# Patient Record
Sex: MALE | Race: WHITE | Employment: OTHER | URBAN - METROPOLITAN AREA
[De-identification: names, ages, dates, MRNs, and addresses within clinical notes are randomized per-mention and may not be internally consistent; named-entity substitution may affect disease eponyms.]

---

## 2018-03-29 ENCOUNTER — OFFICE VISIT (OUTPATIENT)
Dept: CARDIOLOGY CLINIC | Age: 63
End: 2018-03-29
Payer: COMMERCIAL

## 2018-03-29 VITALS
DIASTOLIC BLOOD PRESSURE: 62 MMHG | RESPIRATION RATE: 14 BRPM | WEIGHT: 218 LBS | HEART RATE: 52 BPM | BODY MASS INDEX: 28.89 KG/M2 | HEIGHT: 73 IN | SYSTOLIC BLOOD PRESSURE: 132 MMHG

## 2018-03-29 DIAGNOSIS — I25.10 ATHEROSCLEROSIS OF NATIVE CORONARY ARTERY OF NATIVE HEART WITHOUT ANGINA PECTORIS: Primary | ICD-10-CM

## 2018-03-29 PROCEDURE — 99214 OFFICE O/P EST MOD 30 MIN: CPT | Performed by: INTERNAL MEDICINE

## 2018-03-29 PROCEDURE — 93000 ELECTROCARDIOGRAM COMPLETE: CPT | Performed by: INTERNAL MEDICINE

## 2018-03-29 RX ORDER — MESALAMINE 1000 MG/1
SUPPOSITORY RECTAL
Refills: 0 | COMMUNITY
Start: 2018-03-18 | End: 2021-10-05 | Stop reason: CLARIF

## 2018-03-29 NOTE — PROGRESS NOTES
Chief Complaint   Patient presents with    Coronary Artery Disease       Patient Active Problem List    Diagnosis Date Noted    Coronary atherosclerosis of native coronary artery 03/07/2012     Overview Note:       A. Cardiac catheterization, 4/15/05. Moderate coronary atherosclerosis in RCA, circumflex and LAD arteries. Normal LV systolic function. LVEF 65%. B. Exercise Myoview 4/21/08.  13 METS, no chest pain, normal ECG, normal scan.  Hypertension     Hyperlipidemia      Overview Note:       C. Possible LFT abnormality with Lipitor. Myalgias with crestor      Diabetes mellitus ([facility]        Current Outpatient Prescriptions   Medication Sig Dispense Refill    metoprolol succinate (TOPROL XL) 50 MG extended release tablet Take 50 mg by mouth daily       CIALIS 20 MG tablet Take 20 mg by mouth as needed       insulin aspart protamine-insulin aspart (NOVOLOG MIX 70/30 FLEXPEN) (70-30) 100 UNIT/ML injection Inject  into the skin as needed. Sliding scale as needed      potassium chloride (KLOR-CON) 20 MEQ packet Take 20 mEq by mouth 2 times daily. Takes 2 tabs am, 1 tab pm      MERCAPTOPURINE PO Take 125 mg by mouth. 2.5 tabs a day      amlodipine-benazepril (LOTREL) 10-20 MG per capsule Take 1 capsule by mouth daily.  terazosin (HYTRIN) 2 MG capsule Take 3 mg by mouth 2 times daily.  hydrochlorothiazide (HYDRODIURIL) 25 MG tablet Take 25 mg by mouth daily.  pravastatin (PRAVACHOL) 20 MG tablet Take 20 mg by mouth daily.  aspirin 81 MG EC tablet Take 81 mg by mouth daily.  balsalazide (COLAZAL) 750 MG capsule Take 750 mg by mouth 3 times daily.  Testosterone (ANDROGEL TD) Place  onto the skin daily. pump      Cholecalciferol (VITAMIN D PO) Take  by mouth.  insulin glargine (LANTUS) 100 UNIT/ML injection Inject 28 Units into the skin every morning.       CANASA 1000 MG suppository unwrap and insert 1 suppository rectally at bedtime  0     No current

## 2018-03-29 NOTE — PROGRESS NOTES
Subjective:      Patient ID: Nino Blanc is a 57 y.o. male.     HPI    Review of Systems    Objective:   Physical Exam    Assessment:        Plan:

## 2019-04-08 ENCOUNTER — OFFICE VISIT (OUTPATIENT)
Dept: CARDIOLOGY CLINIC | Age: 64
End: 2019-04-08
Payer: COMMERCIAL

## 2019-04-08 VITALS
RESPIRATION RATE: 16 BRPM | HEIGHT: 73 IN | WEIGHT: 211 LBS | HEART RATE: 67 BPM | BODY MASS INDEX: 27.96 KG/M2 | SYSTOLIC BLOOD PRESSURE: 124 MMHG | DIASTOLIC BLOOD PRESSURE: 70 MMHG

## 2019-04-08 DIAGNOSIS — I25.10 ATHEROSCLEROSIS OF NATIVE CORONARY ARTERY WITHOUT ANGINA PECTORIS, UNSPECIFIED WHETHER NATIVE OR TRANSPLANTED HEART: Primary | ICD-10-CM

## 2019-04-08 PROCEDURE — 93000 ELECTROCARDIOGRAM COMPLETE: CPT | Performed by: INTERNAL MEDICINE

## 2019-04-08 PROCEDURE — 99213 OFFICE O/P EST LOW 20 MIN: CPT | Performed by: INTERNAL MEDICINE

## 2019-04-08 NOTE — PROGRESS NOTES
Chief Complaint   Patient presents with    Coronary Artery Disease       Patient Active Problem List    Diagnosis Date Noted    Coronary atherosclerosis of native coronary artery 03/07/2012     Overview Note:       A. Cardiac catheterization, 4/15/05. Moderate coronary atherosclerosis in RCA, circumflex and LAD arteries. Normal LV systolic function. LVEF 65%. B. Exercise Myoview 4/21/08.  13 METS, no chest pain, normal ECG, normal scan.  Hypertension     Hyperlipidemia      Overview Note:       C. Possible LFT abnormality with Lipitor. Myalgias with crestor      Diabetes mellitus ([facility] [address]        Current Outpatient Medications   Medication Sig Dispense Refill    CANASA 1000 MG suppository unwrap and insert 1 suppository rectally at bedtime  0    metoprolol succinate (TOPROL XL) 50 MG extended release tablet Take 50 mg by mouth daily       CIALIS 20 MG tablet Take 20 mg by mouth as needed       insulin aspart protamine-insulin aspart (NOVOLOG MIX 70/30 FLEXPEN) (70-30) 100 UNIT/ML injection Inject  into the skin as needed. Sliding scale as needed      potassium chloride (KLOR-CON) 20 MEQ packet Take 20 mEq by mouth 2 times daily. Takes 2 tabs am, 1 tab pm      amlodipine-benazepril (LOTREL) 10-20 MG per capsule Take 1 capsule by mouth daily.  terazosin (HYTRIN) 2 MG capsule Take 3 mg by mouth 2 times daily Indications: Takes 7 mg in the am 5 mg the pm       hydrochlorothiazide (HYDRODIURIL) 25 MG tablet Take 25 mg by mouth daily.  pravastatin (PRAVACHOL) 20 MG tablet Take 20 mg by mouth daily.  aspirin 81 MG EC tablet Take 81 mg by mouth daily.  balsalazide (COLAZAL) 750 MG capsule Take 750 mg by mouth 3 times daily.  Testosterone (ANDROGEL TD) Place  onto the skin daily. pump      Cholecalciferol (VITAMIN D PO) Take  by mouth.         insulin glargine (LANTUS) 100 UNIT/ML injection Inject 32 Units into the skin every morning       MERCAPTOPURINE PO Take 125 mg

## 2020-07-08 ENCOUNTER — HOSPITAL ENCOUNTER (EMERGENCY)
Age: 65
Discharge: HOME | End: 2020-07-08
Attending: EMERGENCY MEDICINE
Payer: MEDICARE

## 2020-07-08 ENCOUNTER — APPOINTMENT (OUTPATIENT)
Dept: GENERAL RADIOLOGY | Age: 65
End: 2020-07-08
Payer: MEDICARE

## 2020-07-08 VITALS
DIASTOLIC BLOOD PRESSURE: 80 MMHG | RESPIRATION RATE: 16 BRPM | BODY MASS INDEX: 25.18 KG/M2 | WEIGHT: 190 LBS | HEIGHT: 73 IN | TEMPERATURE: 97.7 F | SYSTOLIC BLOOD PRESSURE: 147 MMHG | OXYGEN SATURATION: 99 % | HEART RATE: 82 BPM

## 2020-07-08 LAB
ANION GAP SERPL CALCULATED.3IONS-SCNC: 10 MMOL/L (ref 7–16)
ANISOCYTOSIS: ABNORMAL
APTT: 26.2 SEC (ref 24.5–35.1)
BASOPHILS ABSOLUTE: 0.01 E9/L (ref 0–0.2)
BASOPHILS RELATIVE PERCENT: 0.1 % (ref 0–2)
BUN BLDV-MCNC: 17 MG/DL (ref 8–23)
CALCIUM SERPL-MCNC: 9.3 MG/DL (ref 8.6–10.2)
CHLORIDE BLD-SCNC: 99 MMOL/L (ref 98–107)
CO2: 30 MMOL/L (ref 22–29)
CREAT SERPL-MCNC: 0.9 MG/DL (ref 0.7–1.2)
EKG ATRIAL RATE: 441 BPM
EKG Q-T INTERVAL: 380 MS
EKG QRS DURATION: 104 MS
EKG QTC CALCULATION (BAZETT): 430 MS
EKG R AXIS: 46 DEGREES
EKG T AXIS: 43 DEGREES
EKG VENTRICULAR RATE: 77 BPM
EOSINOPHILS ABSOLUTE: 0.01 E9/L (ref 0.05–0.5)
EOSINOPHILS RELATIVE PERCENT: 0.1 % (ref 0–6)
GFR AFRICAN AMERICAN: >60
GFR NON-AFRICAN AMERICAN: >60 ML/MIN/1.73
GLUCOSE BLD-MCNC: 159 MG/DL (ref 74–99)
HCT VFR BLD CALC: 36.6 % (ref 37–54)
HEMOGLOBIN: 11.9 G/DL (ref 12.5–16.5)
IMMATURE GRANULOCYTES #: 0.06 E9/L
IMMATURE GRANULOCYTES %: 0.9 % (ref 0–5)
INR BLD: 1
LYMPHOCYTES ABSOLUTE: 0.4 E9/L (ref 1.5–4)
LYMPHOCYTES RELATIVE PERCENT: 5.9 % (ref 20–42)
MCH RBC QN AUTO: 29.1 PG (ref 26–35)
MCHC RBC AUTO-ENTMCNC: 32.5 % (ref 32–34.5)
MCV RBC AUTO: 89.5 FL (ref 80–99.9)
MONOCYTES ABSOLUTE: 0.23 E9/L (ref 0.1–0.95)
MONOCYTES RELATIVE PERCENT: 3.4 % (ref 2–12)
NEUTROPHILS ABSOLUTE: 6.08 E9/L (ref 1.8–7.3)
NEUTROPHILS RELATIVE PERCENT: 89.6 % (ref 43–80)
OVALOCYTES: ABNORMAL
PDW BLD-RTO: 13.5 FL (ref 11.5–15)
PLATELET # BLD: 107 E9/L (ref 130–450)
PMV BLD AUTO: 8.9 FL (ref 7–12)
POIKILOCYTES: ABNORMAL
POTASSIUM REFLEX MAGNESIUM: 3.8 MMOL/L (ref 3.5–5)
PRO-BNP: 926 PG/ML (ref 0–125)
PROTHROMBIN TIME: 12 SEC (ref 9.3–12.4)
RBC # BLD: 4.09 E12/L (ref 3.8–5.8)
SCHISTOCYTES: ABNORMAL
SODIUM BLD-SCNC: 139 MMOL/L (ref 132–146)
TROPONIN: <0.01 NG/ML (ref 0–0.03)
WBC # BLD: 6.8 E9/L (ref 4.5–11.5)

## 2020-07-08 PROCEDURE — 84484 ASSAY OF TROPONIN QUANT: CPT

## 2020-07-08 PROCEDURE — 80048 BASIC METABOLIC PNL TOTAL CA: CPT

## 2020-07-08 PROCEDURE — 71046 X-RAY EXAM CHEST 2 VIEWS: CPT

## 2020-07-08 PROCEDURE — 85610 PROTHROMBIN TIME: CPT

## 2020-07-08 PROCEDURE — 85730 THROMBOPLASTIN TIME PARTIAL: CPT

## 2020-07-08 PROCEDURE — 93010 ELECTROCARDIOGRAM REPORT: CPT | Performed by: INTERNAL MEDICINE

## 2020-07-08 PROCEDURE — 99284 EMERGENCY DEPT VISIT MOD MDM: CPT

## 2020-07-08 PROCEDURE — 36415 COLL VENOUS BLD VENIPUNCTURE: CPT

## 2020-07-08 PROCEDURE — 85025 COMPLETE CBC W/AUTO DIFF WBC: CPT

## 2020-07-08 PROCEDURE — 83880 ASSAY OF NATRIURETIC PEPTIDE: CPT

## 2020-07-08 PROCEDURE — 93005 ELECTROCARDIOGRAM TRACING: CPT | Performed by: EMERGENCY MEDICINE

## 2020-07-08 ASSESSMENT — ENCOUNTER SYMPTOMS
ABDOMINAL PAIN: 0
SORE THROAT: 0
RHINORRHEA: 0
SHORTNESS OF BREATH: 0
COUGH: 0
BACK PAIN: 0
VOMITING: 0
DIARRHEA: 1
WHEEZING: 0
EYE DISCHARGE: 0
EYE PAIN: 0
NAUSEA: 0
CHEST TIGHTNESS: 0
EYE REDNESS: 0

## 2020-07-08 NOTE — ED PROVIDER NOTES
Musculoskeletal: Normal range of motion and neck supple. Cardiovascular:      Rate and Rhythm: Normal rate. Rhythm irregular. Heart sounds: Normal heart sounds. No murmur. Pulmonary:      Effort: Pulmonary effort is normal. No respiratory distress. Breath sounds: Normal breath sounds. No wheezing or rales. Abdominal:      General: Bowel sounds are normal.      Palpations: Abdomen is soft. Tenderness: There is no abdominal tenderness. There is no guarding or rebound. Musculoskeletal:         General: No swelling, tenderness or deformity. Skin:     General: Skin is warm and dry. Neurological:      Mental Status: He is alert and oriented to person, place, and time. Cranial Nerves: No cranial nerve deficit. Coordination: Coordination normal.        EKG Interpretation    Interpreted by emergency department physician    Rhythm: atrial fibrillation - controlled  Rate: normal, 77  Axis: normal  Ectopy: none  ST Segments: no acute change  T Waves: no acute change  Q Waves: none    Clinical Impression: atrial fibrillation (new onset)    [place]    Procedures     MDM  Number of Diagnoses or Management Options  Diagnosis management comments: 22-year-old male presents to the ED from his PCP office due to incidental finding of new onset atrial fibrillation, asymptomatic. Patient is currently on metoprolol for his blood pressure, rate controlled. He is not on any OAC's. No significant cardiac history, stress test in 2008 was normal.  Currently being treated for ulcerative colitis, is on prednisone and Humira; reports recent weight loss of 30 pounds secondary to this diagnosis. Initial EKG shows atrial fibrillation with normal rate. Differential diagnoses include but are not limited to new onset arrhythmia 2/2 recent medication additions, blood loss anemia, ACS, CHF, hypertension.   Orders include troponin, BNP, BMP, CBC, CXR.       1627 spoke with Dr. Reema Tipton covering for Dr. Jarrell Mcpherson cardiology group, recommends to avoid OAC's at this time due to patient's recent GI bleed within the last week, acknowledges that patient is currently rate controlled with metoprolol, requests patient follow-up with Dr. Pedro Luis Nieto within 1 week. Okay to discharge home from cardiology standpoint. 1700 spoke with Dr. Saritha Conteh who agreed with cardiology that OACs will be held for now in light of patient's recent UC GI bleed history. Requests that patient call on Friday to make an appointment to see him next Tuesday for follow-up. Okay to discharge home from PCP standpoint. ED Course as of Jul 08 1824 Wed Jul 08, 2020   1721 Pro-BNP(!): 926 [VG]   1722 Pro-BNP(!): 926 [VG]   1723 Troponin: <0.01 [VG]      ED Course User Index  [VG] Bonita Ramos DO        ED Course as of Jul 08 1824 Wed Jul 08, 2020   1721 Pro-BNP(!): 926 [VG]   1722 Pro-BNP(!): 926 [VG]   1723 Troponin: <0.01 [VG]      ED Course User Index  [VG] Bonita Ramos DO       --------------------------------------------- PAST HISTORY ---------------------------------------------  Past Medical History:  has a past medical history of Coronary atherosclerosis of native coronary artery, Diabetes mellitus ([facility] [address] Hyperlipidemia, Hypertension, and [facility] Past Surgical History:  has a past surgical history that includes Diagnostic Cardiac Cath Lab Procedure and sigmoidoscopy. Social History:  reports that he quit smoking about 38 years ago. He has never used smokeless tobacco. He reports that he does not drink alcohol or use drugs. Family History: family history includes Cancer in his mother. The patients home medications have been reviewed.     Allergies: Sulfa antibiotics    -------------------------------------------------- RESULTS -------------------------------------------------  Labs:  Results for orders placed or performed during the hospital encounter of 07/08/20   CBC Auto Differential   Result Value Ref ------------------------- NURSING NOTES AND VITALS REVIEWED ---------------------------  Date / Time Roomed:  7/8/2020  3:24 PM  ED Bed Assignment:  19/19    The nursing notes within the ED encounter and vital signs as below have been reviewed. BP (!) 154/93   Pulse 86   Temp 97.7 °F (36.5 °C) (Temporal)   Resp 16   Ht 6' 1\" (1.854 m)   Wt 190 lb (86.2 kg)   SpO2 99%   BMI 25.07 kg/m²   Oxygen Saturation Interpretation: Normal      ------------------------------------------ PROGRESS NOTES ------------------------------------------  6:01 PM EDT  I have spoken with the patient and discussed todays results, in addition to providing specific details for the plan of care and counseling regarding the diagnosis and prognosis. Their questions are answered at this time and they are agreeable with the plan. I discussed at length with them reasons for immediate return here for re evaluation. They will followup with their cardiologist and primary care physician by calling their offices tomorrow and Friday for follow-up appointments within the next week.      --------------------------------- ADDITIONAL PROVIDER NOTES ---------------------------------  At this time the patient is without objective evidence of an acute process requiring hospitalization or inpatient management. They have remained hemodynamically stable throughout their entire ED visit and are stable for discharge with outpatient follow-up. The plan has been discussed in detail and they are aware of the specific conditions for emergent return, as well as the importance of follow-up. New Prescriptions    No medications on file       Diagnosis:  1. Atrial fibrillation, unspecified type ([facility] New Problem        Disposition:  Patient's disposition: Discharge to home  Patient's condition is stable.          Genoveva Hill DO  Resident  07/08/20 1310 [facility] Herlinda Fisher DO  Resident  07/08/20 8388

## 2020-07-09 ENCOUNTER — TELEPHONE (OUTPATIENT)
Dept: ADMINISTRATIVE | Age: 65
End: 2020-07-09

## 2020-07-09 NOTE — TELEPHONE ENCOUNTER
Patient called stated he was in the ER yesterday 7/8/20 for AFib and was told to f/u with Dr. Halley Marinelli next week, he can be reached at [phone].

## 2020-07-13 ENCOUNTER — TELEPHONE (OUTPATIENT)
Dept: CARDIOLOGY CLINIC | Age: 65
End: 2020-07-13

## 2020-07-16 ENCOUNTER — OFFICE VISIT (OUTPATIENT)
Dept: CARDIOLOGY CLINIC | Age: 65
End: 2020-07-16
Payer: MEDICARE

## 2020-07-16 VITALS
RESPIRATION RATE: 16 BRPM | HEIGHT: 73 IN | DIASTOLIC BLOOD PRESSURE: 74 MMHG | WEIGHT: 190 LBS | BODY MASS INDEX: 25.18 KG/M2 | OXYGEN SATURATION: 99 % | SYSTOLIC BLOOD PRESSURE: 126 MMHG | TEMPERATURE: 97.5 F | HEART RATE: 87 BPM

## 2020-07-16 PROBLEM — I48.19 OTHER PERSISTENT ATRIAL FIBRILLATION (HCC): Status: ACTIVE | Noted: 2020-07-16

## 2020-07-16 PROCEDURE — 93000 ELECTROCARDIOGRAM COMPLETE: CPT | Performed by: INTERNAL MEDICINE

## 2020-07-16 PROCEDURE — 99215 OFFICE O/P EST HI 40 MIN: CPT | Performed by: INTERNAL MEDICINE

## 2020-07-16 RX ORDER — PREDNISONE 10 MG/1
35 TABLET ORAL SEE ADMIN INSTRUCTIONS
Status: ON HOLD | COMMUNITY
End: 2021-02-15 | Stop reason: HOSPADM

## 2020-07-16 RX ORDER — ADALIMUMAB 40MG/0.4ML
40 KIT SUBCUTANEOUS
Status: ON HOLD | COMMUNITY
End: 2021-02-07

## 2020-07-16 RX ORDER — DICYCLOMINE HYDROCHLORIDE 10 MG/1
10 CAPSULE ORAL
COMMUNITY
End: 2022-05-19 | Stop reason: ALTCHOICE

## 2020-07-16 NOTE — PROGRESS NOTES
Chief Complaint   Patient presents with    Coronary Artery Disease    Hyperlipidemia    Hypertension       Patient Active Problem List    Diagnosis Date Noted    Other persistent atrial fibrillation 07/16/2020     Overview Note:     A. CHADS-VASC=4      Coronary atherosclerosis of native coronary artery 03/07/2012     Overview Note:       A. Cardiac catheterization, 4/15/05. Moderate coronary atherosclerosis in RCA, circumflex and LAD arteries. Normal LV systolic function. LVEF 65%. B. Exercise Myoview 4/21/08.  13 METS, no chest pain, normal ECG, normal scan.  Hypertension     Hyperlipidemia      Overview Note:       C. Possible LFT abnormality with Lipitor. Myalgias with crestor      Diabetes mellitus ([facility] [address]        Current Outpatient Medications   Medication Sig Dispense Refill    ZINC OXIDE, TOPICAL, 10 % CREA Apply topically 4 times daily as needed      Adalimumab (HUMIRA) 40 MG/0.4ML PSKT Inject 40 mg into the skin Every two weeks      predniSONE (DELTASONE) 5 MG tablet Take 5 mg by mouth daily      dicyclomine (BENTYL) 10 MG capsule Take 10 mg by mouth 4 times daily (before meals and nightly)      CANASA 1000 MG suppository unwrap and insert 1 suppository rectally at bedtime  0    metoprolol succinate (TOPROL XL) 50 MG extended release tablet Take 50 mg by mouth daily       CIALIS 20 MG tablet Take 20 mg by mouth as needed       insulin aspart protamine-insulin aspart (NOVOLOG MIX 70/30 FLEXPEN) (70-30) 100 UNIT/ML injection Inject  into the skin as needed. Sliding scale as needed      potassium chloride (KLOR-CON) 20 MEQ packet Take 20 mEq by mouth 2 times daily. Takes 2 tabs am, 1 tab pm      amlodipine-benazepril (LOTREL) 10-20 MG per capsule Take 1 capsule by mouth daily.  terazosin (HYTRIN) 2 MG capsule Take 3 mg by mouth 2 times daily Indications: Takes 7 mg in the am 5 mg the pm       hydrochlorothiazide (HYDRODIURIL) 25 MG tablet Take 25 mg by mouth daily.         pravastatin (PRAVACHOL) 20 MG tablet Take 20 mg by mouth daily.  aspirin 81 MG EC tablet Take 81 mg by mouth daily.  balsalazide (COLAZAL) 750 MG capsule Take 750 mg by mouth 3 times daily.  Testosterone (ANDROGEL TD) Place  onto the skin daily. pump      Cholecalciferol (VITAMIN D PO) Take  by mouth.  insulin glargine (LANTUS) 100 UNIT/ML injection Inject 32 Units into the skin every morning       MERCAPTOPURINE PO Take 125 mg by mouth.  2.5 tabs a day       Current Facility-Administered Medications   Medication Dose Route Frequency Provider Last Rate Last Dose    perflutren lipid microspheres (DEFINITY) injection 1.65 mg  1.5 mL Intravenous ONCE PRN Kelly Rg MD            Allergies   Allergen Reactions    Sulfa Antibiotics        Vitals:    20 0915   BP: 126/74   Pulse: 87   Resp: 16   Temp: 97.5 °F (36.4 °C)   SpO2: 99%   Weight: 190 lb (86.2 kg)   Height: 6' 1\" (1.854 m)       Social History     Socioeconomic History    Marital status:      Spouse name: Not on file    Number of children: Not on file    Years of education: Not on file    Highest education level: Not on file   Occupational History    Not on file   Social Needs    Financial resource strain: Not on file    Food insecurity     Worry: Not on file     Inability: Not on file    Transportation needs     Medical: Not on file     Non-medical: Not on file   Tobacco Use    Smoking status: Former Smoker     Last attempt to quit: 1982     Years since quittin.5    Smokeless tobacco: Never Used   Substance and Sexual Activity    Alcohol use: No    Drug use: No    Sexual activity: Not on file   Lifestyle    Physical activity     Days per week: Not on file     Minutes per session: Not on file    Stress: Not on file   Relationships    Social connections     Talks on phone: Not on file     Gets together: Not on file     Attends Scientologist service: Not on file     Active member of club or organization: Not on file     Attends meetings of clubs or organizations: Not on file     Relationship status: Not on file    Intimate partner violence     Fear of current or ex partner: Not on file     Emotionally abused: Not on file     Physically abused: Not on file     Forced sexual activity: Not on file   Other Topics Concern    Not on file   Social History Narrative    Not on file       Family History   Problem Relation Age of Onset    Cancer Mother          SUBJECTIVE: Albino Rousseau presents to the office today for re-evaluation of cardiac diagnoses and evaluation post ED visit of 7/8. Markie Fang He complains of no cardiac issues but was found to be in afib by PCP last week and send to ER.  ekg there identified atrial fibrillation. Sent home as no chf - [address] not started due to recent GIBs from [facility]. Markie Fang He  denies   chest pain, chest pressure/discomfort, claudication, dyspnea, exertional chest pressure/discomfort, fatigue, irregular heart beat, lower extremity edema, near-syncope, orthopnea, palpitations, paroxysmal nocturnal dyspnea, syncope and tachypnea. Sees [facility] for UC and dr George Sabillon for renal issues/htn        Review of Systems:   Heart: as above   Lungs: as above   Eyes: denies changes in vision or discharge. Ears: denies changes in hearing or pain. Nose: denies epistaxis or masses   Throat: denies sore throat or trouble swallowing. Neuro: denies numbness, tingling, tremors. Skin: denies rashes or itching. : denies hematuria, dysuria   GI: denies vomiting, diarrhea   Psych: denies mood changed, anxiety, depression. all others negative. Physical Exam   /74   Pulse 87   Temp 97.5 °F (36.4 °C)   Resp 16   Ht 6' 1\" (1.854 m)   Wt 190 lb (86.2 kg)   SpO2 99%   BMI 25.07 kg/m²   Constitutional: Oriented to person, place, and time. Well-developed and well-nourished. No distress. Head: Normocephalic and atraumatic. Eyes: EOM are normal. Pupils are equal, round, and reactive to light.

## 2020-08-14 ENCOUNTER — TELEPHONE (OUTPATIENT)
Dept: CARDIOLOGY | Age: 65
End: 2020-08-14

## 2020-08-17 ENCOUNTER — HOSPITAL ENCOUNTER (OUTPATIENT)
Dept: CARDIOLOGY | Age: 65
Discharge: HOME | End: 2020-08-17
Payer: MEDICARE

## 2020-08-17 LAB
LV EF: 55 %
LVEF MODALITY: NORMAL

## 2020-08-17 PROCEDURE — 93306 TTE W/DOPPLER COMPLETE: CPT

## 2020-08-18 ENCOUNTER — TELEPHONE (OUTPATIENT)
Dept: CARDIOLOGY CLINIC | Age: 65
End: 2020-08-18

## 2020-08-18 NOTE — TELEPHONE ENCOUNTER
----- Message from Wendie Mcnally MD sent at 8/18/2020  6:35 AM EDT -----  Echo no issues  Ov two months

## 2020-08-18 NOTE — TELEPHONE ENCOUNTER
Patient notified  and instructed on echo results and follow-up per Dr. Pedro Luis Nieto. He stated understanding.

## 2020-10-01 ENCOUNTER — OFFICE VISIT (OUTPATIENT)
Dept: CARDIOLOGY CLINIC | Age: 65
End: 2020-10-01
Payer: MEDICARE

## 2020-10-01 VITALS
RESPIRATION RATE: 18 BRPM | HEART RATE: 92 BPM | WEIGHT: 187.6 LBS | BODY MASS INDEX: 24.86 KG/M2 | DIASTOLIC BLOOD PRESSURE: 70 MMHG | HEIGHT: 73 IN | SYSTOLIC BLOOD PRESSURE: 118 MMHG

## 2020-10-01 PROBLEM — Z92.89 HISTORY OF ECHOCARDIOGRAM: Status: ACTIVE | Noted: 2020-10-01

## 2020-10-01 PROCEDURE — 99213 OFFICE O/P EST LOW 20 MIN: CPT | Performed by: INTERNAL MEDICINE

## 2020-10-01 PROCEDURE — 93000 ELECTROCARDIOGRAM COMPLETE: CPT | Performed by: INTERNAL MEDICINE

## 2020-10-01 RX ORDER — VIT C/B6/B5/MAGNESIUM/HERB 173 50-5-6-5MG
500 CAPSULE ORAL 2 TIMES DAILY
COMMUNITY

## 2020-10-01 RX ORDER — FERROUS SULFATE 325(65) MG
325 TABLET ORAL
COMMUNITY
End: 2021-02-11

## 2020-10-01 NOTE — PROGRESS NOTES
Chief Complaint   Patient presents with    Atrial Fibrillation    Hypertension       Patient Active Problem List    Diagnosis Date Noted    History of echocardiogram 10/01/2020     Overview Note:     A. Echo 8/17/2020: EF 55%, mild LVH, mild MR      Other persistent atrial fibrillation ([facility] [address] 07/16/2020     Overview Note:     A. CHADS-VASC=4      Coronary atherosclerosis of native coronary artery 03/07/2012     Overview Note:       A. Cardiac catheterization, 4/15/05. Moderate coronary atherosclerosis in RCA, circumflex and LAD arteries. Normal LV systolic function. LVEF 65%. B. Exercise Myoview 4/21/08.  13 METS, no chest pain, normal ECG, normal scan.  Hypertension     Hyperlipidemia      Overview Note:       C. Possible LFT abnormality with Lipitor. Myalgias with crestor      Diabetes mellitus ([facility] [address]        Current Outpatient Medications   Medication Sig Dispense Refill    ferrous sulfate (IRON 325) 325 (65 Fe) MG tablet Take 325 mg by mouth daily (with breakfast)      Turmeric 500 MG CAPS Take 500 mg by mouth daily      Adalimumab (HUMIRA) 40 MG/0.4ML PSKT Inject 40 mg into the skin Every two weeks      predniSONE (DELTASONE) 10 MG tablet Take 10 mg by mouth daily       dicyclomine (BENTYL) 10 MG capsule Take 10 mg by mouth 4 times daily (before meals and nightly)      CANASA 1000 MG suppository unwrap and insert 1 suppository rectally at bedtime  0    metoprolol succinate (TOPROL XL) 50 MG extended release tablet Take 50 mg by mouth daily       CIALIS 20 MG tablet Take 20 mg by mouth as needed       insulin aspart protamine-insulin aspart (NOVOLOG MIX 70/30 FLEXPEN) (70-30) 100 UNIT/ML injection Inject  into the skin as needed. Sliding scale as needed      potassium chloride (KLOR-CON) 20 MEQ packet Take 20 mEq by mouth 2 times daily. Takes 2 tabs am, 1 tab pm      MERCAPTOPURINE PO Take 125 mg by mouth.  2.5 tabs a day      amlodipine-benazepril (LOTREL) 10-20 MG per capsule Take 1 capsule by mouth daily.  terazosin (HYTRIN) 2 MG capsule Take 3 mg by mouth 2 times daily Indications: Takes 7 mg in the am 5 mg the pm       hydrochlorothiazide (HYDRODIURIL) 25 MG tablet Take 25 mg by mouth daily.  pravastatin (PRAVACHOL) 20 MG tablet Take 20 mg by mouth daily.  aspirin 81 MG EC tablet Take 81 mg by mouth daily.  balsalazide (COLAZAL) 750 MG capsule Take 750 mg by mouth 3 times daily.  Testosterone (ANDROGEL TD) Place  onto the skin daily.  pump      Cholecalciferol (VITAMIN D PO) Take 2,000 Units by mouth 2 times daily       insulin glargine (LANTUS) 100 UNIT/ML injection Inject 28 Units into the skin every morning       ZINC OXIDE, TOPICAL, 10 % CREA Apply topically 4 times daily as needed       Current Facility-Administered Medications   Medication Dose Route Frequency Provider Last Rate Last Dose    perflutren lipid microspheres (DEFINITY) injection 1.65 mg  1.5 mL Intravenous ONCE PRN Anh Nesbitt MD            Allergies   Allergen Reactions    Sulfa Antibiotics        Vitals:    10/01/20 1029   BP: 118/70   Pulse: 92   Resp: 18   Weight: 187 lb 9.6 oz (85.1 kg)   Height: 6' 1\" (1.854 m)       Social History     Socioeconomic History    Marital status:      Spouse name: Not on file    Number of children: Not on file    Years of education: Not on file    Highest education level: Not on file   Occupational History    Not on file   Social Needs    Financial resource strain: Not on file    Food insecurity     Worry: Not on file     Inability: Not on file    Transportation needs     Medical: Not on file     Non-medical: Not on file   Tobacco Use    Smoking status: Former Smoker     Last attempt to quit: 1982     Years since quittin.7    Smokeless tobacco: Never Used   Substance and Sexual Activity    Alcohol use: No    Drug use: No    Sexual activity: Not on file   Lifestyle    Physical activity     Days per week: Not on file

## 2021-02-05 ENCOUNTER — HOSPITAL ENCOUNTER (EMERGENCY)
Age: 66
Discharge: HOME | End: 2021-02-05
Payer: MEDICARE

## 2021-02-05 ENCOUNTER — APPOINTMENT (OUTPATIENT)
Dept: GENERAL RADIOLOGY | Age: 66
End: 2021-02-05
Payer: MEDICARE

## 2021-02-05 VITALS
RESPIRATION RATE: 14 BRPM | WEIGHT: 180 LBS | HEART RATE: 85 BPM | BODY MASS INDEX: 23.86 KG/M2 | HEIGHT: 73 IN | DIASTOLIC BLOOD PRESSURE: 73 MMHG | TEMPERATURE: 98.4 F | SYSTOLIC BLOOD PRESSURE: 123 MMHG | OXYGEN SATURATION: 100 %

## 2021-02-05 DIAGNOSIS — R05.9 COUGH: Primary | ICD-10-CM

## 2021-02-05 PROCEDURE — 99283 EMERGENCY DEPT VISIT LOW MDM: CPT

## 2021-02-05 PROCEDURE — 71046 X-RAY EXAM CHEST 2 VIEWS: CPT

## 2021-02-05 PROCEDURE — U0003 INFECTIOUS AGENT DETECTION BY NUCLEIC ACID (DNA OR RNA); SEVERE ACUTE RESPIRATORY SYNDROME CORONAVIRUS 2 (SARS-COV-2) (CORONAVIRUS DISEASE [COVID-19]), AMPLIFIED PROBE TECHNIQUE, MAKING USE OF HIGH THROUGHPUT TECHNOLOGIES AS DESCRIBED BY CMS-2020-01-R: HCPCS

## 2021-02-05 NOTE — ED PROVIDER NOTES
[facility]  Department of Emergency Medicine   ED  Encounter Note  Admit Date/RoomTime: 2021 11:54 AM  ED Room: 30-A/30-A    NAME: Baljit Haider  : 1955  MRN: [id number]     Chief Complaint:  Other (chest congestion x 2-3 days mostly at night, denies cough)    HISTORY OF PRESENT ILLNESS        Baljit Haider is a 77 y.o. male who presents to the ED by private vehicle for productive cough x3 days. Patient states it is worse at night. Patient states he is also had a low-grade fever of 99.5. Patient states he called his PCP who sent him here to rule out Covid. Patient states his symptoms are mild in severity. Patient denies any pain. Patient denies anything making it better or worse. Patient denies history of asthma or COPD. Patient denies known Covid exposure. Denies headache, vision change, dizziness, hemoptysis, chest pain, dyspnea, abdominal pain, NVD, numbness/weakness. ROS   Pertinent positives and negatives are stated within HPI, all other systems reviewed and are negative. Past Medical History:  has a past medical history of Coronary atherosclerosis of native coronary artery, Diabetes mellitus ([facility] [address] Hyperlipidemia, Hypertension, and Ulcerative colitis ([facility] [address] Surgical History:  has a past surgical history that includes Diagnostic Cardiac Cath Lab Procedure and sigmoidoscopy. Social History:  reports that he quit smoking about 39 years ago. He has never used smokeless tobacco. He reports that he does not drink alcohol or use drugs. Family History: family history includes Cancer in his mother.      Allergies: Sulfa antibiotics    PHYSICAL EXAM   Oxygen Saturation Interpretation: Normal.        ED Triage Vitals [21 1152]   BP Temp Temp src Pulse Resp SpO2 Height Weight   123/73 98.4 °F (36.9 °C) -- 85 14 100 % 6' 1\" (1.854 m) 180 lb (81.6 kg)         Physical Exam  Constitutional/General: Alert and oriented x3, well appearing, non toxic.  HEENT:  NC/NT. PERRLA,  Airway patent. Neck: Supple, full ROM, non tender to palpation in the midline, no stridor, no crepitus, no meningeal signs  Respiratory: Lungs clear to auscultation bilaterally, no wheezes, rales, or rhonchi. Not in respiratory distress  CV:  Regular rate. Regular rhythm. No murmurs, gallops, or rubs. 2+ distal pulses  Chest: No chest wall tenderness  GI:  Abdomen Soft, Non tender, Non distended. +BS. No rebound, guarding, or rigidity. No pulsatile masses. Musculoskeletal: Moves all extremities x 4. Warm and well perfused, no clubbing, cyanosis, or edema. Capillary refill <3 seconds  Integument: skin warm and dry. No rashes. Lymphatic: no lymphadenopathy noted  Neurologic: GCS 15, no focal deficits, symmetric strength 5/5 in the upper and lower extremities bilaterally  Psychiatric: Normal Affect      Lab / Imaging Results   (All laboratory and radiology results have been personally reviewed by myself)  Labs:  No results found for this visit on 02/05/21. Imaging: All Radiology results interpreted by Radiologist unless otherwise noted. XR CHEST (2 VW)   Final Result   No acute cardiopulmonary process. ED Course / Medical Decision Making   Medications - No data to display       Consultations:             None    Procedures:   none    MDM: Patient presenting with productive cough. Patient is in no acute distress, afebrile, nontoxic in appearance. Patient's x-ray is negative. Patient will receive a send out Covid. Patient's vitals are all stable. Recommended patient follow-up with PCP. Recommended patient return to the ED with new or worsening of symptoms. Plan of Care/Counseling:  I reviewed today's visit with the patient in addition to providing specific details for the plan of care and counseling regarding the diagnosis and prognosis. Questions are answered at this time and are agreeable with the plan. ASSESSMENT     1.  Cough      This patient's ED course included: a personal history and physicial examination and multiple bedside re-evaluations  This patient has remained hemodynamically stable during their ED course. PLAN   Discharge home. Patient condition is stable. New Medications     Discharge Medication List as of 2/5/2021 12:54 PM        Electronically signed by Rosalina Miller PA-C   DD: 2/5/21  **This report was transcribed using voice recognition software. Every effort was made to ensure accuracy; however, inadvertent computerized transcription errors may be present.   Keisha Schwarz PA-C  02/05/21 0688

## 2021-02-06 ENCOUNTER — APPOINTMENT (OUTPATIENT)
Dept: CT IMAGING | Age: 66
End: 2021-02-06
Payer: MEDICARE

## 2021-02-06 ENCOUNTER — CARE COORDINATION (OUTPATIENT)
Dept: CARE COORDINATION | Age: 66
End: 2021-02-06

## 2021-02-06 ENCOUNTER — HOSPITAL ENCOUNTER (OUTPATIENT)
Age: 66
Setting detail: OBSERVATION
Discharge: HOME | End: 2021-02-08
Attending: EMERGENCY MEDICINE | Admitting: INTERNAL MEDICINE
Payer: MEDICARE

## 2021-02-06 ENCOUNTER — APPOINTMENT (OUTPATIENT)
Dept: GENERAL RADIOLOGY | Age: 66
End: 2021-02-06
Payer: MEDICARE

## 2021-02-06 DIAGNOSIS — Z79.52 IMMUNOCOMPROMISED DUE TO CORTICOSTEROIDS (HCC): ICD-10-CM

## 2021-02-06 DIAGNOSIS — U07.1 COVID-19: Primary | ICD-10-CM

## 2021-02-06 DIAGNOSIS — E86.0 DEHYDRATION: ICD-10-CM

## 2021-02-06 DIAGNOSIS — R53.1 GENERAL WEAKNESS: ICD-10-CM

## 2021-02-06 DIAGNOSIS — T38.0X5A IMMUNOCOMPROMISED DUE TO CORTICOSTEROIDS (HCC): ICD-10-CM

## 2021-02-06 DIAGNOSIS — D84.821 IMMUNOCOMPROMISED DUE TO CORTICOSTEROIDS (HCC): ICD-10-CM

## 2021-02-06 DIAGNOSIS — R53.83 LETHARGY: ICD-10-CM

## 2021-02-06 LAB
ALBUMIN SERPL-MCNC: 3.6 G/DL (ref 3.5–5.2)
ALP BLD-CCNC: 67 U/L (ref 40–129)
ALT SERPL-CCNC: 10 U/L (ref 0–40)
ANION GAP SERPL CALCULATED.3IONS-SCNC: 12 MMOL/L (ref 7–16)
ANISOCYTOSIS: ABNORMAL
AST SERPL-CCNC: 15 U/L (ref 0–39)
BASOPHILS ABSOLUTE: 0.01 E9/L (ref 0–0.2)
BASOPHILS RELATIVE PERCENT: 0.1 % (ref 0–2)
BILIRUB SERPL-MCNC: 0.6 MG/DL (ref 0–1.2)
BUN BLDV-MCNC: 16 MG/DL (ref 8–23)
CALCIUM SERPL-MCNC: 8.1 MG/DL (ref 8.6–10.2)
CHLORIDE BLD-SCNC: 95 MMOL/L (ref 98–107)
CO2: 28 MMOL/L (ref 22–29)
CREAT SERPL-MCNC: 1.2 MG/DL (ref 0.7–1.2)
EOSINOPHILS ABSOLUTE: 0 E9/L (ref 0.05–0.5)
EOSINOPHILS RELATIVE PERCENT: 0 % (ref 0–6)
GFR AFRICAN AMERICAN: >60
GFR NON-AFRICAN AMERICAN: >60 ML/MIN/1.73
GLUCOSE BLD-MCNC: 239 MG/DL (ref 74–99)
HCT VFR BLD CALC: 37.5 % (ref 37–54)
HEMOGLOBIN: 12 G/DL (ref 12.5–16.5)
IMMATURE GRANULOCYTES #: 0.05 E9/L
IMMATURE GRANULOCYTES %: 0.6 % (ref 0–5)
LACTIC ACID: 1.8 MMOL/L (ref 0.5–2.2)
LYMPHOCYTES ABSOLUTE: 0.3 E9/L (ref 1.5–4)
LYMPHOCYTES RELATIVE PERCENT: 3.6 % (ref 20–42)
MCH RBC QN AUTO: 28.1 PG (ref 26–35)
MCHC RBC AUTO-ENTMCNC: 32 % (ref 32–34.5)
MCV RBC AUTO: 87.8 FL (ref 80–99.9)
MONOCYTES ABSOLUTE: 0.25 E9/L (ref 0.1–0.95)
MONOCYTES RELATIVE PERCENT: 3 % (ref 2–12)
NEUTROPHILS ABSOLUTE: 7.75 E9/L (ref 1.8–7.3)
NEUTROPHILS RELATIVE PERCENT: 92.7 % (ref 43–80)
OVALOCYTES: ABNORMAL
PDW BLD-RTO: 15 FL (ref 11.5–15)
PLATELET # BLD: 107 E9/L (ref 130–450)
PMV BLD AUTO: 8.8 FL (ref 7–12)
POIKILOCYTES: ABNORMAL
POLYCHROMASIA: ABNORMAL
POTASSIUM SERPL-SCNC: 3.8 MMOL/L (ref 3.5–5)
RBC # BLD: 4.27 E12/L (ref 3.8–5.8)
SARS-COV-2, NAAT: DETECTED
SODIUM BLD-SCNC: 135 MMOL/L (ref 132–146)
TOTAL PROTEIN: 6.4 G/DL (ref 6.4–8.3)
TROPONIN: <0.01 NG/ML (ref 0–0.03)
WBC # BLD: 8.4 E9/L (ref 4.5–11.5)

## 2021-02-06 PROCEDURE — 96360 HYDRATION IV INFUSION INIT: CPT

## 2021-02-06 PROCEDURE — U0002 COVID-19 LAB TEST NON-CDC: HCPCS

## 2021-02-06 PROCEDURE — 96361 HYDRATE IV INFUSION ADD-ON: CPT

## 2021-02-06 PROCEDURE — 84484 ASSAY OF TROPONIN QUANT: CPT

## 2021-02-06 PROCEDURE — 99284 EMERGENCY DEPT VISIT MOD MDM: CPT

## 2021-02-06 PROCEDURE — 2580000003 HC RX 258: Performed by: EMERGENCY MEDICINE

## 2021-02-06 PROCEDURE — 70450 CT HEAD/BRAIN W/O DYE: CPT

## 2021-02-06 PROCEDURE — 83605 ASSAY OF LACTIC ACID: CPT

## 2021-02-06 PROCEDURE — 6370000000 HC RX 637 (ALT 250 FOR IP): Performed by: INTERNAL MEDICINE

## 2021-02-06 PROCEDURE — 85025 COMPLETE CBC W/AUTO DIFF WBC: CPT

## 2021-02-06 PROCEDURE — 93005 ELECTROCARDIOGRAM TRACING: CPT | Performed by: EMERGENCY MEDICINE

## 2021-02-06 PROCEDURE — G0378 HOSPITAL OBSERVATION PER HR: HCPCS

## 2021-02-06 PROCEDURE — 96374 THER/PROPH/DIAG INJ IV PUSH: CPT

## 2021-02-06 PROCEDURE — 36415 COLL VENOUS BLD VENIPUNCTURE: CPT

## 2021-02-06 PROCEDURE — 6370000000 HC RX 637 (ALT 250 FOR IP): Performed by: EMERGENCY MEDICINE

## 2021-02-06 PROCEDURE — 2580000003 HC RX 258: Performed by: INTERNAL MEDICINE

## 2021-02-06 PROCEDURE — 71045 X-RAY EXAM CHEST 1 VIEW: CPT

## 2021-02-06 PROCEDURE — 99220 PR INITIAL OBSERVATION CARE/DAY 70 MINUTES: CPT | Performed by: INTERNAL MEDICINE

## 2021-02-06 PROCEDURE — 80053 COMPREHEN METABOLIC PANEL: CPT

## 2021-02-06 PROCEDURE — 2580000003 HC RX 258: Performed by: FAMILY MEDICINE

## 2021-02-06 PROCEDURE — 87040 BLOOD CULTURE FOR BACTERIA: CPT

## 2021-02-06 PROCEDURE — 84145 PROCALCITONIN (PCT): CPT

## 2021-02-06 RX ORDER — NICOTINE POLACRILEX 4 MG
15 LOZENGE BUCCAL PRN
Status: DISCONTINUED | OUTPATIENT
Start: 2021-02-06 | End: 2021-02-08 | Stop reason: HOSPADM

## 2021-02-06 RX ORDER — DEXTROSE MONOHYDRATE 50 MG/ML
INJECTION, SOLUTION INTRAVENOUS CONTINUOUS
Status: DISCONTINUED | OUTPATIENT
Start: 2021-02-06 | End: 2021-02-07

## 2021-02-06 RX ORDER — FERROUS SULFATE 325(65) MG
325 TABLET ORAL
Status: DISCONTINUED | OUTPATIENT
Start: 2021-02-07 | End: 2021-02-08 | Stop reason: HOSPADM

## 2021-02-06 RX ORDER — DEXTROSE MONOHYDRATE 25 G/50ML
12.5 INJECTION, SOLUTION INTRAVENOUS PRN
Status: DISCONTINUED | OUTPATIENT
Start: 2021-02-06 | End: 2021-02-08 | Stop reason: HOSPADM

## 2021-02-06 RX ORDER — PROMETHAZINE HYDROCHLORIDE 25 MG/1
12.5 TABLET ORAL EVERY 6 HOURS PRN
Status: DISCONTINUED | OUTPATIENT
Start: 2021-02-06 | End: 2021-02-08 | Stop reason: HOSPADM

## 2021-02-06 RX ORDER — LISINOPRIL 20 MG/1
20 TABLET ORAL DAILY
Status: DISCONTINUED | OUTPATIENT
Start: 2021-02-07 | End: 2021-02-08 | Stop reason: HOSPADM

## 2021-02-06 RX ORDER — ACETAMINOPHEN 325 MG/1
650 TABLET ORAL EVERY 6 HOURS PRN
Status: DISCONTINUED | OUTPATIENT
Start: 2021-02-06 | End: 2021-02-08 | Stop reason: HOSPADM

## 2021-02-06 RX ORDER — BALSALAZIDE DISODIUM 750 MG/1
750 CAPSULE ORAL 3 TIMES DAILY
Status: DISCONTINUED | OUTPATIENT
Start: 2021-02-06 | End: 2021-02-08 | Stop reason: HOSPADM

## 2021-02-06 RX ORDER — INSULIN GLARGINE 100 [IU]/ML
28 INJECTION, SOLUTION SUBCUTANEOUS EVERY MORNING
Status: DISCONTINUED | OUTPATIENT
Start: 2021-02-07 | End: 2021-02-07

## 2021-02-06 RX ORDER — 0.9 % SODIUM CHLORIDE 0.9 %
1000 INTRAVENOUS SOLUTION INTRAVENOUS ONCE
Status: COMPLETED | OUTPATIENT
Start: 2021-02-06 | End: 2021-02-06

## 2021-02-06 RX ORDER — SODIUM CHLORIDE 0.9 % (FLUSH) 0.9 %
10 SYRINGE (ML) INJECTION PRN
Status: DISCONTINUED | OUTPATIENT
Start: 2021-02-06 | End: 2021-02-08 | Stop reason: HOSPADM

## 2021-02-06 RX ORDER — MERCAPTOPURINE 50 MG/1
125 TABLET ORAL DAILY
Status: DISCONTINUED | OUTPATIENT
Start: 2021-02-07 | End: 2021-02-07

## 2021-02-06 RX ORDER — PRAVASTATIN SODIUM 20 MG
20 TABLET ORAL DAILY
Status: DISCONTINUED | OUTPATIENT
Start: 2021-02-07 | End: 2021-02-08 | Stop reason: HOSPADM

## 2021-02-06 RX ORDER — INSULIN GLARGINE 100 [IU]/ML
14 INJECTION, SOLUTION SUBCUTANEOUS EVERY MORNING
Status: DISCONTINUED | OUTPATIENT
Start: 2021-02-07 | End: 2021-02-06

## 2021-02-06 RX ORDER — AMLODIPINE BESYLATE AND BENAZEPRIL HYDROCHLORIDE 10; 20 MG/1; MG/1
1 CAPSULE ORAL DAILY
Status: DISCONTINUED | OUTPATIENT
Start: 2021-02-07 | End: 2021-02-06 | Stop reason: CLARIF

## 2021-02-06 RX ORDER — METOPROLOL SUCCINATE 25 MG/1
50 TABLET, EXTENDED RELEASE ORAL DAILY
Status: DISCONTINUED | OUTPATIENT
Start: 2021-02-07 | End: 2021-02-08 | Stop reason: HOSPADM

## 2021-02-06 RX ORDER — ASPIRIN 81 MG/1
81 TABLET ORAL DAILY
Status: DISCONTINUED | OUTPATIENT
Start: 2021-02-07 | End: 2021-02-08 | Stop reason: HOSPADM

## 2021-02-06 RX ORDER — ACETAMINOPHEN 650 MG/1
650 SUPPOSITORY RECTAL EVERY 6 HOURS PRN
Status: DISCONTINUED | OUTPATIENT
Start: 2021-02-06 | End: 2021-02-08 | Stop reason: HOSPADM

## 2021-02-06 RX ORDER — DEXTROSE MONOHYDRATE 50 MG/ML
100 INJECTION, SOLUTION INTRAVENOUS PRN
Status: DISCONTINUED | OUTPATIENT
Start: 2021-02-06 | End: 2021-02-08 | Stop reason: HOSPADM

## 2021-02-06 RX ORDER — ONDANSETRON 2 MG/ML
4 INJECTION INTRAMUSCULAR; INTRAVENOUS EVERY 6 HOURS PRN
Status: DISCONTINUED | OUTPATIENT
Start: 2021-02-06 | End: 2021-02-08 | Stop reason: HOSPADM

## 2021-02-06 RX ORDER — TERAZOSIN 1 MG/1
3 CAPSULE ORAL 2 TIMES DAILY
Status: DISCONTINUED | OUTPATIENT
Start: 2021-02-06 | End: 2021-02-06 | Stop reason: CLARIF

## 2021-02-06 RX ORDER — DICYCLOMINE HYDROCHLORIDE 10 MG/1
10 CAPSULE ORAL
Status: DISCONTINUED | OUTPATIENT
Start: 2021-02-06 | End: 2021-02-08 | Stop reason: HOSPADM

## 2021-02-06 RX ORDER — POLYETHYLENE GLYCOL 3350 17 G/17G
17 POWDER, FOR SOLUTION ORAL DAILY PRN
Status: DISCONTINUED | OUTPATIENT
Start: 2021-02-06 | End: 2021-02-08 | Stop reason: HOSPADM

## 2021-02-06 RX ORDER — POTASSIUM CHLORIDE 1.5 G/1.77G
20 POWDER, FOR SOLUTION ORAL 2 TIMES DAILY
Status: DISCONTINUED | OUTPATIENT
Start: 2021-02-06 | End: 2021-02-06 | Stop reason: CLARIF

## 2021-02-06 RX ORDER — SODIUM CHLORIDE 0.9 % (FLUSH) 0.9 %
10 SYRINGE (ML) INJECTION EVERY 12 HOURS SCHEDULED
Status: DISCONTINUED | OUTPATIENT
Start: 2021-02-06 | End: 2021-02-08 | Stop reason: HOSPADM

## 2021-02-06 RX ORDER — VITAMIN B COMPLEX
2000 TABLET ORAL 2 TIMES DAILY
Status: DISCONTINUED | OUTPATIENT
Start: 2021-02-06 | End: 2021-02-08 | Stop reason: HOSPADM

## 2021-02-06 RX ORDER — ACETAMINOPHEN 325 MG/1
650 TABLET ORAL ONCE
Status: COMPLETED | OUTPATIENT
Start: 2021-02-06 | End: 2021-02-06

## 2021-02-06 RX ORDER — HYDROCHLOROTHIAZIDE 25 MG/1
25 TABLET ORAL DAILY
Status: DISCONTINUED | OUTPATIENT
Start: 2021-02-07 | End: 2021-02-08 | Stop reason: HOSPADM

## 2021-02-06 RX ORDER — AMLODIPINE BESYLATE 10 MG/1
10 TABLET ORAL DAILY
Status: DISCONTINUED | OUTPATIENT
Start: 2021-02-07 | End: 2021-02-08 | Stop reason: HOSPADM

## 2021-02-06 RX ORDER — PREDNISONE 20 MG/1
10 TABLET ORAL DAILY
Status: DISCONTINUED | OUTPATIENT
Start: 2021-02-07 | End: 2021-02-07

## 2021-02-06 RX ADMIN — DEXTROSE MONOHYDRATE: 50 INJECTION, SOLUTION INTRAVENOUS at 23:42

## 2021-02-06 RX ADMIN — ACETAMINOPHEN 650 MG: 325 TABLET ORAL at 19:09

## 2021-02-06 RX ADMIN — POTASSIUM BICARBONATE 20 MEQ: 782 TABLET, EFFERVESCENT ORAL at 23:43

## 2021-02-06 RX ADMIN — SODIUM CHLORIDE 1000 ML: 9 INJECTION, SOLUTION INTRAVENOUS at 19:51

## 2021-02-06 RX ADMIN — Medication 2000 UNITS: at 23:43

## 2021-02-06 RX ADMIN — SODIUM CHLORIDE 1000 ML: 9 INJECTION, SOLUTION INTRAVENOUS at 19:09

## 2021-02-06 RX ADMIN — DICYCLOMINE HYDROCHLORIDE 10 MG: 10 CAPSULE ORAL at 23:42

## 2021-02-06 ASSESSMENT — ENCOUNTER SYMPTOMS
VOICE CHANGE: 1
DIARRHEA: 0
VOMITING: 0
RHINORRHEA: 0
NAUSEA: 0
COUGH: 1
ABDOMINAL PAIN: 0
SHORTNESS OF BREATH: 0

## 2021-02-06 NOTE — ED PROVIDER NOTES
ED Provider Note  02/06/21    Patient: Savana Rico  Age: 77 y.o. Sex: male    Subjective:  Chief Complaint   Patient presents with    Fatigue    Fever    Anorexia       HPI  Patient has a history of ulcerative colitis and is on chronic steroids, currently prednisone 40mg. He has been in a flare for the past year and has had a stay recently in [facility]. Began developing URI symptoms on Wednesday, called his PCP on Friday and was advised to be seen at the ED. He went to the ED Friday, had covid test (not yet back) and a CXR (unremarkable). Patient woke up today and has been unable to get out of bed or eat or drink due to no appetite. Wife is with him today and states he was only able to get up to use the restroom once today and was unable to help her hold the thermometer when she was taking his temperature. She states she cannot keep him awake. He has a history of atrial fibrillation and has Dr. Stephie Carreon for cardiology. Due to his UC, he is unable to have anticoagulation for his atrial fibrillation. The history is provided by the spouse. URI  Presenting symptoms: congestion, cough (nighttime), fatigue and fever (101.6F)    Presenting symptoms: no rhinorrhea    Severity:  Severe  Onset quality:  Sudden  Duration:  4 days  Timing:  Constant  Progression:  Worsening  Chronicity:  New  Relieved by:  Nothing  Worsened by:  Nothing  Ineffective treatments:  None tried  Associated symptoms: no arthralgias and no myalgias    Risk factors: immunosuppression       Review of Systems   Constitutional: Positive for fatigue and fever (101.6F). Negative for chills. HENT: Positive for congestion and voice change. Negative for rhinorrhea. Respiratory: Positive for cough (nighttime). Negative for shortness of breath. Cardiovascular: Negative for chest pain and palpitations. Gastrointestinal: Negative for abdominal pain, diarrhea, nausea and vomiting.    Musculoskeletal: Negative for arthralgias and 1946 Troponin: <0.01 [SB]   1952 Heart rate improved after 1L NS bolus. Additional 1L NS bolus ordered.     [SB]      ED Course User Index  [SB] DO Titi Velez DO Resident  02/06/21 2025

## 2021-02-07 LAB
ANION GAP SERPL CALCULATED.3IONS-SCNC: 10 MMOL/L (ref 7–16)
BASOPHILS ABSOLUTE: 0 E9/L (ref 0–0.2)
BASOPHILS RELATIVE PERCENT: 0 % (ref 0–2)
BUN BLDV-MCNC: 18 MG/DL (ref 8–23)
CALCIUM SERPL-MCNC: 7.9 MG/DL (ref 8.6–10.2)
CHLORIDE BLD-SCNC: 98 MMOL/L (ref 98–107)
CO2: 26 MMOL/L (ref 22–29)
CREAT SERPL-MCNC: 1 MG/DL (ref 0.7–1.2)
EKG ATRIAL RATE: 115 BPM
EKG Q-T INTERVAL: 292 MS
EKG QRS DURATION: 88 MS
EKG QTC CALCULATION (BAZETT): 416 MS
EKG R AXIS: 45 DEGREES
EKG T AXIS: 47 DEGREES
EKG VENTRICULAR RATE: 122 BPM
EOSINOPHILS ABSOLUTE: 0 E9/L (ref 0.05–0.5)
EOSINOPHILS RELATIVE PERCENT: 0 % (ref 0–6)
GFR AFRICAN AMERICAN: >60
GFR NON-AFRICAN AMERICAN: >60 ML/MIN/1.73
GLUCOSE BLD-MCNC: 314 MG/DL (ref 74–99)
HCT VFR BLD CALC: 32.9 % (ref 37–54)
HEMOGLOBIN: 10.6 G/DL (ref 12.5–16.5)
LYMPHOCYTES ABSOLUTE: 0.17 E9/L (ref 1.5–4)
LYMPHOCYTES RELATIVE PERCENT: 2.6 % (ref 20–42)
MCH RBC QN AUTO: 28.3 PG (ref 26–35)
MCHC RBC AUTO-ENTMCNC: 32.2 % (ref 32–34.5)
MCV RBC AUTO: 87.7 FL (ref 80–99.9)
METER GLUCOSE: 157 MG/DL (ref 74–99)
METER GLUCOSE: 193 MG/DL (ref 74–99)
METER GLUCOSE: 215 MG/DL (ref 74–99)
METER GLUCOSE: 318 MG/DL (ref 74–99)
MONOCYTES ABSOLUTE: 0.11 E9/L (ref 0.1–0.95)
MONOCYTES RELATIVE PERCENT: 1.7 % (ref 2–12)
NEUTROPHILS ABSOLUTE: 5.28 E9/L (ref 1.8–7.3)
NEUTROPHILS RELATIVE PERCENT: 95.7 % (ref 43–80)
NUCLEATED RED BLOOD CELLS: 0 /100 WBC
OVALOCYTES: ABNORMAL
PDW BLD-RTO: 14.9 FL (ref 11.5–15)
PLATELET # BLD: 102 E9/L (ref 130–450)
PMV BLD AUTO: 9.1 FL (ref 7–12)
POIKILOCYTES: ABNORMAL
POLYCHROMASIA: ABNORMAL
POTASSIUM REFLEX MAGNESIUM: 4.3 MMOL/L (ref 3.5–5)
PROCALCITONIN: 0.15 NG/ML (ref 0–0.08)
PROCALCITONIN: 0.39 NG/ML (ref 0–0.08)
RBC # BLD: 3.75 E12/L (ref 3.8–5.8)
SARS-COV-2: DETECTED
SODIUM BLD-SCNC: 134 MMOL/L (ref 132–146)
SOURCE: ABNORMAL
TEAR DROP CELLS: ABNORMAL
WBC # BLD: 5.5 E9/L (ref 4.5–11.5)

## 2021-02-07 PROCEDURE — 80048 BASIC METABOLIC PNL TOTAL CA: CPT

## 2021-02-07 PROCEDURE — 82962 GLUCOSE BLOOD TEST: CPT

## 2021-02-07 PROCEDURE — 99226 PR SBSQ OBSERVATION CARE/DAY 35 MINUTES: CPT | Performed by: INTERNAL MEDICINE

## 2021-02-07 PROCEDURE — 93010 ELECTROCARDIOGRAM REPORT: CPT | Performed by: INTERNAL MEDICINE

## 2021-02-07 PROCEDURE — 6360000002 HC RX W HCPCS: Performed by: INTERNAL MEDICINE

## 2021-02-07 PROCEDURE — 85025 COMPLETE CBC W/AUTO DIFF WBC: CPT

## 2021-02-07 PROCEDURE — 96372 THER/PROPH/DIAG INJ SC/IM: CPT

## 2021-02-07 PROCEDURE — 2580000003 HC RX 258: Performed by: INTERNAL MEDICINE

## 2021-02-07 PROCEDURE — 6370000000 HC RX 637 (ALT 250 FOR IP): Performed by: INTERNAL MEDICINE

## 2021-02-07 PROCEDURE — 84145 PROCALCITONIN (PCT): CPT

## 2021-02-07 PROCEDURE — 36415 COLL VENOUS BLD VENIPUNCTURE: CPT

## 2021-02-07 PROCEDURE — G0378 HOSPITAL OBSERVATION PER HR: HCPCS

## 2021-02-07 RX ORDER — TERAZOSIN 5 MG/1
5 CAPSULE ORAL NIGHTLY
Status: DISCONTINUED | OUTPATIENT
Start: 2021-02-07 | End: 2021-02-07 | Stop reason: CLARIF

## 2021-02-07 RX ORDER — DOXAZOSIN MESYLATE 4 MG/1
4 TABLET ORAL NIGHTLY
Status: DISCONTINUED | OUTPATIENT
Start: 2021-02-07 | End: 2021-02-08 | Stop reason: HOSPADM

## 2021-02-07 RX ORDER — POTASSIUM CHLORIDE 20 MEQ/1
40 TABLET, EXTENDED RELEASE ORAL
Status: DISCONTINUED | OUTPATIENT
Start: 2021-02-07 | End: 2021-02-08 | Stop reason: HOSPADM

## 2021-02-07 RX ORDER — INSULIN GLARGINE 100 [IU]/ML
32 INJECTION, SOLUTION SUBCUTANEOUS EVERY MORNING
Status: DISCONTINUED | OUTPATIENT
Start: 2021-02-08 | End: 2021-02-08 | Stop reason: HOSPADM

## 2021-02-07 RX ORDER — POTASSIUM CHLORIDE 20 MEQ/1
20 TABLET, EXTENDED RELEASE ORAL
Status: DISCONTINUED | OUTPATIENT
Start: 2021-02-07 | End: 2021-02-08 | Stop reason: HOSPADM

## 2021-02-07 RX ORDER — DOXYCYCLINE HYCLATE 100 MG/1
100 CAPSULE ORAL EVERY 12 HOURS SCHEDULED
Status: DISCONTINUED | OUTPATIENT
Start: 2021-02-07 | End: 2021-02-08 | Stop reason: HOSPADM

## 2021-02-07 RX ORDER — LIDOCAINE HYDROCHLORIDE 20 MG/ML
15 SOLUTION OROPHARYNGEAL
Status: DISCONTINUED | OUTPATIENT
Start: 2021-02-07 | End: 2021-02-08 | Stop reason: HOSPADM

## 2021-02-07 RX ORDER — TERAZOSIN 2 MG/1
2 CAPSULE ORAL EVERY MORNING
COMMUNITY

## 2021-02-07 RX ADMIN — INSULIN LISPRO 2 UNITS: 100 INJECTION, SOLUTION INTRAVENOUS; SUBCUTANEOUS at 16:13

## 2021-02-07 RX ADMIN — DICYCLOMINE HYDROCHLORIDE 10 MG: 10 CAPSULE ORAL at 19:47

## 2021-02-07 RX ADMIN — ENOXAPARIN SODIUM 40 MG: 40 INJECTION SUBCUTANEOUS at 09:11

## 2021-02-07 RX ADMIN — Medication 2000 UNITS: at 19:46

## 2021-02-07 RX ADMIN — POTASSIUM CHLORIDE 20 MEQ: 20 TABLET, EXTENDED RELEASE ORAL at 16:13

## 2021-02-07 RX ADMIN — PREDNISONE 35 MG: 20 TABLET ORAL at 11:33

## 2021-02-07 RX ADMIN — ENOXAPARIN SODIUM 30 MG: 100 INJECTION SUBCUTANEOUS at 19:47

## 2021-02-07 RX ADMIN — FERROUS SULFATE TAB 325 MG (65 MG ELEMENTAL FE) 325 MG: 325 (65 FE) TAB at 09:11

## 2021-02-07 RX ADMIN — DOXYCYCLINE HYCLATE 100 MG: 100 CAPSULE ORAL at 19:47

## 2021-02-07 RX ADMIN — INSULIN LISPRO 1 UNITS: 100 INJECTION, SOLUTION INTRAVENOUS; SUBCUTANEOUS at 22:14

## 2021-02-07 RX ADMIN — LISINOPRIL 20 MG: 20 TABLET ORAL at 09:11

## 2021-02-07 RX ADMIN — Medication 10 ML: at 09:11

## 2021-02-07 RX ADMIN — DOXYCYCLINE HYCLATE 100 MG: 100 CAPSULE ORAL at 09:11

## 2021-02-07 RX ADMIN — DICYCLOMINE HYDROCHLORIDE 10 MG: 10 CAPSULE ORAL at 11:33

## 2021-02-07 RX ADMIN — METOPROLOL SUCCINATE 50 MG: 25 TABLET, EXTENDED RELEASE ORAL at 09:11

## 2021-02-07 RX ADMIN — AMLODIPINE BESYLATE 10 MG: 10 TABLET ORAL at 09:11

## 2021-02-07 RX ADMIN — DOXAZOSIN 4 MG: 4 TABLET ORAL at 19:47

## 2021-02-07 RX ADMIN — INSULIN GLARGINE 28 UNITS: 100 INJECTION, SOLUTION SUBCUTANEOUS at 10:22

## 2021-02-07 RX ADMIN — POTASSIUM CHLORIDE 40 MEQ: 20 TABLET, EXTENDED RELEASE ORAL at 11:32

## 2021-02-07 RX ADMIN — PRAVASTATIN SODIUM 20 MG: 20 TABLET ORAL at 09:11

## 2021-02-07 RX ADMIN — ASPIRIN 81 MG: 81 TABLET, COATED ORAL at 09:11

## 2021-02-07 RX ADMIN — BALSALAZIDE DISODIUM 750 MG: 750 CAPSULE ORAL at 19:49

## 2021-02-07 RX ADMIN — DOXAZOSIN 6 MG: 2 TABLET ORAL at 11:32

## 2021-02-07 RX ADMIN — HYDROCHLOROTHIAZIDE 25 MG: 25 TABLET ORAL at 09:11

## 2021-02-07 RX ADMIN — Medication 2000 UNITS: at 09:11

## 2021-02-07 RX ADMIN — Medication 10 ML: at 19:52

## 2021-02-07 RX ADMIN — BALSALAZIDE DISODIUM 750 MG: 750 CAPSULE ORAL at 14:00

## 2021-02-07 RX ADMIN — DICYCLOMINE HYDROCHLORIDE 10 MG: 10 CAPSULE ORAL at 16:13

## 2021-02-07 RX ADMIN — DICYCLOMINE HYDROCHLORIDE 10 MG: 10 CAPSULE ORAL at 06:36

## 2021-02-07 RX ADMIN — INSULIN LISPRO 8 UNITS: 100 INJECTION, SOLUTION INTRAVENOUS; SUBCUTANEOUS at 09:00

## 2021-02-07 NOTE — PROGRESS NOTES
[facility]) Hospitalist Progress Note    Admission Date  2/6/2021  6:28 PM  Chief Complaint Fatigue, fever, loss of appetite  Admit Dx   COVID-19 [U07.1]    Subjective  History of Present Illness  Seen at bedside in room today, has a cough that he says is getting worse but minimally productive. No overt fevers, chills, nausea, emesis. Admitting to some mild diarrhea but pt also has UC so this is relatively normal for him. Real c/o are just fatigue and loss of appetite. Pt says he is feeling a bit worse today than yesterday. Discussed w pt he is not candidate for remdesivir and Decadron (though chronically on prednisone) currently; will observe today and if needs O2 will start remdesivir otherwise plan on dc in AM and pt in agreement. Review of Systems - 12-point review of systems has been reviewed and is otherwise negative except as listed in the HPI    Objective  Physical Exam  Vitals: /81   Pulse 97   Temp 99.4 °F (37.4 °C) (Oral)   Resp 16   Ht 6' 1\" (1.854 m)   Wt 182 lb (82.6 kg)   SpO2 97%   BMI 24.01 kg/m²   General: well-developed, well-nourished, no acute distress, cooperative  Skin: warm, dry, intact, normal color without cyanosis  HEENT: normocephalic, atraumatic, mucous membranes normal  Respiratory: clear to auscultation bilaterally without respiratory distress  Cardiovascular: regular rate and rhythm without murmur / rub / gallop  Abdominal: soft, nontender, nondistended, normoactive bowel sounds  Extremities: no mottling, pulses intact, no edema  Neurologic: awake, alert, no focal deficits  Psychiatric: normal affect, cooperative    Assessment / Plan  COVID pneumonia - 96% on room air in triage/ED and no O2 needed since. Does not meet sepsis criteria. RFA positive for COVID in ED and CXR showed bilateral airspace opacities. Currently not candidate for remdesivir.   BMI <30 and CrCl ~80; Lovenox 30 mg SQ bid for DVT ppx    Suspected CAP - consolidation noted RLL and pt on doxycycline, procal a bit elevated at 0.39 but no leukocytosis. Re-check procal and CXR in AM to determine whether or not to continue doxy    IDDM - resume home Lantus, diabetic diet / BG checks / ISS ACHS, target -180 w hypoglycemic protocol    Pt's PMH otherwise pertinent for HPL, HTN, ulcerative colitis. Continue outpt med regimen; if any particular issues regarding these items, will address and move to active problem list above.      Code status  Full  DVT prophylaxis Lovenox  Disposition  Home potentially as early as tomorrow    Electronically signed by Varney Aase, DO on 2/7/2021 at 2:33 PM

## 2021-02-07 NOTE — ED NOTES
RN faxed SBAR to floor. Confirmation received and spoke with Marium. Pt ready for transport to room.        Michelle Snyder RN  02/06/21 2052

## 2021-02-07 NOTE — H&P
[facility] History and Physical      CHIEF COMPLAINT: Tiredness for few days. History of Present Illness: This is a 64-year-old  male with past medical history of hypertension, hyperlipidemia, diabetes mellitus and ulcerative colitis came from home due to severe tiredness and weakness. History taken from the patient at the bedside, he developed tiredness which is progressively worsened for couple of days and he lost his appetite as well. He denies any shortness of breath currently or any chest pain. His Covid test positive here. He denies any fever, diarrhea or any abdominal pain. Vitals in ER blood pressure 122/75, pulse 108, respirations 16 and temperature 99.4 °F.  His blood chemistry shows sodium 135, potassium 3.8, chloride 95 and bicarb 20, BUN 16 and creatinine 1.2. Sugar 239. His WBC count 8.4 hemoglobin 12 and platelet count 162. Patient x-ray shows right lingular lobe infiltrate. Informant(s) for H&P: Patient and EMR. REVIEW OF SYSTEMS:  A comprehensive review of systems was negative except for: what is in the HPI      PMH:  Past Medical History:   Diagnosis Date    Coronary atherosclerosis of native coronary artery     Diabetes mellitus ([facility] [address]     Hyperlipidemia     Hypertension     Ulcerative colitis ([facility] [address]        Surgical History:  Past Surgical History:   Procedure Laterality Date    DIAGNOSTIC CARDIAC CATH LAB PROCEDURE      SIGMOIDOSCOPY         Medications Prior to Admission:    Prior to Admission medications    Medication Sig Start Date End Date Taking?  Authorizing Provider   ferrous sulfate (IRON 325) 325 (65 Fe) MG tablet Take 325 mg by mouth daily (with breakfast)    Historical Provider, MD   Turmeric 500 MG CAPS Take 500 mg by mouth daily    Historical Provider, MD   ZINC OXIDE, TOPICAL, 10 % CREA Apply topically 4 times daily as needed 6/15/20   Historical Provider, MD   Adalimumab (HUMIRA) 40 MG/0.4ML PSKT Inject 40 mg into the skin Every two weeks    Historical Provider, MD   predniSONE (DELTASONE) 10 MG tablet Take 10 mg by mouth daily     Historical Provider, MD   dicyclomine (BENTYL) 10 MG capsule Take 10 mg by mouth 4 times daily (before meals and nightly)    Historical Provider, MD   CANASA 1000 MG suppository unwrap and insert 1 suppository rectally at bedtime 3/18/18   Historical Provider, MD   metoprolol succinate (TOPROL XL) 50 MG extended release tablet Take 50 mg by mouth daily  4/7/17   Historical Provider, MD   CIALIS 20 MG tablet Take 20 mg by mouth as needed  3/2/17   Historical Provider, MD   insulin aspart protamine-insulin aspart (NOVOLOG MIX 70/30 FLEXPEN) (70-30) 100 UNIT/ML injection Inject  into the skin as needed. Sliding scale as needed    Historical Provider, MD   potassium chloride (KLOR-CON) 20 MEQ packet Take 20 mEq by mouth 2 times daily. Takes 2 tabs am, 1 tab pm    Historical Provider, MD   MERCAPTOPURINE PO Take 125 mg by mouth. 2.5 tabs a day    Historical Provider, MD   amlodipine-benazepril (LOTREL) 10-20 MG per capsule Take 1 capsule by mouth daily. Historical Provider, MD   terazosin (HYTRIN) 2 MG capsule Take 3 mg by mouth 2 times daily Indications: Takes 7 mg in the am 5 mg the pm     Historical Provider, MD   hydrochlorothiazide (HYDRODIURIL) 25 MG tablet Take 25 mg by mouth daily. Historical Provider, MD   pravastatin (PRAVACHOL) 20 MG tablet Take 20 mg by mouth daily. Historical Provider, MD   aspirin 81 MG EC tablet Take 81 mg by mouth daily. Historical Provider, MD   balsalazide (COLAZAL) 750 MG capsule Take 750 mg by mouth 3 times daily. Historical Provider, MD   Testosterone (ANDROGEL TD) Place  onto the skin daily.  pump    Historical Provider, MD   Cholecalciferol (VITAMIN D PO) Take 2,000 Units by mouth 2 times daily     Historical Provider, MD   insulin glargine (LANTUS) 100 UNIT/ML injection Inject 28 Units into the skin every morning     Historical Provider, MD distress, only symptom patient have tiredness and loss of appetite, monitor patient and if necessary give oxygen via nasal cannula. Continue IV fluid. 2.  Pneumonia: Most likely patient have bacterial pneumonia, chest x-ray shows consolidation. I will start him on antibiotic orally and follow urinary antigen and blood cultures. 3.  Hypertension: Continue his Lotrel and hydralazine as home dose which is ordered. 4.  Diabetes mellitus: Continue his Lantus and monitor sugar with insulin coverage. 5.  Ulcerative colitis: Continue his Humira injection every 2-week, and mesalamine orally. 6.  Thrombocytopenia: Monitor platelet count, due to Covid 19 infection. Code Status: Full  DVT prophylaxis: Lovenox subcu      NOTE: This report was transcribed using voice recognition software. Every effort was made to ensure accuracy; however, inadvertent computerized transcription errors may be present.   Electronically signed by Gina Mccallum MD on 2/6/2021 at 9:09 PM

## 2021-02-08 ENCOUNTER — APPOINTMENT (OUTPATIENT)
Dept: GENERAL RADIOLOGY | Age: 66
End: 2021-02-08
Payer: MEDICARE

## 2021-02-08 VITALS
BODY MASS INDEX: 24.12 KG/M2 | HEART RATE: 106 BPM | OXYGEN SATURATION: 96 % | SYSTOLIC BLOOD PRESSURE: 99 MMHG | TEMPERATURE: 98.7 F | DIASTOLIC BLOOD PRESSURE: 62 MMHG | RESPIRATION RATE: 16 BRPM | HEIGHT: 73 IN | WEIGHT: 182 LBS

## 2021-02-08 LAB
METER GLUCOSE: 124 MG/DL (ref 74–99)
METER GLUCOSE: 184 MG/DL (ref 74–99)
PROCALCITONIN: 0.35 NG/ML (ref 0–0.08)

## 2021-02-08 PROCEDURE — 84145 PROCALCITONIN (PCT): CPT

## 2021-02-08 PROCEDURE — 36415 COLL VENOUS BLD VENIPUNCTURE: CPT

## 2021-02-08 PROCEDURE — 99217 PR OBSERVATION CARE DISCHARGE MANAGEMENT: CPT | Performed by: INTERNAL MEDICINE

## 2021-02-08 PROCEDURE — G0378 HOSPITAL OBSERVATION PER HR: HCPCS

## 2021-02-08 PROCEDURE — 82962 GLUCOSE BLOOD TEST: CPT

## 2021-02-08 PROCEDURE — XW033E5 INTRODUCTION OF REMDESIVIR ANTI-INFECTIVE INTO PERIPHERAL VEIN, PERCUTANEOUS APPROACH, NEW TECHNOLOGY GROUP 5: ICD-10-PCS | Performed by: INTERNAL MEDICINE

## 2021-02-08 PROCEDURE — 6370000000 HC RX 637 (ALT 250 FOR IP): Performed by: INTERNAL MEDICINE

## 2021-02-08 PROCEDURE — 2580000003 HC RX 258: Performed by: INTERNAL MEDICINE

## 2021-02-08 PROCEDURE — 71045 X-RAY EXAM CHEST 1 VIEW: CPT

## 2021-02-08 PROCEDURE — 6360000002 HC RX W HCPCS: Performed by: INTERNAL MEDICINE

## 2021-02-08 PROCEDURE — 96372 THER/PROPH/DIAG INJ SC/IM: CPT

## 2021-02-08 RX ORDER — LEVOFLOXACIN 750 MG/1
750 TABLET ORAL DAILY
Qty: 5 TABLET | Refills: 0 | Status: ON HOLD
Start: 2021-02-08 | End: 2021-02-15 | Stop reason: HOSPADM

## 2021-02-08 RX ADMIN — Medication 2000 UNITS: at 08:38

## 2021-02-08 RX ADMIN — METOPROLOL SUCCINATE 50 MG: 25 TABLET, EXTENDED RELEASE ORAL at 08:39

## 2021-02-08 RX ADMIN — DOXAZOSIN 6 MG: 2 TABLET ORAL at 08:39

## 2021-02-08 RX ADMIN — ENOXAPARIN SODIUM 30 MG: 100 INJECTION SUBCUTANEOUS at 08:39

## 2021-02-08 RX ADMIN — INSULIN LISPRO 2 UNITS: 100 INJECTION, SOLUTION INTRAVENOUS; SUBCUTANEOUS at 12:10

## 2021-02-08 RX ADMIN — DOXYCYCLINE HYCLATE 100 MG: 100 CAPSULE ORAL at 08:39

## 2021-02-08 RX ADMIN — FERROUS SULFATE TAB 325 MG (65 MG ELEMENTAL FE) 325 MG: 325 (65 FE) TAB at 08:40

## 2021-02-08 RX ADMIN — BALSALAZIDE DISODIUM 750 MG: 750 CAPSULE ORAL at 08:40

## 2021-02-08 RX ADMIN — POTASSIUM CHLORIDE 40 MEQ: 20 TABLET, EXTENDED RELEASE ORAL at 08:40

## 2021-02-08 RX ADMIN — BALSALAZIDE DISODIUM 750 MG: 750 CAPSULE ORAL at 13:48

## 2021-02-08 RX ADMIN — Medication 10 ML: at 08:40

## 2021-02-08 RX ADMIN — DICYCLOMINE HYDROCHLORIDE 10 MG: 10 CAPSULE ORAL at 12:10

## 2021-02-08 RX ADMIN — ASPIRIN 81 MG: 81 TABLET, COATED ORAL at 08:40

## 2021-02-08 RX ADMIN — DICYCLOMINE HYDROCHLORIDE 10 MG: 10 CAPSULE ORAL at 06:06

## 2021-02-08 RX ADMIN — HYDROCHLOROTHIAZIDE 25 MG: 25 TABLET ORAL at 08:40

## 2021-02-08 RX ADMIN — PREDNISONE 35 MG: 20 TABLET ORAL at 08:39

## 2021-02-08 RX ADMIN — INSULIN GLARGINE 32 UNITS: 100 INJECTION, SOLUTION SUBCUTANEOUS at 06:43

## 2021-02-08 RX ADMIN — PRAVASTATIN SODIUM 20 MG: 20 TABLET ORAL at 08:40

## 2021-02-08 NOTE — PROGRESS NOTES
CLINICAL PHARMACY NOTE: MEDS TO [address] Select Patient?: No  Total # of Prescriptions Filled: 1   The following medications were delivered to the patient:  · Levofloxacin 750  Total # of Interventions Completed: 6  Time Spent (min): 45    Additional Documentation:

## 2021-02-08 NOTE — CARE COORDINATION
Introduced my self and provided explanation of CM role to patient. Patient is awake, alert, and aware of current diagnosis and treatment plan including discharge. Patient verbalizes no concerns and identifies no areas to focus on nor barriers to discharge. He indicates his wife will be here to pick him up. Patient is established with a pcp and denies any issue with retail pharmaceutical coverage. No post discharge needs identified. Brian Palafox.  Angie, MSN, RN  [facility] Case Management  [phone]

## 2021-02-08 NOTE — DISCHARGE SUMMARY
[facility] Physician Discharge Summary     No follow-up provider specified. Activity level   As tolerated    Disposition   Home      Condition on discharge Stable    Patient ID   Rock Resendiz, 77 y.o.male /  1955  / MRN [id number]    Admit date   2021    Discharge date  2021  12:05 PM    Admission diagnoses Active Problems:    COVID-19  Resolved Problems:    * No resolved hospital problems. *    Discharge diagnoses Same    Consults   IP CONSULT TO HOSPITALIST    Procedures   See hospital course    Hospital Course  65M PMH IDDM, HPL, HTN, ulcerative colitis admitted evening of  after being found positive for COVID as well as likely CAP w LLL infiltrate on CXR. Fatigued next day w cough though no fevers or hypoxia; as no hypoxia, remdesivir was not started. W pt feeling a bit worse, was kept pt overnight again for monitoring. Still fatigued w cough but no chest pain, fevers, chills, emesis. Does have chronic intermittent diarrhea due to ulcerative colitis. At this time pt not feeling great but vitals stable and he is not requiring O2. Discussed dc w both pt and wife over the phone, emphasized several times that if he should decompensate to please not take unnecessary risks and come back for re-evaluation. Will send on PO Levaquin for pna on CXR. Pt already on prednisone for UC.       Discharge Exam  BP 99/62   Pulse 106   Temp 98.7 °F (37.1 °C) (Oral)   Resp 16   Ht 6' 1\" (1.854 m)   Wt 182 lb (82.6 kg)   SpO2 96%   BMI 24.01 kg/m²   General Appearance: alert and oriented to person, place and time and in no acute distress  Skin: warm and dry  Head: normocephalic and atraumatic  Eyes: pupils equal, round, and reactive to light, extraocular eye movements intact, conjunctivae normal  Neck: neck supple and non tender without mass   Pulmonary/Chest: clear to auscultation bilaterally- no wheezes, rales or rhonchi, normal air movement, no respiratory tablet  Commonly known as: TOPROL XL     NovoLOG Mix 70/30 FlexPen (70-30) 100 UNIT/ML injection  Generic drug: insulin aspart protamine-insulin aspart     * POTASSIUM CHLORIDE PO     * POTASSIUM CHLORIDE PO     pravastatin 20 MG tablet  Commonly known as: PRAVACHOL     predniSONE 10 MG tablet  Commonly known as: DELTASONE     * terazosin 2 MG capsule  Commonly known as: HYTRIN     * terazosin 2 MG capsule  Commonly known as: HYTRIN     Turmeric 500 MG Caps     VITAMIN D PO     ZINC OXIDE (TOPICAL) 10 % Crea         * This list has 4 medication(s) that are the same as other medications prescribed for you. Read the directions carefully, and ask your doctor or other care provider to review them with you.                Where to Get Your Medications      These medications were sent to [facility] Efren Knife [phone]  [facility] [address], [address] [place] [address]    Phone: [phone]   · levoFLOXacin 750 MG tablet       Note that more than 30 minutes was spent in preparing discharge papers, discussing discharge with patient, medication review, etc.    Electronically signed by eFliciano Blanton DO on 2/8/2021 at 12:05 PM

## 2021-02-09 ENCOUNTER — CARE COORDINATION (OUTPATIENT)
Dept: CASE MANAGEMENT | Age: 66
End: 2021-02-09

## 2021-02-09 NOTE — CARE COORDINATION
Covid-19 Initial Outreach call, no answer.   Left VM with contact information and request  for return call at [address], [address] [place] [address] Coordination Transition

## 2021-02-10 ENCOUNTER — CARE COORDINATION (OUTPATIENT)
Dept: CASE MANAGEMENT | Age: 66
End: 2021-02-10

## 2021-02-11 ENCOUNTER — APPOINTMENT (OUTPATIENT)
Dept: CT IMAGING | Age: 66
DRG: 871 | End: 2021-02-11
Payer: MEDICARE

## 2021-02-11 ENCOUNTER — HOSPITAL ENCOUNTER (INPATIENT)
Age: 66
LOS: 4 days | Discharge: HOME | DRG: 871 | End: 2021-02-15
Attending: EMERGENCY MEDICINE | Admitting: INTERNAL MEDICINE
Payer: MEDICARE

## 2021-02-11 DIAGNOSIS — U07.1 ACUTE RESPIRATORY FAILURE DUE TO COVID-19 (HCC): Primary | ICD-10-CM

## 2021-02-11 DIAGNOSIS — J96.00 ACUTE RESPIRATORY FAILURE DUE TO COVID-19 (HCC): Primary | ICD-10-CM

## 2021-02-11 LAB
ALBUMIN SERPL-MCNC: 2.9 G/DL (ref 3.5–5.2)
ALP BLD-CCNC: 49 U/L (ref 40–129)
ALT SERPL-CCNC: 7 U/L (ref 0–40)
ANION GAP SERPL CALCULATED.3IONS-SCNC: 9 MMOL/L (ref 7–16)
APTT: 25.2 SEC (ref 24.5–35.1)
AST SERPL-CCNC: 16 U/L (ref 0–39)
BASOPHILS ABSOLUTE: 0 E9/L (ref 0–0.2)
BASOPHILS RELATIVE PERCENT: 0 % (ref 0–2)
BILIRUB SERPL-MCNC: 0.4 MG/DL (ref 0–1.2)
BLOOD CULTURE, ROUTINE: NORMAL
BUN BLDV-MCNC: 21 MG/DL (ref 8–23)
CALCIUM SERPL-MCNC: 7.9 MG/DL (ref 8.6–10.2)
CHLORIDE BLD-SCNC: 100 MMOL/L (ref 98–107)
CO2: 29 MMOL/L (ref 22–29)
CREAT SERPL-MCNC: 1 MG/DL (ref 0.7–1.2)
CULTURE, BLOOD 2: NORMAL
EKG ATRIAL RATE: 141 BPM
EKG Q-T INTERVAL: 328 MS
EKG QRS DURATION: 98 MS
EKG QTC CALCULATION (BAZETT): 449 MS
EKG R AXIS: 55 DEGREES
EKG T AXIS: -29 DEGREES
EKG VENTRICULAR RATE: 113 BPM
EOSINOPHILS ABSOLUTE: 0 E9/L (ref 0.05–0.5)
EOSINOPHILS RELATIVE PERCENT: 0 % (ref 0–6)
GFR AFRICAN AMERICAN: >60
GFR NON-AFRICAN AMERICAN: >60 ML/MIN/1.73
GLUCOSE BLD-MCNC: 118 MG/DL (ref 74–99)
HCT VFR BLD CALC: 29.8 % (ref 37–54)
HEMOGLOBIN: 9.7 G/DL (ref 12.5–16.5)
IMMATURE GRANULOCYTES #: 0.05 E9/L
IMMATURE GRANULOCYTES %: 0.9 % (ref 0–5)
INR BLD: 1.4
LACTIC ACID: 1.5 MMOL/L (ref 0.5–2.2)
LYMPHOCYTES ABSOLUTE: 0.14 E9/L (ref 1.5–4)
LYMPHOCYTES RELATIVE PERCENT: 2.5 % (ref 20–42)
MCH RBC QN AUTO: 28.3 PG (ref 26–35)
MCHC RBC AUTO-ENTMCNC: 32.6 % (ref 32–34.5)
MCV RBC AUTO: 86.9 FL (ref 80–99.9)
METER GLUCOSE: 242 MG/DL (ref 74–99)
MONOCYTES ABSOLUTE: 0.08 E9/L (ref 0.1–0.95)
MONOCYTES RELATIVE PERCENT: 1.4 % (ref 2–12)
NEUTROPHILS ABSOLUTE: 5.42 E9/L (ref 1.8–7.3)
NEUTROPHILS RELATIVE PERCENT: 95.2 % (ref 43–80)
PDW BLD-RTO: 14.2 FL (ref 11.5–15)
PLATELET # BLD: 92 E9/L (ref 130–450)
PLATELET CONFIRMATION: NORMAL
PMV BLD AUTO: 8.9 FL (ref 7–12)
POTASSIUM SERPL-SCNC: 3.4 MMOL/L (ref 3.5–5)
PROTHROMBIN TIME: 16.3 SEC (ref 9.3–12.4)
RBC # BLD: 3.43 E12/L (ref 3.8–5.8)
RBC # BLD: NORMAL 10*6/UL
SODIUM BLD-SCNC: 138 MMOL/L (ref 132–146)
TOTAL PROTEIN: 5.8 G/DL (ref 6.4–8.3)
TROPONIN: <0.01 NG/ML (ref 0–0.03)
WBC # BLD: 5.7 E9/L (ref 4.5–11.5)

## 2021-02-11 PROCEDURE — 6360000002 HC RX W HCPCS: Performed by: STUDENT IN AN ORGANIZED HEALTH CARE EDUCATION/TRAINING PROGRAM

## 2021-02-11 PROCEDURE — 83605 ASSAY OF LACTIC ACID: CPT

## 2021-02-11 PROCEDURE — 6370000000 HC RX 637 (ALT 250 FOR IP): Performed by: STUDENT IN AN ORGANIZED HEALTH CARE EDUCATION/TRAINING PROGRAM

## 2021-02-11 PROCEDURE — 2580000003 HC RX 258: Performed by: STUDENT IN AN ORGANIZED HEALTH CARE EDUCATION/TRAINING PROGRAM

## 2021-02-11 PROCEDURE — 99223 1ST HOSP IP/OBS HIGH 75: CPT | Performed by: INTERNAL MEDICINE

## 2021-02-11 PROCEDURE — 80053 COMPREHEN METABOLIC PANEL: CPT

## 2021-02-11 PROCEDURE — 6360000004 HC RX CONTRAST MEDICATION: Performed by: RADIOLOGY

## 2021-02-11 PROCEDURE — 99285 EMERGENCY DEPT VISIT HI MDM: CPT

## 2021-02-11 PROCEDURE — 2700000000 HC OXYGEN THERAPY PER DAY

## 2021-02-11 PROCEDURE — 85610 PROTHROMBIN TIME: CPT

## 2021-02-11 PROCEDURE — 84484 ASSAY OF TROPONIN QUANT: CPT

## 2021-02-11 PROCEDURE — 85730 THROMBOPLASTIN TIME PARTIAL: CPT

## 2021-02-11 PROCEDURE — 2580000003 HC RX 258: Performed by: RADIOLOGY

## 2021-02-11 PROCEDURE — 85025 COMPLETE CBC W/AUTO DIFF WBC: CPT

## 2021-02-11 PROCEDURE — 2500000003 HC RX 250 WO HCPCS: Performed by: INTERNAL MEDICINE

## 2021-02-11 PROCEDURE — 71275 CT ANGIOGRAPHY CHEST: CPT

## 2021-02-11 PROCEDURE — 2580000003 HC RX 258: Performed by: INTERNAL MEDICINE

## 2021-02-11 PROCEDURE — 93005 ELECTROCARDIOGRAM TRACING: CPT | Performed by: EMERGENCY MEDICINE

## 2021-02-11 PROCEDURE — 1200000000 HC SEMI PRIVATE

## 2021-02-11 PROCEDURE — 6370000000 HC RX 637 (ALT 250 FOR IP): Performed by: INTERNAL MEDICINE

## 2021-02-11 PROCEDURE — 82962 GLUCOSE BLOOD TEST: CPT

## 2021-02-11 RX ORDER — AMLODIPINE BESYLATE 10 MG/1
10 TABLET ORAL DAILY
Status: DISCONTINUED | OUTPATIENT
Start: 2021-02-11 | End: 2021-02-15 | Stop reason: HOSPADM

## 2021-02-11 RX ORDER — ASPIRIN 81 MG/1
81 TABLET ORAL DAILY
Status: DISCONTINUED | OUTPATIENT
Start: 2021-02-11 | End: 2021-02-15 | Stop reason: HOSPADM

## 2021-02-11 RX ORDER — ACETAMINOPHEN 325 MG/1
650 TABLET ORAL EVERY 6 HOURS PRN
Status: DISCONTINUED | OUTPATIENT
Start: 2021-02-11 | End: 2021-02-15 | Stop reason: HOSPADM

## 2021-02-11 RX ORDER — LISINOPRIL 20 MG/1
20 TABLET ORAL DAILY
Status: DISCONTINUED | OUTPATIENT
Start: 2021-02-11 | End: 2021-02-15 | Stop reason: HOSPADM

## 2021-02-11 RX ORDER — HYDROCHLOROTHIAZIDE 25 MG/1
25 TABLET ORAL DAILY
Status: DISCONTINUED | OUTPATIENT
Start: 2021-02-11 | End: 2021-02-15 | Stop reason: HOSPADM

## 2021-02-11 RX ORDER — ONDANSETRON 2 MG/ML
4 INJECTION INTRAMUSCULAR; INTRAVENOUS EVERY 6 HOURS PRN
Status: DISCONTINUED | OUTPATIENT
Start: 2021-02-11 | End: 2021-02-15 | Stop reason: HOSPADM

## 2021-02-11 RX ORDER — POTASSIUM CHLORIDE 20 MEQ/1
40 TABLET, EXTENDED RELEASE ORAL PRN
Status: DISCONTINUED | OUTPATIENT
Start: 2021-02-11 | End: 2021-02-13

## 2021-02-11 RX ORDER — MESALAMINE 1000 MG/1
1000 SUPPOSITORY RECTAL NIGHTLY
Status: DISCONTINUED | OUTPATIENT
Start: 2021-02-11 | End: 2021-02-15 | Stop reason: HOSPADM

## 2021-02-11 RX ORDER — ACETAMINOPHEN 325 MG/1
650 TABLET ORAL ONCE
Status: COMPLETED | OUTPATIENT
Start: 2021-02-11 | End: 2021-02-11

## 2021-02-11 RX ORDER — PROMETHAZINE HYDROCHLORIDE 25 MG/1
12.5 TABLET ORAL EVERY 6 HOURS PRN
Status: DISCONTINUED | OUTPATIENT
Start: 2021-02-11 | End: 2021-02-15 | Stop reason: HOSPADM

## 2021-02-11 RX ORDER — SODIUM CHLORIDE 0.9 % (FLUSH) 0.9 %
10 SYRINGE (ML) INJECTION PRN
Status: DISCONTINUED | OUTPATIENT
Start: 2021-02-11 | End: 2021-02-15 | Stop reason: HOSPADM

## 2021-02-11 RX ORDER — 0.9 % SODIUM CHLORIDE 0.9 %
500 INTRAVENOUS SOLUTION INTRAVENOUS ONCE
Status: COMPLETED | OUTPATIENT
Start: 2021-02-11 | End: 2021-02-11

## 2021-02-11 RX ORDER — DICYCLOMINE HYDROCHLORIDE 10 MG/1
10 CAPSULE ORAL
Status: DISCONTINUED | OUTPATIENT
Start: 2021-02-11 | End: 2021-02-15 | Stop reason: HOSPADM

## 2021-02-11 RX ORDER — ACETAMINOPHEN 650 MG/1
650 SUPPOSITORY RECTAL EVERY 6 HOURS PRN
Status: DISCONTINUED | OUTPATIENT
Start: 2021-02-11 | End: 2021-02-15 | Stop reason: HOSPADM

## 2021-02-11 RX ORDER — DOXAZOSIN MESYLATE 4 MG/1
4 TABLET ORAL EVERY 12 HOURS SCHEDULED
Status: DISCONTINUED | OUTPATIENT
Start: 2021-02-11 | End: 2021-02-15 | Stop reason: HOSPADM

## 2021-02-11 RX ORDER — DEXTROSE MONOHYDRATE 50 MG/ML
100 INJECTION, SOLUTION INTRAVENOUS PRN
Status: DISCONTINUED | OUTPATIENT
Start: 2021-02-11 | End: 2021-02-15 | Stop reason: HOSPADM

## 2021-02-11 RX ORDER — AMLODIPINE BESYLATE AND BENAZEPRIL HYDROCHLORIDE 10; 20 MG/1; MG/1
1 CAPSULE ORAL DAILY
Status: DISCONTINUED | OUTPATIENT
Start: 2021-02-11 | End: 2021-02-11 | Stop reason: CLARIF

## 2021-02-11 RX ORDER — BALSALAZIDE DISODIUM 750 MG/1
750 CAPSULE ORAL 3 TIMES DAILY
Status: DISCONTINUED | OUTPATIENT
Start: 2021-02-11 | End: 2021-02-15 | Stop reason: HOSPADM

## 2021-02-11 RX ORDER — METOPROLOL SUCCINATE 50 MG/1
50 TABLET, EXTENDED RELEASE ORAL DAILY
Status: DISCONTINUED | OUTPATIENT
Start: 2021-02-11 | End: 2021-02-15 | Stop reason: HOSPADM

## 2021-02-11 RX ORDER — SODIUM CHLORIDE 0.9 % (FLUSH) 0.9 %
10 SYRINGE (ML) INJECTION EVERY 12 HOURS SCHEDULED
Status: DISCONTINUED | OUTPATIENT
Start: 2021-02-11 | End: 2021-02-15 | Stop reason: HOSPADM

## 2021-02-11 RX ORDER — DEXAMETHASONE 4 MG/1
6 TABLET ORAL ONCE
Status: DISCONTINUED | OUTPATIENT
Start: 2021-02-11 | End: 2021-02-11

## 2021-02-11 RX ORDER — 0.9 % SODIUM CHLORIDE 0.9 %
30 INTRAVENOUS SOLUTION INTRAVENOUS PRN
Status: DISCONTINUED | OUTPATIENT
Start: 2021-02-11 | End: 2021-02-15 | Stop reason: HOSPADM

## 2021-02-11 RX ORDER — NICOTINE POLACRILEX 4 MG
15 LOZENGE BUCCAL PRN
Status: DISCONTINUED | OUTPATIENT
Start: 2021-02-11 | End: 2021-02-15 | Stop reason: HOSPADM

## 2021-02-11 RX ORDER — POTASSIUM CHLORIDE 7.45 MG/ML
10 INJECTION INTRAVENOUS PRN
Status: DISCONTINUED | OUTPATIENT
Start: 2021-02-11 | End: 2021-02-13

## 2021-02-11 RX ORDER — POLYETHYLENE GLYCOL 3350 17 G/17G
17 POWDER, FOR SOLUTION ORAL DAILY PRN
Status: DISCONTINUED | OUTPATIENT
Start: 2021-02-11 | End: 2021-02-15 | Stop reason: HOSPADM

## 2021-02-11 RX ORDER — INSULIN GLARGINE 100 [IU]/ML
20 INJECTION, SOLUTION SUBCUTANEOUS EVERY MORNING
Status: DISCONTINUED | OUTPATIENT
Start: 2021-02-12 | End: 2021-02-15 | Stop reason: HOSPADM

## 2021-02-11 RX ORDER — PRAVASTATIN SODIUM 20 MG
20 TABLET ORAL NIGHTLY
Status: DISCONTINUED | OUTPATIENT
Start: 2021-02-11 | End: 2021-02-15 | Stop reason: HOSPADM

## 2021-02-11 RX ORDER — DEXTROSE MONOHYDRATE 25 G/50ML
12.5 INJECTION, SOLUTION INTRAVENOUS PRN
Status: DISCONTINUED | OUTPATIENT
Start: 2021-02-11 | End: 2021-02-15 | Stop reason: HOSPADM

## 2021-02-11 RX ORDER — SODIUM CHLORIDE 0.9 % (FLUSH) 0.9 %
10 SYRINGE (ML) INJECTION PRN
Status: COMPLETED | OUTPATIENT
Start: 2021-02-11 | End: 2021-02-11

## 2021-02-11 RX ADMIN — INSULIN LISPRO 2 UNITS: 100 INJECTION, SOLUTION INTRAVENOUS; SUBCUTANEOUS at 23:16

## 2021-02-11 RX ADMIN — Medication 10 ML: at 22:22

## 2021-02-11 RX ADMIN — AMLODIPINE BESYLATE 10 MG: 10 TABLET ORAL at 22:14

## 2021-02-11 RX ADMIN — Medication 10 ML: at 15:16

## 2021-02-11 RX ADMIN — REMDESIVIR 200 MG: 100 INJECTION, POWDER, LYOPHILIZED, FOR SOLUTION INTRAVENOUS at 22:26

## 2021-02-11 RX ADMIN — IOPAMIDOL 75 ML: 755 INJECTION, SOLUTION INTRAVENOUS at 15:18

## 2021-02-11 RX ADMIN — ASPIRIN 81 MG: 81 TABLET, COATED ORAL at 22:13

## 2021-02-11 RX ADMIN — DOXAZOSIN 4 MG: 4 TABLET ORAL at 22:13

## 2021-02-11 RX ADMIN — DICYCLOMINE HYDROCHLORIDE 10 MG: 10 CAPSULE ORAL at 22:13

## 2021-02-11 RX ADMIN — LISINOPRIL 20 MG: 20 TABLET ORAL at 22:21

## 2021-02-11 RX ADMIN — DEXAMETHASONE 6 MG: 4 TABLET ORAL at 17:38

## 2021-02-11 RX ADMIN — ACETAMINOPHEN 650 MG: 325 TABLET ORAL at 16:26

## 2021-02-11 RX ADMIN — HYDROCHLOROTHIAZIDE 25 MG: 25 TABLET ORAL at 22:37

## 2021-02-11 RX ADMIN — BALSALAZIDE DISODIUM 750 MG: 750 CAPSULE ORAL at 22:21

## 2021-02-11 RX ADMIN — METOPROLOL SUCCINATE 50 MG: 50 TABLET, FILM COATED, EXTENDED RELEASE ORAL at 22:22

## 2021-02-11 RX ADMIN — SODIUM CHLORIDE 500 ML: 9 INJECTION, SOLUTION INTRAVENOUS at 17:38

## 2021-02-11 RX ADMIN — PRAVASTATIN SODIUM 20 MG: 20 TABLET ORAL at 22:22

## 2021-02-11 ASSESSMENT — ENCOUNTER SYMPTOMS
COUGH: 1
SHORTNESS OF BREATH: 0
BACK PAIN: 0
ABDOMINAL PAIN: 0
VOMITING: 0
NAUSEA: 0
SORE THROAT: 0
DIARRHEA: 0
RHINORRHEA: 0

## 2021-02-11 ASSESSMENT — PAIN SCALES - GENERAL
PAINLEVEL_OUTOF10: 0

## 2021-02-11 NOTE — PROGRESS NOTES
Database complete. Medications reconciled. Care plans and education initiated. Cardiologist is Dr. Maylin Spangler. Nephrologist is Dr. Maxi Sullivan. Endocrinologist is Dr. Sravanthi Lopez.

## 2021-02-11 NOTE — CARE COORDINATION
Advance Care Planning     Advance Care Planning Activator (Inpatient)  Conversation Note      Date of ACP Conversation: 2/11/2021    Conversation Conducted with: Patient with Decision Making Capacity    ACP Activator: abdullahiibis colemanemily        Health Care Decision Maker:     Current Designated Health Care Decision Maker:     Primary Decision Maker: Fernanda Chacko - Spouse - [phone]  Click here to complete Healthcare Decision Makers including section of the Healthcare Decision Maker Relationship (ie \"Primary\")  Today we documented Decision Maker(s). The patient will provide ACP documents. Care Preferences    Ventilation: \"If you were in your present state of health and suddenly became very ill and were unable to breathe on your own, what would your preference be about the use of a ventilator (breathing machine) if it were available to you? \"      Would the patient desire the use of ventilator (breathing machine)?: yes    \"If your health worsens and it becomes clear that your chance of recovery is unlikely, what would your preference be about the use of a ventilator (breathing machine) if it were available to you? \"     Would the patient desire the use of ventilator (breathing machine)?: Yes      Resuscitation  \"CPR works best to restart the heart when there is a sudden event, like a heart attack, in someone who is otherwise healthy. Unfortunately, CPR does not typically restart the heart for people who have serious health conditions or who are very sick. \"    \"In the event your heart stopped as a result of an underlying serious health condition, would you want attempts to be made to restart your heart (answer \"yes\" for attempt to resuscitate) or would you prefer a natural death (answer \"no\" for do not attempt to resuscitate)? \" yes       [x] Yes   [] No   Educated Patient / Selam Ross regarding differences between Advance Directives and portable DNR orders.     Length of ACP Conversation in minutes: 10  Conversation Outcomes:  [x] ACP discussion completed  [x] Existing advance directive reviewed with patient; no changes to patient's previously recorded wishes  [] New Advance Directive completed  [] Portable Do Not Rescitate prepared for Provider review and signature  [] POLST/POST/MOLST/MOST prepared for Provider review and signature      Follow-up plan:    [] Schedule follow-up conversation to continue planning  [x] Referred individual to Provider for additional questions/concerns   [] Advised patient/agent/surrogate to review completed ACP document and update if needed with changes in condition, patient preferences or care setting    [] This note routed to one or more involved healthcare providers    Pt reported he has a DPOA and LW at home, SW requested pt bring a copy to the Hospital or educated pt on going to my chart and placing a copy onto the [address].

## 2021-02-11 NOTE — H&P
[facility] History and Physical      CHIEF COMPLAINT: Dyspnea    History of Present Illness: Patient is a 57-year-old male with past medical history significant for ulcerative colitis, diagnosed with COVID-19 6 days ago. He presented to the emergency department with complaints of worsening dyspnea. He states it has been constant for the past few days, and gets worse with ambulation. He is also reporting loss of sense of taste and smell, as well as with generalized weakness, and generalized malaise. Initially, he was saturating normally on room air, but ultimately became hypoxic with ambulation and was placed on supplemental oxygen. ED work-up was significant for CT of the chest showing bilateral infiltrates suggesting COVID-19 pneumonia. Medicine was asked to admit for acute hypoxic respiratory failure secondary to COVID-19 pneumonia. REVIEW OF SYSTEMS:  A comprehensive review of systems was negative except for: what is in the HPI    PMH:  Past Medical History:   Diagnosis Date    Coronary atherosclerosis of native coronary artery     [facility] mellitus ([facility]     Hyperlipidemia     Hypertension     Ulcerative colitis ([facility]        Surgical History:  Past Surgical History:   Procedure Laterality Date    DIAGNOSTIC CARDIAC CATH LAB PROCEDURE      SIGMOIDOSCOPY         Medications Prior to Admission:    Prior to Admission medications    Medication Sig Start Date End Date Taking?  Authorizing Provider   levoFLOXacin (LEVAQUIN) 750 MG tablet Take 1 tablet by mouth daily for 5 days 2/8/21 2/13/21 Yes Duran Andrews,    terazosin (HYTRIN) 2 MG capsule Take 7 mg by mouth every morning   Yes Historical Provider, MD   POTASSIUM CHLORIDE PO Take 40 mEq by mouth every morning   Yes Historical Provider, MD   POTASSIUM CHLORIDE PO Take 20 mEq by mouth nightly   Yes Historical Provider, MD   Turmeric 500 MG CAPS Take 500 mg by mouth every morning    Yes Historical Provider, MD   predniSONE (DELTASONE) 10 MG tablet Take 35 mg by mouth See Admin Instructions 35mg daily for 7 days  then on 2/13/21 start 30mg for 7 days then   25mg for 7 days then  20mg daily barbie 7 days then  15mg daily for 7 days then  10mg for 7 days then  5mg for 7 days then  2.5mg for 7 days done. Pt states started at [facility]. Yes Historical Provider, MD   dicyclomine (BENTYL) 10 MG capsule Take 10 mg by mouth 4 times daily (before meals and nightly)   Yes Historical Provider, MD   CANASA 1000 MG suppository unwrap and insert 1 suppository rectally at bedtime 3/18/18  Yes Historical Provider, MD   metoprolol succinate (TOPROL XL) 50 MG extended release tablet Take 50 mg by mouth daily  4/7/17  Yes Historical Provider, MD   insulin aspart protamine-insulin aspart (NOVOLOG MIX 70/30 FLEXPEN) (70-30) 100 UNIT/ML injection Inject into the skin as needed Sliding scale as needed    Yes Historical Provider, MD   amlodipine-benazepril (LOTREL) 10-20 MG per capsule Take 1 capsule by mouth daily. Yes Historical Provider, MD   terazosin (HYTRIN) 2 MG capsule Take 5 mg by mouth nightly    Yes Historical Provider, MD   hydrochlorothiazide (HYDRODIURIL) 25 MG tablet Take 25 mg by mouth daily. Yes Historical Provider, MD   pravastatin (PRAVACHOL) 20 MG tablet Take 20 mg by mouth nightly    Yes Historical Provider, MD   aspirin 81 MG EC tablet Take 81 mg by mouth daily. Yes Historical Provider, MD   balsalazide (COLAZAL) 750 MG capsule Take 750 mg by mouth 3 times daily. Yes Historical Provider, MD   Testosterone (ANDROGEL TD) Place  onto the skin daily.  pump   Yes Historical Provider, MD   Cholecalciferol (VITAMIN D PO) Take 2,000 Units by mouth Daily with lunch    Yes Historical Provider, MD   insulin glargine (LANTUS) 100 UNIT/ML injection Inject 32 Units into the skin every morning    Yes Historical Provider, MD   ZINC OXIDE, TOPICAL, 10 % CREA Apply topically 4 times daily as needed 6/15/20   Historical Provider, MD CIALIS 20 MG tablet Take 20 mg by mouth as needed  3/2/17   Historical Provider, MD       Allergies:    Sulfa antibiotics    Social History:    reports that he quit smoking about 39 years ago. His smoking use included cigarettes. He started smoking about 49 years ago. He smoked 1.00 pack per day. He has never used smokeless tobacco. He reports that he does not drink alcohol or use drugs. Family History:   family history includes Cancer in his mother. PHYSICAL EXAM:  Vitals:  /79   Pulse 113   Temp 98.8 °F (37.1 °C) (Oral)   Resp 24   Ht 6' 1\" (1.854 m)   Wt 179 lb (81.2 kg)   SpO2 90%   BMI 23.62 kg/m²     General Appearance: alert and oriented to person, place and time and in no acute distress  Skin: warm and dry  Head: normocephalic and atraumatic  Eyes: pupils equal, round, and reactive to light, extraocular eye movements intact, conjunctivae normal  Neck: neck supple and non tender without mass   Pulmonary/Chest: clear to auscultation bilaterally- no wheezes, rales or rhonchi, normal air movement, no respiratory distress  Cardiovascular: normal rate, normal S1 and S2 and no carotid bruits  Abdomen: soft, non-tender, non-distended, normal bowel sounds, no masses or organomegaly  Extremities: no cyanosis, no clubbing and no edema  Neurologic: no cranial nerve deficit and speech normal        LABS:  Recent Labs     02/11/21  1341      K 3.4*      CO2 29   BUN 21   CREATININE 1.0   GLUCOSE 118*   CALCIUM 7.9*       Recent Labs     02/11/21  1341   WBC 5.7   RBC 3.43*   HGB 9.7*   HCT 29.8*   MCV 86.9   MCH 28.3   MCHC 32.6   RDW 14.2   PLT 92*   MPV 8.9       No results for input(s): POCGLU in the last 72 hours.     CBC:   Lab Results   Component Value Date    WBC 5.7 02/11/2021    RBC 3.43 02/11/2021    HGB 9.7 02/11/2021    HCT 29.8 02/11/2021    MCV 86.9 02/11/2021    MCH 28.3 02/11/2021    MCHC 32.6 02/11/2021    RDW 14.2 02/11/2021    PLT 92 02/11/2021    MPV 8.9 02/11/2021 CMP:    Lab Results   Component Value Date     02/11/2021    K 3.4 02/11/2021    K 4.3 02/07/2021     02/11/2021    CO2 29 02/11/2021    BUN 21 02/11/2021    CREATININE 1.0 02/11/2021    GFRAA >60 02/11/2021    LABGLOM >60 02/11/2021    GLUCOSE 118 02/11/2021    GLUCOSE 119 12/30/2010    PROT 5.8 02/11/2021    LABALBU 2.9 02/11/2021    CALCIUM 7.9 02/11/2021    BILITOT 0.4 02/11/2021    ALKPHOS 49 02/11/2021    AST 16 02/11/2021    ALT 7 02/11/2021       Radiology:   CTA PULMONARY W CONTRAST   Final Result   1. No pulmonary embolism. 2.  Patchy ground-glass opacities throughout both lungs suggests bilateral   pneumonia with mixed areas of subsegmental atelectasis. 3.  Minimal bilateral pleural effusions. 4.  Small pericardial effusion. 5.  View of upper abdomen shows splenomegaly. ASSESSMENT:      Active Problems:    COVID-19  Resolved Problems:    * No resolved hospital problems. *      PLAN:    Acute hypoxic respiratory failure  COVID-19 pneumonia  Covid swab positive on 2/6/2021, but was not hypoxic. He was, however, admitted, because there was concern for superimposed pneumonia, and he was treated with antibiotics and then discharged. He is now presenting with worsening dyspnea, exacerbated by exertion. Is now mildly hypoxic with ambulation. He has bilateral infiltrates on a CTA of the chest, but no PE.   Medicine was asked to admit for acute hypoxic respiratory failure secondary to Covid pneumonia.  -Admit to medical unit  -Supplemental oxygen, wean as tolerated  -Remdesivir  -Decadron daily    Persistent atrial fibrillation  Per cardiology office visit on October 1, 2020, anticoagulation is contraindicated due to repeated history of GI bleeding  -monitor    Ulcerative colitis  -Continue current regimen per most recent GI recs    Insulin-dependent diabetes  -Lantus and sliding scale    Hypertension  -Continue home medications    Code Status: Full  DVT prophylaxis: SCDs      NOTE: This report was transcribed using voice recognition software. Every effort was made to ensure accuracy; however, inadvertent computerized transcription errors may be present.   Electronically signed by Tammi Bucio DO on 2/11/2021 at 4:50 PM

## 2021-02-11 NOTE — ED PROVIDER NOTES
Lis Maldonado is a 77 y.o. male with a PMHx significant for CAD, HTN, HLD, recent COVID diagnosis who presents for evaluation of shortness of breath, beginning moderatearrival.  The complaint has been constant, moderate in severity, and worsened by ambulation. The patient states that he tested positive for Covid on February 2 after he was having cough and congestion. He notes he had a 2-day hospital stay and then was discharged back home. Notes worsening symptoms while at home with more shortness of breath, loss of sense of taste and smell, fatigue and weakness. Patient denies any dizziness, headedness, chest pain, nausea, vomiting. Upon medical record review the patient was not hypoxic during his prior stay and he did not receive and as needed at that point time. The history is provided by the patient and medical records. Review of Systems   Constitutional: Positive for fatigue and fever. Negative for chills and diaphoresis. HENT: Positive for congestion. Negative for rhinorrhea and sore throat. Eyes: Negative for visual disturbance. Respiratory: Positive for cough. Negative for shortness of breath. Cardiovascular: Negative for chest pain. Gastrointestinal: Negative for abdominal pain, diarrhea, nausea and vomiting. Genitourinary: Negative for dysuria and urgency. Musculoskeletal: Negative for back pain. Skin: Negative for rash. Neurological: Negative for dizziness, light-headedness, numbness and headaches. Physical Exam  Vitals signs and nursing note reviewed. Constitutional:       General: He is not in acute distress. Appearance: He is well-developed. He is not ill-appearing. HENT:      Head: Normocephalic and atraumatic. Right Ear: External ear normal.      Left Ear: External ear normal.   Eyes:      General:         Right eye: No discharge. Left eye: No discharge. Extraocular Movements: Extraocular movements intact.       Conjunctiva/sclera: Conjunctivae normal.   Neck:      Musculoskeletal: Normal range of motion and neck supple. Cardiovascular:      Rate and Rhythm: Normal rate and regular rhythm. Heart sounds: Normal heart sounds. No murmur. Pulmonary:      Effort: Pulmonary effort is normal. No respiratory distress. Breath sounds: No stridor. Examination of the right-lower field reveals rhonchi. Examination of the left-lower field reveals rhonchi. Rhonchi present. No wheezing. Abdominal:      General: There is no distension. Palpations: Abdomen is soft. There is no mass. Tenderness: There is no abdominal tenderness. Hernia: No hernia is present. Skin:     General: Skin is warm and dry. Coloration: Skin is not jaundiced or pale. Neurological:      General: No focal deficit present. Mental Status: He is alert and oriented to person, place, and time. Procedures     [facility]     ED Course as of Feb 11 2246   Thu Feb 11, 2021   1340 EKG: This EKG is signed and interpreted by me. Rate: 113  Rhythm: Atrial fibrillation  Interpretation: A. fib RVR, no ST elevations, no ST depressions, QRS of 98, QTC of 449  Comparison: changes compared to previous EKG 02/06-21      [BB]      ED Course User Index  [BB] DO Leonides Forrest Cera presents to the ED for evaluation of shortness of breath. Patient recently tested positive for COVID and had been admitted. Patient was not hypoxic at that time and therefore did not receive remdesivir. Patient is hypoxic on room air at 88% in the department. Workup in the ED revealed negative CTA for acute PE but there is a small pericardial effusion, minimal bilateral pleural effusions and groundglass opacities throughout. Patient was given supplemental oxygen, Decadron, Tylenol and IV fluids for their symptoms.  Patient requires continued workup and management of their symptoms and will be admitted to the hospital for further evaluation and treatment.      --------------------------------------------- PAST HISTORY ---------------------------------------------  Past Medical History:  has a past medical history of Coronary atherosclerosis of native coronary artery, Diabetes mellitus ([facility] Hyperlipidemia, Hypertension, and Ulcerative colitis ([facility] Past Surgical History:  has a past surgical history that includes Diagnostic Cardiac Cath Lab Procedure and sigmoidoscopy. Social History:  reports that he quit smoking about 39 years ago. His smoking use included cigarettes. He started smoking about 49 years ago. He smoked 1.00 pack per day. He has never used smokeless tobacco. He reports that he does not drink alcohol or use drugs. Family History: family history includes Cancer in his mother. The patients home medications have been reviewed.     Allergies: Sulfa antibiotics    -------------------------------------------------- RESULTS -------------------------------------------------    LABS:  Results for orders placed or performed during the hospital encounter of 02/11/21   CBC auto differential   Result Value Ref Range    WBC 5.7 4.5 - 11.5 E9/L    RBC 3.43 (L) 3.80 - 5.80 E12/L    Hemoglobin 9.7 (L) 12.5 - 16.5 g/dL    Hematocrit 29.8 (L) 37.0 - 54.0 %    MCV 86.9 80.0 - 99.9 fL    MCH 28.3 26.0 - 35.0 pg    MCHC 32.6 32.0 - 34.5 %    RDW 14.2 11.5 - 15.0 fL    Platelets 92 (L) 262 - 450 E9/L    MPV 8.9 7.0 - 12.0 fL    Neutrophils % 95.2 (H) 43.0 - 80.0 %    Immature Granulocytes % 0.9 0.0 - 5.0 %    Lymphocytes % 2.5 (L) 20.0 - 42.0 %    Monocytes % 1.4 (L) 2.0 - 12.0 %    Eosinophils % 0.0 0.0 - 6.0 %    Basophils % 0.0 0.0 - 2.0 %    Neutrophils Absolute 5.42 1.80 - 7.30 E9/L    Immature Granulocytes # 0.05 E9/L    Lymphocytes Absolute 0.14 (L) 1.50 - 4.00 E9/L    Monocytes Absolute 0.08 (L) 0.10 - 0.95 E9/L    Eosinophils Absolute 0.00 (L) 0.05 - 0.50 E9/L    Basophils Absolute 0.00 0.00 - 0.20 E9/L    RBC Morphology Normal Comprehensive Metabolic Panel   Result Value Ref Range    Sodium 138 132 - 146 mmol/L    Potassium 3.4 (L) 3.5 - 5.0 mmol/L    Chloride 100 98 - 107 mmol/L    CO2 29 22 - 29 mmol/L    Anion Gap 9 7 - 16 mmol/L    Glucose 118 (H) 74 - 99 mg/dL    BUN 21 8 - 23 mg/dL    CREATININE 1.0 0.7 - 1.2 mg/dL    GFR Non-African American >60 >=60 mL/min/1.73    GFR African American >60     Calcium 7.9 (L) 8.6 - 10.2 mg/dL    Total Protein 5.8 (L) 6.4 - 8.3 g/dL    Albumin 2.9 (L) 3.5 - 5.2 g/dL    Total Bilirubin 0.4 0.0 - 1.2 mg/dL    Alkaline Phosphatase 49 40 - 129 U/L    ALT 7 0 - 40 U/L    AST 16 0 - 39 U/L   Troponin   Result Value Ref Range    Troponin <0.01 0.00 - 0.03 ng/mL   APTT   Result Value Ref Range    aPTT 25.2 24.5 - 35.1 sec   Protime-INR   Result Value Ref Range    Protime 16.3 (H) 9.3 - 12.4 sec    INR 1.4    Lactic Acid, Plasma   Result Value Ref Range    Lactic Acid 1.5 0.5 - 2.2 mmol/L   Platelet Confirmation   Result Value Ref Range    Platelet Confirmation CONFIRMED    EKG 12 Lead   Result Value Ref Range    Ventricular Rate 113 BPM    Atrial Rate 141 BPM    QRS Duration 98 ms    Q-T Interval 328 ms    QTc Calculation (Bazett) 449 ms    R Axis 55 degrees    T Axis -29 degrees       RADIOLOGY:  CTA PULMONARY W CONTRAST   Final Result   1. No pulmonary embolism. 2.  Patchy ground-glass opacities throughout both lungs suggests bilateral   pneumonia with mixed areas of subsegmental atelectasis. 3.  Minimal bilateral pleural effusions. 4.  Small pericardial effusion. 5.  View of upper abdomen shows splenomegaly. ------------------------- NURSING NOTES AND VITALS REVIEWED ---------------------------  Date / Time Roomed:  2/11/2021 11:19 AM  ED Bed Assignment:  [id number]    The nursing notes within the ED encounter and vital signs as below have been reviewed.      Patient Vitals for the past 24 hrs:   BP Temp Temp src Pulse Resp SpO2 Height Weight   02/11/21 1830 133/69 98.2 °F (36.8 °C) Oral 118 22 91 % -- --   02/11/21 1733 115/71 99.6 °F (37.6 °C) Oral 104 20 93 % -- --   02/11/21 1620 117/79 98.8 °F (37.1 °C) Oral 113 24 90 % -- --   02/11/21 1126 131/75 100.9 °F (38.3 °C) Oral 110 18 (!) 88 % 6' 1\" (1.854 m) 179 lb (81.2 kg)       Oxygen Saturation Interpretation: Abnormal    ------------------------------------------ PROGRESS NOTES ------------------------------------------  Counseling:  I have spoken with the patient and discussed todays results, in addition to providing specific details for the plan of care and counseling regarding the diagnosis and prognosis. Their questions are answered at this time and they are agreeable with the plan of admission.    --------------------------------- ADDITIONAL PROVIDER NOTES ---------------------------------  Consultations:  Spoke with Dr. John Peacock. Discussed case. They will admit the patient. This patient's ED course included: a personal history and physicial examination, re-evaluation prior to disposition, multiple bedside re-evaluations, IV medications, cardiac monitoring, continuous pulse oximetry and complex medical decision making and emergency management    This patient has remained hemodynamically stable during their ED course. Diagnosis:  1. Acute respiratory failure due to COVID-19 [facility])        Disposition:  Patient's disposition: Admit to telemetry  Patient's condition is stable.          Gamaliel Meeks DO  Resident  02/11/21 [id number]

## 2021-02-12 LAB
HCT VFR BLD CALC: 32.8 % (ref 37–54)
HEMOGLOBIN: 10.4 G/DL (ref 12.5–16.5)
MCH RBC QN AUTO: 27.7 PG (ref 26–35)
MCHC RBC AUTO-ENTMCNC: 31.7 % (ref 32–34.5)
MCV RBC AUTO: 87.5 FL (ref 80–99.9)
METER GLUCOSE: 214 MG/DL (ref 74–99)
METER GLUCOSE: 271 MG/DL (ref 74–99)
METER GLUCOSE: 324 MG/DL (ref 74–99)
METER GLUCOSE: 338 MG/DL (ref 74–99)
PDW BLD-RTO: 14.4 FL (ref 11.5–15)
PLATELET # BLD: 120 E9/L (ref 130–450)
PMV BLD AUTO: 9.2 FL (ref 7–12)
RBC # BLD: 3.75 E12/L (ref 3.8–5.8)
WBC # BLD: 4.7 E9/L (ref 4.5–11.5)

## 2021-02-12 PROCEDURE — 1200000000 HC SEMI PRIVATE

## 2021-02-12 PROCEDURE — 82962 GLUCOSE BLOOD TEST: CPT

## 2021-02-12 PROCEDURE — 99233 SBSQ HOSP IP/OBS HIGH 50: CPT | Performed by: INTERNAL MEDICINE

## 2021-02-12 PROCEDURE — 6370000000 HC RX 637 (ALT 250 FOR IP): Performed by: INTERNAL MEDICINE

## 2021-02-12 PROCEDURE — 6360000002 HC RX W HCPCS: Performed by: INTERNAL MEDICINE

## 2021-02-12 PROCEDURE — 2700000000 HC OXYGEN THERAPY PER DAY

## 2021-02-12 PROCEDURE — 85027 COMPLETE CBC AUTOMATED: CPT

## 2021-02-12 PROCEDURE — 2580000003 HC RX 258: Performed by: INTERNAL MEDICINE

## 2021-02-12 PROCEDURE — 36415 COLL VENOUS BLD VENIPUNCTURE: CPT

## 2021-02-12 PROCEDURE — 2500000003 HC RX 250 WO HCPCS: Performed by: INTERNAL MEDICINE

## 2021-02-12 RX ADMIN — INSULIN LISPRO 8 UNITS: 100 INJECTION, SOLUTION INTRAVENOUS; SUBCUTANEOUS at 16:28

## 2021-02-12 RX ADMIN — DOXAZOSIN 4 MG: 4 TABLET ORAL at 10:52

## 2021-02-12 RX ADMIN — Medication 10 ML: at 21:34

## 2021-02-12 RX ADMIN — Medication 10 ML: at 10:57

## 2021-02-12 RX ADMIN — DICYCLOMINE HYDROCHLORIDE 10 MG: 10 CAPSULE ORAL at 10:45

## 2021-02-12 RX ADMIN — METOPROLOL SUCCINATE 50 MG: 50 TABLET, FILM COATED, EXTENDED RELEASE ORAL at 10:53

## 2021-02-12 RX ADMIN — BALSALAZIDE DISODIUM 750 MG: 750 CAPSULE ORAL at 14:00

## 2021-02-12 RX ADMIN — DICYCLOMINE HYDROCHLORIDE 10 MG: 10 CAPSULE ORAL at 06:35

## 2021-02-12 RX ADMIN — DOXAZOSIN 4 MG: 4 TABLET ORAL at 21:33

## 2021-02-12 RX ADMIN — INSULIN LISPRO 2 UNITS: 100 INJECTION, SOLUTION INTRAVENOUS; SUBCUTANEOUS at 23:05

## 2021-02-12 RX ADMIN — REMDESIVIR 100 MG: 100 INJECTION, POWDER, LYOPHILIZED, FOR SOLUTION INTRAVENOUS at 21:33

## 2021-02-12 RX ADMIN — DEXAMETHASONE 6 MG: 4 TABLET ORAL at 16:26

## 2021-02-12 RX ADMIN — INSULIN GLARGINE 20 UNITS: 100 INJECTION, SOLUTION SUBCUTANEOUS at 10:58

## 2021-02-12 RX ADMIN — DICYCLOMINE HYDROCHLORIDE 10 MG: 10 CAPSULE ORAL at 16:26

## 2021-02-12 RX ADMIN — DICYCLOMINE HYDROCHLORIDE 10 MG: 10 CAPSULE ORAL at 21:33

## 2021-02-12 RX ADMIN — PRAVASTATIN SODIUM 20 MG: 20 TABLET ORAL at 21:33

## 2021-02-12 RX ADMIN — INSULIN LISPRO 6 UNITS: 100 INJECTION, SOLUTION INTRAVENOUS; SUBCUTANEOUS at 06:43

## 2021-02-12 RX ADMIN — BALSALAZIDE DISODIUM 750 MG: 750 CAPSULE ORAL at 21:34

## 2021-02-12 RX ADMIN — INSULIN LISPRO 8 UNITS: 100 INJECTION, SOLUTION INTRAVENOUS; SUBCUTANEOUS at 10:58

## 2021-02-12 RX ADMIN — BALSALAZIDE DISODIUM 750 MG: 750 CAPSULE ORAL at 09:00

## 2021-02-12 RX ADMIN — ASPIRIN 81 MG: 81 TABLET, COATED ORAL at 10:53

## 2021-02-12 NOTE — CARE COORDINATION
CM NOTE: Per QFR---covid positive. Recent hospitalization---discharged home 2/8 & did not qualify for home oxygen. Became SOB at home & returned to hospital. Admitted & placed in droplet plus isolation. Using O2 3L. Day #2 IV RDV. Alert & independent. CM spoke with pt to introduce self & discuss role, anticipated LOS & current TX plan. Pt lives with wife in 1 story home & basically stays on ground level. Does not use ADs or O2 at home. IDDM ---has glucometer & testing supplies & tests BGL as directed. No hx SUBHA or HHC. Discussed readmission---returned for increased SOB. Discussed use of O2---weaning & testing to determine if he qualifies for home oxygen. Pt states he has no pref if dme required. Started to read choice list---states to call [employer]. Tentative referral made to [employer]. Declines HHC & states he feels more comfortable with his wife's assistance. Will continue to follow pt. WILL NEED TESTED TO DETERMINE IF PT QUALIFIES FOR HOME OXYGEN.

## 2021-02-12 NOTE — PROGRESS NOTES
Spoke with wife, Cary Turcios, updated on patient careplan and discharge plan. All questions answered. No concerns at this time.

## 2021-02-12 NOTE — PLAN OF CARE
Problem: Falls - Risk of:  Goal: Will remain free from falls  Description: Will remain free from falls  Outcome: Met This Shift  Goal: Absence of physical injury  Description: Absence of physical injury  Outcome: Met This Shift     Problem: Pain:  Goal: Pain level will decrease  Description: Pain level will decrease  Outcome: Met This Shift  Goal: Control of acute pain  Description: Control of acute pain  Outcome: Met This Shift  Goal: Control of chronic pain  Description: Control of chronic pain  Outcome: Met This Shift     Problem: Gas Exchange - Impaired  Goal: Able to breathe comfortably  Description: Able to breathe comfortably  Outcome: Met This Shift  Goal: Absence of hypoxia  Outcome: Met This Shift  Goal: Promote optimal lung function  Outcome: Met This Shift     Problem: Airway Clearance - Ineffective  Goal: Achieve or maintain patent airway  Outcome: Met This Shift     Problem: Breathing Pattern - Ineffective  Goal: Ability to achieve and maintain a regular respiratory rate  Outcome: Met This Shift     Problem:  Body Temperature -  Risk of, Imbalanced  Goal: Ability to maintain a body temperature within defined limits  Outcome: Met This Shift  Goal: Will regain or maintain usual level of consciousness  Outcome: Met This Shift  Goal: Complications related to the disease process, condition or treatment will be avoided or minimized  Outcome: Met This Shift     Problem: Isolation Precautions - Risk of Spread of Infection  Goal: Prevent transmission of infection  Outcome: Met This Shift     Problem: Nutrition Deficits  Goal: Optimize nutritional status  Outcome: Met This Shift     Problem: Risk for Fluid Volume Deficit  Goal: Maintain normal heart rhythm  Outcome: Met This Shift  Goal: Maintain absence of muscle cramping  Outcome: Met This Shift  Goal: Maintain normal serum potassium, sodium, calcium, phosphorus, and pH  Outcome: Met This Shift     Problem: Loneliness or Risk for Loneliness  Goal: Demonstrate positive use of time alone when socialization is not possible  Outcome: Met This Shift     Problem: Fatigue  Goal: Verbalize increase energy and improved vitality  Outcome: Met This Shift     Problem: Patient Education: Go to Patient Education Activity  Goal: Patient/Family Education  Outcome: Met This Shift     Problem: PROTECTIVE PRECAUTIONS  Goal: Isolation precautions  Description: Isolation precautions  Outcome: Met This Shift

## 2021-02-13 LAB
ALBUMIN SERPL-MCNC: 2.7 G/DL (ref 3.5–5.2)
ALP BLD-CCNC: 47 U/L (ref 40–129)
ALT SERPL-CCNC: 7 U/L (ref 0–40)
ANION GAP SERPL CALCULATED.3IONS-SCNC: 10 MMOL/L (ref 7–16)
AST SERPL-CCNC: 18 U/L (ref 0–39)
ATYPICAL LYMPHOCYTE RELATIVE PERCENT: 1 % (ref 0–4)
BASOPHILS ABSOLUTE: 0 E9/L (ref 0–0.2)
BASOPHILS RELATIVE PERCENT: 0 % (ref 0–2)
BILIRUB SERPL-MCNC: 0.4 MG/DL (ref 0–1.2)
BUN BLDV-MCNC: 47 MG/DL (ref 8–23)
BURR CELLS: ABNORMAL
C-REACTIVE PROTEIN: 8.3 MG/DL (ref 0–0.4)
CALCIUM SERPL-MCNC: 8.2 MG/DL (ref 8.6–10.2)
CHLORIDE BLD-SCNC: 101 MMOL/L (ref 98–107)
CO2: 27 MMOL/L (ref 22–29)
CREAT SERPL-MCNC: 1.1 MG/DL (ref 0.7–1.2)
D DIMER: 566 NG/ML DDU
EOSINOPHILS ABSOLUTE: 0 E9/L (ref 0.05–0.5)
EOSINOPHILS RELATIVE PERCENT: 0 % (ref 0–6)
GFR AFRICAN AMERICAN: >60
GFR NON-AFRICAN AMERICAN: >60 ML/MIN/1.73
GLUCOSE BLD-MCNC: 262 MG/DL (ref 74–99)
HCT VFR BLD CALC: 31.7 % (ref 37–54)
HEMOGLOBIN: 10.2 G/DL (ref 12.5–16.5)
LYMPHOCYTES ABSOLUTE: 0.31 E9/L (ref 1.5–4)
LYMPHOCYTES RELATIVE PERCENT: 8 % (ref 20–42)
MCH RBC QN AUTO: 27.9 PG (ref 26–35)
MCHC RBC AUTO-ENTMCNC: 32.2 % (ref 32–34.5)
MCV RBC AUTO: 86.6 FL (ref 80–99.9)
METER GLUCOSE: 170 MG/DL (ref 74–99)
METER GLUCOSE: 279 MG/DL (ref 74–99)
METER GLUCOSE: 286 MG/DL (ref 74–99)
METER GLUCOSE: 358 MG/DL (ref 74–99)
MONOCYTES ABSOLUTE: 0.03 E9/L (ref 0.1–0.95)
MONOCYTES RELATIVE PERCENT: 1 % (ref 2–12)
NEUTROPHILS ABSOLUTE: 3.06 E9/L (ref 1.8–7.3)
NEUTROPHILS RELATIVE PERCENT: 90 % (ref 43–80)
OVALOCYTES: ABNORMAL
PDW BLD-RTO: 14.3 FL (ref 11.5–15)
PLATELET # BLD: 123 E9/L (ref 130–450)
PMV BLD AUTO: 9.6 FL (ref 7–12)
POIKILOCYTES: ABNORMAL
POTASSIUM REFLEX MAGNESIUM: 3.7 MMOL/L (ref 3.5–5)
PROCALCITONIN: 0.09 NG/ML (ref 0–0.08)
RBC # BLD: 3.66 E12/L (ref 3.8–5.8)
SODIUM BLD-SCNC: 138 MMOL/L (ref 132–146)
TOTAL PROTEIN: 5.8 G/DL (ref 6.4–8.3)
WBC # BLD: 3.4 E9/L (ref 4.5–11.5)

## 2021-02-13 PROCEDURE — 2700000000 HC OXYGEN THERAPY PER DAY

## 2021-02-13 PROCEDURE — 2580000003 HC RX 258: Performed by: INTERNAL MEDICINE

## 2021-02-13 PROCEDURE — 85025 COMPLETE CBC W/AUTO DIFF WBC: CPT

## 2021-02-13 PROCEDURE — 86140 C-REACTIVE PROTEIN: CPT

## 2021-02-13 PROCEDURE — 82962 GLUCOSE BLOOD TEST: CPT

## 2021-02-13 PROCEDURE — 85378 FIBRIN DEGRADE SEMIQUANT: CPT

## 2021-02-13 PROCEDURE — 99232 SBSQ HOSP IP/OBS MODERATE 35: CPT | Performed by: INTERNAL MEDICINE

## 2021-02-13 PROCEDURE — 6360000002 HC RX W HCPCS: Performed by: INTERNAL MEDICINE

## 2021-02-13 PROCEDURE — 84145 PROCALCITONIN (PCT): CPT

## 2021-02-13 PROCEDURE — 80053 COMPREHEN METABOLIC PANEL: CPT

## 2021-02-13 PROCEDURE — 6370000000 HC RX 637 (ALT 250 FOR IP): Performed by: INTERNAL MEDICINE

## 2021-02-13 PROCEDURE — 36415 COLL VENOUS BLD VENIPUNCTURE: CPT

## 2021-02-13 PROCEDURE — 1200000000 HC SEMI PRIVATE

## 2021-02-13 PROCEDURE — 2500000003 HC RX 250 WO HCPCS: Performed by: INTERNAL MEDICINE

## 2021-02-13 RX ADMIN — DEXAMETHASONE 6 MG: 4 TABLET ORAL at 16:01

## 2021-02-13 RX ADMIN — INSULIN LISPRO 1 UNITS: 100 INJECTION, SOLUTION INTRAVENOUS; SUBCUTANEOUS at 21:22

## 2021-02-13 RX ADMIN — INSULIN GLARGINE 20 UNITS: 100 INJECTION, SOLUTION SUBCUTANEOUS at 10:00

## 2021-02-13 RX ADMIN — HYDROCHLOROTHIAZIDE 25 MG: 25 TABLET ORAL at 09:58

## 2021-02-13 RX ADMIN — INSULIN LISPRO 10 UNITS: 100 INJECTION, SOLUTION INTRAVENOUS; SUBCUTANEOUS at 11:17

## 2021-02-13 RX ADMIN — Medication 10 ML: at 20:05

## 2021-02-13 RX ADMIN — REMDESIVIR 100 MG: 100 INJECTION, POWDER, LYOPHILIZED, FOR SOLUTION INTRAVENOUS at 20:06

## 2021-02-13 RX ADMIN — DICYCLOMINE HYDROCHLORIDE 10 MG: 10 CAPSULE ORAL at 20:05

## 2021-02-13 RX ADMIN — INSULIN LISPRO 6 UNITS: 100 INJECTION, SOLUTION INTRAVENOUS; SUBCUTANEOUS at 16:02

## 2021-02-13 RX ADMIN — DOXAZOSIN 4 MG: 4 TABLET ORAL at 09:59

## 2021-02-13 RX ADMIN — PRAVASTATIN SODIUM 20 MG: 20 TABLET ORAL at 20:05

## 2021-02-13 RX ADMIN — AMLODIPINE BESYLATE 10 MG: 10 TABLET ORAL at 09:59

## 2021-02-13 RX ADMIN — LISINOPRIL 20 MG: 20 TABLET ORAL at 09:59

## 2021-02-13 RX ADMIN — DICYCLOMINE HYDROCHLORIDE 10 MG: 10 CAPSULE ORAL at 11:17

## 2021-02-13 RX ADMIN — INSULIN LISPRO 2 UNITS: 100 INJECTION, SOLUTION INTRAVENOUS; SUBCUTANEOUS at 07:14

## 2021-02-13 RX ADMIN — ASPIRIN 81 MG: 81 TABLET, COATED ORAL at 09:59

## 2021-02-13 RX ADMIN — MESALAMINE 1000 MG: 1000 SUPPOSITORY RECTAL at 20:06

## 2021-02-13 RX ADMIN — DICYCLOMINE HYDROCHLORIDE 10 MG: 10 CAPSULE ORAL at 07:14

## 2021-02-13 RX ADMIN — METOPROLOL SUCCINATE 50 MG: 50 TABLET, FILM COATED, EXTENDED RELEASE ORAL at 09:58

## 2021-02-13 RX ADMIN — Medication 10 ML: at 09:59

## 2021-02-13 RX ADMIN — DOXAZOSIN 4 MG: 4 TABLET ORAL at 20:05

## 2021-02-13 RX ADMIN — DICYCLOMINE HYDROCHLORIDE 10 MG: 10 CAPSULE ORAL at 16:01

## 2021-02-13 ASSESSMENT — PAIN SCALES - GENERAL: PAINLEVEL_OUTOF10: 0

## 2021-02-13 NOTE — PROGRESS NOTES
Nedra  Hospitalist   Progress Note    Admitting Date and Time: 2/11/2021 11:19 AM  Admit Dx: [id number]-63 [U07.1]    Subjective:    Patient was admitted with COVID-19 [U07.1]. Patient denies fever, chills, cp, n/v. Pt with some sob.  dexamethasone  6 mg Oral Q24H    aspirin  81 mg Oral Daily    balsalazide  750 mg Oral TID    mesalamine  1,000 mg Rectal Nightly    dicyclomine  10 mg Oral 4x Daily AC & HS    hydroCHLOROthiazide  25 mg Oral Daily    insulin glargine  20 Units Subcutaneous QAM    metoprolol succinate  50 mg Oral Daily    pravastatin  20 mg Oral Nightly    doxazosin  4 mg Oral 2 times per day    sodium chloride flush  10 mL Intravenous 2 times per day    remdesivir IVPB  100 mg Intravenous Q24H    insulin lispro  0-12 Units Subcutaneous TID WC    insulin lispro  0-6 Units Subcutaneous Nightly    amLODIPine  10 mg Oral Daily    And    lisinopril  20 mg Oral Daily         sodium chloride flush, 10 mL, PRN      potassium chloride, 40 mEq, PRN    Or      potassium alternative oral replacement, 40 mEq, PRN    Or      potassium chloride, 10 mEq, PRN      promethazine, 12.5 mg, Q6H PRN    Or      ondansetron, 4 mg, Q6H PRN      polyethylene glycol, 17 g, Daily PRN      acetaminophen, 650 mg, Q6H PRN    Or      acetaminophen, 650 mg, Q6H PRN      sodium chloride, 30 mL, PRN      glucose, 15 g, PRN      dextrose, 12.5 g, PRN      glucagon (rDNA), 1 mg, PRN      dextrose, 100 mL/hr, PRN         Objective:        PHYSICAL EXAM:    Vitals:  /65   Pulse 85   Temp 97.8 °F (36.6 °C) (Oral)   Resp 20   Ht 6' 1\" (1.854 m)   Wt 179 lb (81.2 kg)   SpO2 92%   BMI 23.62 kg/m²     General:  Appears comfortable. Answers questions appropriately and cooperative with exam  HEENT:  Mucous membranes moist. No erythema, rhinorrhea, or post-nasal drip noted. Neck:  No carotid bruits. Heart:  Rhythm regular at rate of 84  Lungs:  CTA.   No wheeze, rales, or rhonchi  Abdomen:  Positive bowel sounds positive. Soft. Non-tender. No guarding, rebound or rigidity. Breast/Rectal/Genitourinary: not pertinent. Extremities:  Negative for lower extremity edema  Skin:  Warm and dry  Vascular: 2/4 Dorsalis Pedis pulses bilaterally. Neuro:  Cranial nerves 2-12 grossly intact, no focal weakness or change in sensation noted. Extraocular muscles intact. Pupils equal, round, reactive to light. Recent Labs     02/11/21  1341      K 3.4*      CO2 29   BUN 21   CREATININE 1.0   GLUCOSE 118*   CALCIUM 7.9*       Recent Labs     02/11/21  1341 02/12/21  0656   WBC 5.7 4.7   RBC 3.43* 3.75*   HGB 9.7* 10.4*   HCT 29.8* 32.8*   MCV 86.9 87.5   MCH 28.3 27.7   MCHC 32.6 31.7*   RDW 14.2 14.4   PLT 92* 120*   MPV 8.9 9.2       CBC:   Lab Results   Component Value Date    WBC 4.7 02/12/2021    RBC 3.75 02/12/2021    HGB 10.4 02/12/2021    HCT 32.8 02/12/2021    MCV 87.5 02/12/2021    MCH 27.7 02/12/2021    MCHC 31.7 02/12/2021    RDW 14.4 02/12/2021     02/12/2021    MPV 9.2 02/12/2021        Radiology:   CTA PULMONARY W CONTRAST   Final Result   1. No pulmonary embolism. 2.  Patchy ground-glass opacities throughout both lungs suggests bilateral   pneumonia with mixed areas of subsegmental atelectasis. 3.  Minimal bilateral pleural effusions. 4.  Small pericardial effusion. 5.  View of upper abdomen shows splenomegaly. Assessment:    Active Problems:    COVID-19  Resolved Problems:    * No resolved hospital problems. *      Plan:  1. Acute respiratory failure with hypoxia ([id number]%) POA adjust o2 as needed  2. Sepsis(fever, tachycardia, infection)POA supportive care and tx underlying infection  3. Pneumonia due to covid 19  Steroids and remdesivir  4. Hypokalemia monitor and replace prn  5. Thrombocytopenia monitor   6. Dm type 2 monitor bs and tx with insulin  7. htn continue meds  8. Hyperlipidemia continue med  9.  Ulcerative colitis

## 2021-02-13 NOTE — PROGRESS NOTES
[facility] Quality Flow/Interdisciplinary Rounds Progress Note        Quality Flow Rounds held on February 13, 2021    Disciplines Attending:  Bedside Nurse, ,  and Nursing Unit Leadership    Jordan Dela Cruz was admitted on 2/11/2021 11:19 AM    Anticipated Discharge Date:  Expected Discharge Date: 02/14/21    Disposition:    Sriram Score:  Sriram Scale Score: 21    Readmission Risk              Risk of Unplanned Readmission:        20           Discussed patient goal for the day, patient clinical progression, and barriers to discharge. The following Goal(s) of the Day/Commitment(s) have been identified:  Remdesivir IV day 2/5, attempt to wean oxygen, check Medical Plan.       Jordan De La O  February 13, 2021

## 2021-02-14 LAB
ALBUMIN SERPL-MCNC: 3 G/DL (ref 3.5–5.2)
ALP BLD-CCNC: 50 U/L (ref 40–129)
ALT SERPL-CCNC: 10 U/L (ref 0–40)
ANION GAP SERPL CALCULATED.3IONS-SCNC: 8 MMOL/L (ref 7–16)
ANISOCYTOSIS: ABNORMAL
AST SERPL-CCNC: 23 U/L (ref 0–39)
BASOPHILS ABSOLUTE: 0 E9/L (ref 0–0.2)
BASOPHILS RELATIVE PERCENT: 0 % (ref 0–2)
BILIRUB SERPL-MCNC: 0.4 MG/DL (ref 0–1.2)
BUN BLDV-MCNC: 45 MG/DL (ref 8–23)
C-REACTIVE PROTEIN: 5.3 MG/DL (ref 0–0.4)
CALCIUM SERPL-MCNC: 8.4 MG/DL (ref 8.6–10.2)
CHLORIDE BLD-SCNC: 101 MMOL/L (ref 98–107)
CO2: 28 MMOL/L (ref 22–29)
CREAT SERPL-MCNC: 0.9 MG/DL (ref 0.7–1.2)
D DIMER: 464 NG/ML DDU
EOSINOPHILS ABSOLUTE: 0 E9/L (ref 0.05–0.5)
EOSINOPHILS RELATIVE PERCENT: 0 % (ref 0–6)
GFR AFRICAN AMERICAN: >60
GFR NON-AFRICAN AMERICAN: >60 ML/MIN/1.73
GLUCOSE BLD-MCNC: 172 MG/DL (ref 74–99)
HCT VFR BLD CALC: 29.6 % (ref 37–54)
HEMOGLOBIN: 9.7 G/DL (ref 12.5–16.5)
IMMATURE GRANULOCYTES #: 0.03 E9/L
IMMATURE GRANULOCYTES %: 0.8 % (ref 0–5)
LYMPHOCYTES ABSOLUTE: 0.2 E9/L (ref 1.5–4)
LYMPHOCYTES RELATIVE PERCENT: 5.6 % (ref 20–42)
MCH RBC QN AUTO: 28 PG (ref 26–35)
MCHC RBC AUTO-ENTMCNC: 32.8 % (ref 32–34.5)
MCV RBC AUTO: 85.3 FL (ref 80–99.9)
METER GLUCOSE: 221 MG/DL (ref 74–99)
METER GLUCOSE: 249 MG/DL (ref 74–99)
METER GLUCOSE: 260 MG/DL (ref 74–99)
METER GLUCOSE: 350 MG/DL (ref 74–99)
MONOCYTES ABSOLUTE: 0.09 E9/L (ref 0.1–0.95)
MONOCYTES RELATIVE PERCENT: 2.5 % (ref 2–12)
NEUTROPHILS ABSOLUTE: 3.22 E9/L (ref 1.8–7.3)
NEUTROPHILS RELATIVE PERCENT: 91.1 % (ref 43–80)
OVALOCYTES: ABNORMAL
PDW BLD-RTO: 13.9 FL (ref 11.5–15)
PLATELET # BLD: 125 E9/L (ref 130–450)
PMV BLD AUTO: 9.1 FL (ref 7–12)
POIKILOCYTES: ABNORMAL
POLYCHROMASIA: ABNORMAL
POTASSIUM REFLEX MAGNESIUM: 3.7 MMOL/L (ref 3.5–5)
RBC # BLD: 3.47 E12/L (ref 3.8–5.8)
SODIUM BLD-SCNC: 137 MMOL/L (ref 132–146)
TOTAL PROTEIN: 5.7 G/DL (ref 6.4–8.3)
WBC # BLD: 3.5 E9/L (ref 4.5–11.5)

## 2021-02-14 PROCEDURE — 85378 FIBRIN DEGRADE SEMIQUANT: CPT

## 2021-02-14 PROCEDURE — 2500000003 HC RX 250 WO HCPCS: Performed by: INTERNAL MEDICINE

## 2021-02-14 PROCEDURE — 2700000000 HC OXYGEN THERAPY PER DAY

## 2021-02-14 PROCEDURE — 6370000000 HC RX 637 (ALT 250 FOR IP): Performed by: INTERNAL MEDICINE

## 2021-02-14 PROCEDURE — 86140 C-REACTIVE PROTEIN: CPT

## 2021-02-14 PROCEDURE — 2580000003 HC RX 258: Performed by: INTERNAL MEDICINE

## 2021-02-14 PROCEDURE — 1200000000 HC SEMI PRIVATE

## 2021-02-14 PROCEDURE — 36415 COLL VENOUS BLD VENIPUNCTURE: CPT

## 2021-02-14 PROCEDURE — 85025 COMPLETE CBC W/AUTO DIFF WBC: CPT

## 2021-02-14 PROCEDURE — 6360000002 HC RX W HCPCS: Performed by: INTERNAL MEDICINE

## 2021-02-14 PROCEDURE — 82962 GLUCOSE BLOOD TEST: CPT

## 2021-02-14 PROCEDURE — 99232 SBSQ HOSP IP/OBS MODERATE 35: CPT | Performed by: INTERNAL MEDICINE

## 2021-02-14 PROCEDURE — 80053 COMPREHEN METABOLIC PANEL: CPT

## 2021-02-14 RX ADMIN — HYDROCHLOROTHIAZIDE 25 MG: 25 TABLET ORAL at 09:04

## 2021-02-14 RX ADMIN — ASPIRIN 81 MG: 81 TABLET, COATED ORAL at 09:05

## 2021-02-14 RX ADMIN — Medication 10 ML: at 09:05

## 2021-02-14 RX ADMIN — INSULIN LISPRO 4 UNITS: 100 INJECTION, SOLUTION INTRAVENOUS; SUBCUTANEOUS at 06:03

## 2021-02-14 RX ADMIN — INSULIN LISPRO 3 UNITS: 100 INJECTION, SOLUTION INTRAVENOUS; SUBCUTANEOUS at 20:52

## 2021-02-14 RX ADMIN — PRAVASTATIN SODIUM 20 MG: 20 TABLET ORAL at 19:52

## 2021-02-14 RX ADMIN — DEXAMETHASONE 6 MG: 4 TABLET ORAL at 16:05

## 2021-02-14 RX ADMIN — MESALAMINE 1000 MG: 1000 SUPPOSITORY RECTAL at 19:53

## 2021-02-14 RX ADMIN — DICYCLOMINE HYDROCHLORIDE 10 MG: 10 CAPSULE ORAL at 05:45

## 2021-02-14 RX ADMIN — AMLODIPINE BESYLATE 10 MG: 10 TABLET ORAL at 09:04

## 2021-02-14 RX ADMIN — DICYCLOMINE HYDROCHLORIDE 10 MG: 10 CAPSULE ORAL at 16:05

## 2021-02-14 RX ADMIN — LISINOPRIL 20 MG: 20 TABLET ORAL at 09:05

## 2021-02-14 RX ADMIN — DOXAZOSIN 4 MG: 4 TABLET ORAL at 19:53

## 2021-02-14 RX ADMIN — REMDESIVIR 100 MG: 100 INJECTION, POWDER, LYOPHILIZED, FOR SOLUTION INTRAVENOUS at 19:52

## 2021-02-14 RX ADMIN — INSULIN GLARGINE 20 UNITS: 100 INJECTION, SOLUTION SUBCUTANEOUS at 09:06

## 2021-02-14 RX ADMIN — INSULIN LISPRO 4 UNITS: 100 INJECTION, SOLUTION INTRAVENOUS; SUBCUTANEOUS at 10:58

## 2021-02-14 RX ADMIN — INSULIN LISPRO 10 UNITS: 100 INJECTION, SOLUTION INTRAVENOUS; SUBCUTANEOUS at 16:05

## 2021-02-14 RX ADMIN — DICYCLOMINE HYDROCHLORIDE 10 MG: 10 CAPSULE ORAL at 10:58

## 2021-02-14 RX ADMIN — Medication 10 ML: at 19:53

## 2021-02-14 RX ADMIN — METOPROLOL SUCCINATE 50 MG: 50 TABLET, FILM COATED, EXTENDED RELEASE ORAL at 09:05

## 2021-02-14 RX ADMIN — DICYCLOMINE HYDROCHLORIDE 10 MG: 10 CAPSULE ORAL at 19:53

## 2021-02-14 RX ADMIN — DOXAZOSIN 4 MG: 4 TABLET ORAL at 09:05

## 2021-02-14 ASSESSMENT — PAIN SCALES - GENERAL
PAINLEVEL_OUTOF10: 0

## 2021-02-14 NOTE — PROGRESS NOTES
[facility] Quality Flow/Interdisciplinary Rounds Progress Note        Quality Flow Rounds held on February 14, 2021    Disciplines Attending:  Bedside Nurse, ,  and Nursing Unit Leadership    Kan Salgado was admitted on 2/11/2021 11:19 AM    Anticipated Discharge Date:  Expected Discharge Date: 02/14/21    Disposition:    Sriram Score:  Sriram Scale Score: 21    Readmission Risk              Risk of Unplanned Readmission:        20           Discussed patient goal for the day, patient clinical progression, and barriers to discharge. The following Goal(s) of the Day/Commitment(s) have been identified: Remdesivir IV day 3/5, check Medical plan, possible discharge today.        Anne Marie Loera  February 14, 2021

## 2021-02-14 NOTE — PROGRESS NOTES
Nedra Carr Hospitalist   Progress Note    Admitting Date and Time: 2/11/2021 11:19 AM  Admit Dx: GNIWS-28 [U07.1]    Subjective:    Patient was admitted with COVID-19 [U07.1]. Patient denies fever, chills, cp, n/v. Pt with some sob.       dexamethasone  6 mg Oral Q24H    aspirin  81 mg Oral Daily    balsalazide  750 mg Oral TID    mesalamine  1,000 mg Rectal Nightly    dicyclomine  10 mg Oral 4x Daily AC & HS    hydroCHLOROthiazide  25 mg Oral Daily    insulin glargine  20 Units Subcutaneous QAM    metoprolol succinate  50 mg Oral Daily    pravastatin  20 mg Oral Nightly    doxazosin  4 mg Oral 2 times per day    sodium chloride flush  10 mL Intravenous 2 times per day    remdesivir IVPB  100 mg Intravenous Q24H    insulin lispro  0-12 Units Subcutaneous TID WC    insulin lispro  0-6 Units Subcutaneous Nightly    amLODIPine  10 mg Oral Daily    And    lisinopril  20 mg Oral Daily         sodium chloride flush, 10 mL, PRN      promethazine, 12.5 mg, Q6H PRN    Or      ondansetron, 4 mg, Q6H PRN      polyethylene glycol, 17 g, Daily PRN      acetaminophen, 650 mg, Q6H PRN    Or      acetaminophen, 650 mg, Q6H PRN      sodium chloride, 30 mL, PRN      glucose, 15 g, PRN      dextrose, 12.5 g, PRN      glucagon (rDNA), 1 mg, PRN      dextrose, 100 mL/hr, PRN         Objective:    BP (!) 98/58   Pulse 83   Temp 97.6 °F (36.4 °C) (Oral)   Resp 18   Ht 6' 1\" (1.854 m)   Wt 179 lb (81.2 kg)   SpO2 93%   BMI 23.62 kg/m²   Skin: warm and dry, no rash or erythema  Pulmonary/Chest: clear to auscultation bilaterally- no wheezes, rales or rhonchi, normal air movement, no respiratory distress  Cardiovascular: rhythm reg at rate of 84  Abdomen: soft, non-tender, non-distended, normal bowel sounds, no masses or organomegaly  Extremities: no cyanosis, no clubbing and no edema      Recent Labs     02/11/21  1341 02/13/21  0602    138   K 3.4* 3.7    101   CO2 29 27   BUN 21 47*   CREATININE 1.0 1.1   GLUCOSE 118* 262*   CALCIUM 7.9* 8.2*       Recent Labs     02/11/21  1341 02/12/21  0656 02/13/21  0602   WBC 5.7 4.7 3.4*   RBC 3.43* 3.75* 3.66*   HGB 9.7* 10.4* 10.2*   HCT 29.8* 32.8* 31.7*   MCV 86.9 87.5 86.6   MCH 28.3 27.7 27.9   MCHC 32.6 31.7* 32.2   RDW 14.2 14.4 14.3   PLT 92* 120* 123*   MPV 8.9 9.2 9.6       CBC with Differential:    Lab Results   Component Value Date    WBC 3.4 02/13/2021    RBC 3.66 02/13/2021    HGB 10.2 02/13/2021    HCT 31.7 02/13/2021     02/13/2021    MCV 86.6 02/13/2021    MCH 27.9 02/13/2021    MCHC 32.2 02/13/2021    RDW 14.3 02/13/2021    NRBC 0.0 02/07/2021    LYMPHOPCT 8.0 02/13/2021    MONOPCT 1.0 02/13/2021    BASOPCT 0.0 02/13/2021    MONOSABS 0.03 02/13/2021    LYMPHSABS 0.31 02/13/2021    EOSABS 0.00 02/13/2021    BASOSABS 0.00 02/13/2021     CMP:    Lab Results   Component Value Date     02/13/2021    K 3.7 02/13/2021     02/13/2021    CO2 27 02/13/2021    BUN 47 02/13/2021    CREATININE 1.1 02/13/2021    GFRAA >60 02/13/2021    LABGLOM >60 02/13/2021    GLUCOSE 262 02/13/2021    GLUCOSE 119 12/30/2010    PROT 5.8 02/13/2021    LABALBU 2.7 02/13/2021    CALCIUM 8.2 02/13/2021    BILITOT 0.4 02/13/2021    ALKPHOS 47 02/13/2021    AST 18 02/13/2021    ALT 7 02/13/2021        Radiology:   CTA PULMONARY W CONTRAST   Final Result   1. No pulmonary embolism. 2.  Patchy ground-glass opacities throughout both lungs suggests bilateral   pneumonia with mixed areas of subsegmental atelectasis. 3.  Minimal bilateral pleural effusions. 4.  Small pericardial effusion. 5.  View of upper abdomen shows splenomegaly. Assessment:    Active Problems:    COVID-19    Acute respiratory failure with hypoxia (HCC)    Pneumonia due to COVID-19 virus    Thrombocytopenia (HCC)  Resolved Problems:    * No resolved hospital problems. *      Plan:  1.  Acute respiratory failure with hypoxia (60xhe31%) POA wean o2 as able  2. Sepsis(fever, tachycardia, infection)POA supportive care and tx underlying infection  3. Pneumonia due to covid 19  continue steroids and remdesivir  4. Hypokalemia monitor and replace prn improved. 5. Thrombocytopenia monitor   6. Dm type 2 monitor bs and tx with insulin  7. htn continue meds  8. Hyperlipidemia continue med  9.  Ulcerative colitis continue chronic meds        Electronically signed by Michael Cary DO on 2/13/2021 at 7:29 PM

## 2021-02-15 VITALS
TEMPERATURE: 97.5 F | OXYGEN SATURATION: 90 % | SYSTOLIC BLOOD PRESSURE: 119 MMHG | RESPIRATION RATE: 18 BRPM | BODY MASS INDEX: 23.72 KG/M2 | HEART RATE: 101 BPM | DIASTOLIC BLOOD PRESSURE: 62 MMHG | WEIGHT: 179 LBS | HEIGHT: 73 IN

## 2021-02-15 LAB
ALBUMIN SERPL-MCNC: 2.9 G/DL (ref 3.5–5.2)
ALP BLD-CCNC: 52 U/L (ref 40–129)
ALT SERPL-CCNC: 12 U/L (ref 0–40)
ANION GAP SERPL CALCULATED.3IONS-SCNC: 8 MMOL/L (ref 7–16)
AST SERPL-CCNC: 20 U/L (ref 0–39)
BASOPHILS ABSOLUTE: 0 E9/L (ref 0–0.2)
BASOPHILS RELATIVE PERCENT: 0 % (ref 0–2)
BILIRUB SERPL-MCNC: 0.4 MG/DL (ref 0–1.2)
BUN BLDV-MCNC: 34 MG/DL (ref 8–23)
CALCIUM SERPL-MCNC: 8.2 MG/DL (ref 8.6–10.2)
CHLORIDE BLD-SCNC: 100 MMOL/L (ref 98–107)
CO2: 27 MMOL/L (ref 22–29)
CREAT SERPL-MCNC: 0.8 MG/DL (ref 0.7–1.2)
EOSINOPHILS ABSOLUTE: 0 E9/L (ref 0.05–0.5)
EOSINOPHILS RELATIVE PERCENT: 0 % (ref 0–6)
GFR AFRICAN AMERICAN: >60
GFR NON-AFRICAN AMERICAN: >60 ML/MIN/1.73
GLUCOSE BLD-MCNC: 173 MG/DL (ref 74–99)
HCT VFR BLD CALC: 27.1 % (ref 37–54)
HEMOGLOBIN: 9.4 G/DL (ref 12.5–16.5)
IMMATURE GRANULOCYTES #: 0.02 E9/L
IMMATURE GRANULOCYTES %: 0.6 % (ref 0–5)
LYMPHOCYTES ABSOLUTE: 0.15 E9/L (ref 1.5–4)
LYMPHOCYTES RELATIVE PERCENT: 4.4 % (ref 20–42)
MAGNESIUM: 2 MG/DL (ref 1.6–2.6)
MCH RBC QN AUTO: 28.7 PG (ref 26–35)
MCHC RBC AUTO-ENTMCNC: 34.7 % (ref 32–34.5)
MCV RBC AUTO: 82.6 FL (ref 80–99.9)
METER GLUCOSE: 241 MG/DL (ref 74–99)
METER GLUCOSE: 283 MG/DL (ref 74–99)
MONOCYTES ABSOLUTE: 0.1 E9/L (ref 0.1–0.95)
MONOCYTES RELATIVE PERCENT: 2.9 % (ref 2–12)
NEUTROPHILS ABSOLUTE: 3.12 E9/L (ref 1.8–7.3)
NEUTROPHILS RELATIVE PERCENT: 92.1 % (ref 43–80)
PDW BLD-RTO: 13.7 FL (ref 11.5–15)
PLATELET # BLD: 118 E9/L (ref 130–450)
PMV BLD AUTO: 9.2 FL (ref 7–12)
POLYCHROMASIA: ABNORMAL
POTASSIUM REFLEX MAGNESIUM: 3.5 MMOL/L (ref 3.5–5)
RBC # BLD: 3.28 E12/L (ref 3.8–5.8)
SODIUM BLD-SCNC: 135 MMOL/L (ref 132–146)
TOTAL PROTEIN: 5.3 G/DL (ref 6.4–8.3)
WBC # BLD: 3.4 E9/L (ref 4.5–11.5)

## 2021-02-15 PROCEDURE — 36415 COLL VENOUS BLD VENIPUNCTURE: CPT

## 2021-02-15 PROCEDURE — 83735 ASSAY OF MAGNESIUM: CPT

## 2021-02-15 PROCEDURE — 6360000002 HC RX W HCPCS: Performed by: INTERNAL MEDICINE

## 2021-02-15 PROCEDURE — 6370000000 HC RX 637 (ALT 250 FOR IP): Performed by: INTERNAL MEDICINE

## 2021-02-15 PROCEDURE — 2500000003 HC RX 250 WO HCPCS: Performed by: INTERNAL MEDICINE

## 2021-02-15 PROCEDURE — 99239 HOSP IP/OBS DSCHRG MGMT >30: CPT | Performed by: INTERNAL MEDICINE

## 2021-02-15 PROCEDURE — 85025 COMPLETE CBC W/AUTO DIFF WBC: CPT

## 2021-02-15 PROCEDURE — 2580000003 HC RX 258: Performed by: INTERNAL MEDICINE

## 2021-02-15 PROCEDURE — 80053 COMPREHEN METABOLIC PANEL: CPT

## 2021-02-15 PROCEDURE — 82962 GLUCOSE BLOOD TEST: CPT

## 2021-02-15 RX ORDER — LORAZEPAM 2 MG/ML
INJECTION INTRAMUSCULAR
Status: DISCONTINUED
Start: 2021-02-15 | End: 2021-02-15 | Stop reason: HOSPADM

## 2021-02-15 RX ORDER — DEXAMETHASONE 6 MG/1
6 TABLET ORAL EVERY 24 HOURS
Qty: 6 TABLET | Refills: 0 | Status: SHIPPED | OUTPATIENT
Start: 2021-02-15 | End: 2021-02-21

## 2021-02-15 RX ADMIN — METOPROLOL SUCCINATE 50 MG: 50 TABLET, FILM COATED, EXTENDED RELEASE ORAL at 10:30

## 2021-02-15 RX ADMIN — REMDESIVIR 100 MG: 100 INJECTION, POWDER, LYOPHILIZED, FOR SOLUTION INTRAVENOUS at 17:17

## 2021-02-15 RX ADMIN — DICYCLOMINE HYDROCHLORIDE 10 MG: 10 CAPSULE ORAL at 10:35

## 2021-02-15 RX ADMIN — INSULIN LISPRO 2 UNITS: 100 INJECTION, SOLUTION INTRAVENOUS; SUBCUTANEOUS at 11:40

## 2021-02-15 RX ADMIN — LISINOPRIL 20 MG: 20 TABLET ORAL at 10:30

## 2021-02-15 RX ADMIN — HYDROCHLOROTHIAZIDE 25 MG: 25 TABLET ORAL at 10:30

## 2021-02-15 RX ADMIN — Medication 10 ML: at 10:30

## 2021-02-15 RX ADMIN — AMLODIPINE BESYLATE 10 MG: 10 TABLET ORAL at 10:30

## 2021-02-15 RX ADMIN — DICYCLOMINE HYDROCHLORIDE 10 MG: 10 CAPSULE ORAL at 17:17

## 2021-02-15 RX ADMIN — DICYCLOMINE HYDROCHLORIDE 10 MG: 10 CAPSULE ORAL at 05:33

## 2021-02-15 RX ADMIN — INSULIN GLARGINE 20 UNITS: 100 INJECTION, SOLUTION SUBCUTANEOUS at 10:35

## 2021-02-15 RX ADMIN — INSULIN LISPRO 4 UNITS: 100 INJECTION, SOLUTION INTRAVENOUS; SUBCUTANEOUS at 06:06

## 2021-02-15 RX ADMIN — ASPIRIN 81 MG: 81 TABLET, COATED ORAL at 10:30

## 2021-02-15 RX ADMIN — DEXAMETHASONE 6 MG: 4 TABLET ORAL at 17:17

## 2021-02-15 RX ADMIN — DOXAZOSIN 4 MG: 4 TABLET ORAL at 10:30

## 2021-02-15 NOTE — PROGRESS NOTES
Pulse ox was ___90___% on room air at rest.  Ambulated patient on room air. Oxygen saturation was ____87_% on room air while ambulating. Recovery pulse ox was ___91___% on ___0____ liters of oxygen while ambulating. Pt did not want to place oxygen on.

## 2021-02-15 NOTE — CARE COORDINATION
CM NOTE: Per QFR--- covid positive. Day #5 IV RDV. Nurse reports pt having some confusion over the weekend. Will need O2 testing prior to discharge to determine if pt qualifies for home oxygen[facility] Declines [facility]. CM & SW continue to follow pt. QUALIFIED FOR HOME OXYGEN. Referral called to Kellen Cain @ [facility]. Will deliver potable O2 to hospital. WILL HAVE TO WAIT FOR DELIVERY OF O2 TO HOSPITAL.

## 2021-02-15 NOTE — PROGRESS NOTES
Pulse ox was __90____% on room air at rest.  Ambulated patient on room air. Oxygen saturation was __87____% on room air while ambulating. Oxygen applied. Recovery pulse ox was ___91___% on ___1-2____ liters of oxygen while ambulating.

## 2021-02-15 NOTE — PROGRESS NOTES
PIV & tele D/C'd.  D/C instructions and portable O2 tank instructions given to wife while in POV due to pt's periods of confusion.

## 2021-02-15 NOTE — DISCHARGE SUMMARY
[facility] EMERGENCY SERVICE Physician Discharge Summary       No follow-up provider specified. Activity level: as yasmine    Diet: DIET GENERAL; Carb Control: 4 carb choices (60 gms)/meal    Dispo:home    Condition at discharge: fair        Patient ID:  Naheed Sanchez  [id number]  85 y.o.  1955    Admit date: 2/11/2021    Discharge date and time:  2/15/2021  5:56 PM    Admission Diagnoses: Active Problems:    COVID-19    Acute respiratory failure with hypoxia (HCC)    Pneumonia due to COVID-19 virus    Thrombocytopenia (HCC)  Resolved Problems:    * No resolved hospital problems. *      Discharge Diagnoses: Active Problems:    COVID-19    Acute respiratory failure with hypoxia (HCC)    Pneumonia due to COVID-19 virus    Thrombocytopenia (HCC)  Resolved Problems:    * No resolved hospital problems. *    Acute respiratory failure with hypoxia  Sepsis  Pneumonia due to covid 19  Hypokalemia  Thrombocytopenia  Dm type 2 uncontrolled  htn  Hyperlipidemia  Ulcerative colitis      Consults:  IP CONSULT TO PHARMACY    Procedures: none    Hospital Course: Patient was admitted with COVID-19 [U07.1]. Patient is a 75-year-old male with past medical history significant for ulcerative colitis, diagnosed with COVID-19 6 days ago. He presented to the emergency department with complaints of worsening dyspnea. He states it has been constant for the past few days, and gets worse with ambulation. He is also reporting loss of sense of taste and smell, as well as with generalized weakness, and generalized malaise. Initially, he was saturating normally on room air, but ultimately became hypoxic with ambulation and was placed on supplemental oxygen. ED work-up was significant for CT of the chest showing bilateral infiltrates suggesting COVID-19 pneumonia. Medicine was asked to admit for acute hypoxic respiratory failure secondary to COVID-19 pneumonia. Pt seen and examined.  Pt had been given remdesivir and steroids and improved. On day of discharge, pt denied fevers, chills,n/v. Discharge planning d/w pt. Time given for questions and all questions answered. Discharge Exam:  Vitals:    02/14/21 0900 02/14/21 1545 02/14/21 1945 02/15/21 1030   BP: 132/76 110/65 117/70 119/62   Pulse: 82 86 72 101   Resp: 20 18 18 18   Temp: 97.9 °F (36.6 °C) 98.1 °F (36.7 °C) 98.1 °F (36.7 °C) 97.5 °F (36.4 °C)   TempSrc: Oral Oral Oral Oral   SpO2: 93% 94% 92% 90%   Weight:       Height:           Skin: warm and dry, no rash or erythema  Pulmonary/Chest: clear to auscultation bilaterally- no wheezes, rales or rhonchi, normal air movement, no respiratory distress  Cardiovascular: rhythm reg at rate of 80  Abdomen: soft, non-tender, non-distended, normal bowel sounds, no masses or organomegaly  Extremities: no cyanosis, no clubbing and no edema  No intake/output data recorded. No intake/output data recorded. LABS:  Recent Labs     02/13/21  0602 02/14/21  0330 02/15/21  0230    137 135   K 3.7 3.7 3.5    101 100   CO2 27 28 27   BUN 47* 45* 34*   CREATININE 1.1 0.9 0.8   GLUCOSE 262* 172* 173*   CALCIUM 8.2* 8.4* 8.2*       Recent Labs     02/13/21  0602 02/14/21  0330 02/15/21  0230   WBC 3.4* 3.5* 3.4*   RBC 3.66* 3.47* 3.28*   HGB 10.2* 9.7* 9.4*   HCT 31.7* 29.6* 27.1*   MCV 86.6 85.3 82.6   MCH 27.9 28.0 28.7   MCHC 32.2 32.8 34.7*   RDW 14.3 13.9 13.7   * 125* 118*   MPV 9.6 9.1 9.2       No results for input(s): POCGLU in the last 72 hours.     CBC with Differential:    Lab Results   Component Value Date    WBC 3.4 02/15/2021    RBC 3.28 02/15/2021    HGB 9.4 02/15/2021    HCT 27.1 02/15/2021     02/15/2021    MCV 82.6 02/15/2021    MCH 28.7 02/15/2021    MCHC 34.7 02/15/2021    RDW 13.7 02/15/2021    NRBC 0.0 02/07/2021    LYMPHOPCT 4.4 02/15/2021    MONOPCT 2.9 02/15/2021    BASOPCT 0.0 02/15/2021    MONOSABS 0.10 02/15/2021    LYMPHSABS 0.15 02/15/2021    EOSABS 0.00 02/15/2021 BASOSABS 0.00 02/15/2021     CMP:    Lab Results   Component Value Date     02/15/2021    K 3.5 02/15/2021     02/15/2021    CO2 27 02/15/2021    BUN 34 02/15/2021    CREATININE 0.8 02/15/2021    GFRAA >60 02/15/2021    LABGLOM >60 02/15/2021    GLUCOSE 173 02/15/2021    GLUCOSE 119 12/30/2010    PROT 5.3 02/15/2021    LABALBU 2.9 02/15/2021    CALCIUM 8.2 02/15/2021    BILITOT 0.4 02/15/2021    ALKPHOS 52 02/15/2021    AST 20 02/15/2021    ALT 12 02/15/2021     Magnesium:    Lab Results   Component Value Date    MG 2.0 02/15/2021       Imaging:   CTA PULMONARY W CONTRAST   Final Result   1. No pulmonary embolism. 2.  Patchy ground-glass opacities throughout both lungs suggests bilateral   pneumonia with mixed areas of subsegmental atelectasis. 3.  Minimal bilateral pleural effusions. 4.  Small pericardial effusion. 5.  View of upper abdomen shows splenomegaly.           Patient Instructions:      Medication List      START taking these medications    dexamethasone 6 MG tablet  Commonly known as: DECADRON  Take 1 tablet by mouth every 24 hours for 6 days        CONTINUE taking these medications    ANDROGEL TD     aspirin 81 MG EC tablet     balsalazide 750 MG capsule  Commonly known as: COLAZAL     Canasa 1000 MG suppository  Generic drug: mesalamine     Cialis 20 MG tablet  Generic drug: tadalafil     dicyclomine 10 MG capsule  Commonly known as: BENTYL     hydroCHLOROthiazide 25 MG tablet  Commonly known as: HYDRODIURIL     insulin glargine 100 UNIT/ML injection vial  Commonly known as: LANTUS     Lotrel 10-20 MG per capsule  Generic drug: amLODIPine-benazepril     metoprolol succinate 50 MG extended release tablet  Commonly known as: TOPROL XL     NovoLOG Mix 70/30 FlexPen (70-30) 100 UNIT/ML injection  Generic drug: insulin aspart protamine-insulin aspart     * POTASSIUM CHLORIDE PO     * POTASSIUM CHLORIDE PO     pravastatin 20 MG tablet  Commonly known as: PRAVACHOL     * terazosin 2 MG capsule  Commonly known as: HYTRIN     * terazosin 2 MG capsule  Commonly known as: HYTRIN     Turmeric 500 MG Caps     VITAMIN D PO     ZINC OXIDE (TOPICAL) 10 % Crea         * This list has 4 medication(s) that are the same as other medications prescribed for you. Read the directions carefully, and ask your doctor or other care provider to review them with you.             STOP taking these medications    levoFLOXacin 750 MG tablet  Commonly known as: LEVAQUIN     predniSONE 10 MG tablet  Commonly known as: Lucy Nicholson           Where to Get Your Medications      These medications were sent to [address] [place] [address]    Phone: [phone]   · dexamethasone 6 MG tablet         Total time for discharge is 37 min    Signed:  Electronically signed by Joceline Duckworth DO on 2/15/2021 at 5:56 PM

## 2021-02-15 NOTE — PROGRESS NOTES
Nedra Carr Hospitalist   Progress Note    Admitting Date and Time: 2/11/2021 11:19 AM  Admit Dx: DEVONTELU-09 [U07.1]    Subjective:    Patient was admitted with COVID-19 [U07.1].  Patient denies fever, chills, cp, sob, n/v.     dexamethasone  6 mg Oral Q24H    aspirin  81 mg Oral Daily    balsalazide  750 mg Oral TID    mesalamine  1,000 mg Rectal Nightly    dicyclomine  10 mg Oral 4x Daily AC & HS    hydroCHLOROthiazide  25 mg Oral Daily    insulin glargine  20 Units Subcutaneous QAM    metoprolol succinate  50 mg Oral Daily    pravastatin  20 mg Oral Nightly    doxazosin  4 mg Oral 2 times per day    sodium chloride flush  10 mL Intravenous 2 times per day    remdesivir IVPB  100 mg Intravenous Q24H    insulin lispro  0-12 Units Subcutaneous TID WC    insulin lispro  0-6 Units Subcutaneous Nightly    amLODIPine  10 mg Oral Daily    And    lisinopril  20 mg Oral Daily         sodium chloride flush, 10 mL, PRN      promethazine, 12.5 mg, Q6H PRN    Or      ondansetron, 4 mg, Q6H PRN      polyethylene glycol, 17 g, Daily PRN      acetaminophen, 650 mg, Q6H PRN    Or      acetaminophen, 650 mg, Q6H PRN      sodium chloride, 30 mL, PRN      glucose, 15 g, PRN      dextrose, 12.5 g, PRN      glucagon (rDNA), 1 mg, PRN      dextrose, 100 mL/hr, PRN         Objective:    /70   Pulse 72   Temp 98.1 °F (36.7 °C) (Oral)   Resp 18   Ht 6' 1\" (1.854 m)   Wt 179 lb (81.2 kg)   SpO2 92%   BMI 23.62 kg/m²   Skin: warm and dry, no rash or erythema  Pulmonary/Chest: clear to auscultation bilaterally- no wheezes, rales or rhonchi, normal air movement, no respiratory distress  Cardiovascular: rhythm reg at rate of 76  Abdomen: soft, non-tender, non-distended, normal bowel sounds, no masses or organomegaly  Extremities: no cyanosis, no clubbing and no edema      Recent Labs     02/13/21  0602 02/14/21  0330    137   K 3.7 3.7    101   CO2 27 28   BUN 47* 45* able  2. Sepsis(fever, tachycardia, infection)POA supportive care and tx underlying infection  3. Pneumonia due to covid 19  continue steroids and remdesivir  4. Hypokalemia monitor and replace prn improved. 5. Thrombocytopenia monitor   6. Dm type 2 monitor bs and tx with insulin  7. htn continue meds  8. Hyperlipidemia continue med  9.  Ulcerative colitis continue chronic meds    Hopeful discharge soon if continues to improve, possibly tomorrow after dose of remdesivir    Electronically signed by Monserrat Cui DO on 2/14/2021 at 9:23 PM

## 2021-02-16 ENCOUNTER — CARE COORDINATION (OUTPATIENT)
Dept: CASE MANAGEMENT | Age: 66
End: 2021-02-16

## 2021-02-16 NOTE — CARE COORDINATION
Patient contacted regarding TLAPR-88 diagnosis\". Discussed COVID-19 related testing which was available at this time. Test results were positive. Patient informed of results, if available? Yes    Care Transition Nurse/ Ambulatory Care Manager contacted the patient by telephone to perform post discharge assessment. Call within 2 business days of discharge: Yes. Verified name and  with patient as identifiers. Provided introduction to self, and explanation of the CTN/ACM role, and reason for call due to risk factors for infection and/or exposure to COVID-19. Symptoms reviewed with patient who verbalized the following symptoms: no new symptoms and no worsening symptoms. Due to no new or worsening symptoms encounter was not routed to provider for escalation. Discussed follow-up appointments. Future Appointments   Date Time Provider Yari Bradley   2021  9:00 AM Lucy Bianchi MD [address] [facility] follow up appointment(s): [address] [facility]    Non-face-to-face services provided:  Obtained and reviewed discharge summary and/or continuity of care documents     Advance Care Planning:   Does patient have an Advance Directive:  reviewed and current. Patient has following risk factors of: pneumonia, acute respiratory failure and diabetes. CTN/ACM reviewed discharge instructions, medical action plan and red flags such as increased shortness of breath, increasing fever and signs of decompensation with patient who verbalized understanding. Discussed exposure protocols and quarantine with CDC Guidelines What to do if you are sick with coronavirus disease .  Patient was given an opportunity for questions and concerns. The patient agrees to contact the [facility] exposure line [phone], local [facility] department [place] [facility]: ([phone]) and PCP office for questions related to their healthcare.  CTN/ACM provided contact information for future needs.    Reviewed and educated patient on any new and changed medications related to discharge diagnosis     Patient/family/caregiver given information for [employer] [facility] and agrees to enroll vikash    Spoke with Guero for initial COVID monitoring call post hospital discharge. He reports that he is feeling \"pretty good\" today. He denies SOB, cough, or chest tightness. He stated he is only wearing the O2 \"sometimes\". His pulse ox reading was 91% earlier today on RA, during this call it was 87-90%, CTN encouraged him to wear it, even if for a half hour at a time, he verbalized understanding and stated he will put it on now for a while. He is taking the decadron as ordered. He plans to call Dr. Arielle Hinojosa and denies needing assistance with scheduling. Arsen Gabriel denies any needs, questions, or concerns at this time. Plan for follow-up call in 5-7 days based on severity of symptoms and risk factors.

## 2021-02-19 ENCOUNTER — CARE COORDINATION (OUTPATIENT)
Dept: CASE MANAGEMENT | Age: 66
End: 2021-02-19

## 2021-02-19 NOTE — CARE COORDINATION
CTN final outreach call for [address] Transitions episode on 2/19/2021  Mi Mendoza has been provided the following resources and education related to COVID-19:                         Signs, symptoms and red flags related to COVID-19            CDC exposure and quarantine guidelines            Conduit exposure contact - [phone]            Contact for their local Karan Maritza with [facility] for final outreach call for [address] [facility], [address], he reports that he continues to feel \"good\" today. He denies SOB, cough, or fever. He is wearing 2L o2 \"off and on\" and reports that his pulse ox reading has been around 92-93%. He had a telephone visit with Dr. Mable Chandler today and will see him in the office next week. Roxanna Sena denies any needs, questions, or concerns at this time. No further outreach scheduled with this CTN. Patient has this CTN contact information if future needs arise.

## 2021-02-24 ENCOUNTER — HOSPITAL ENCOUNTER (OUTPATIENT)
Age: 66
Discharge: HOME | End: 2021-02-26
Payer: MEDICARE

## 2021-02-24 ENCOUNTER — HOSPITAL ENCOUNTER (OUTPATIENT)
Dept: GENERAL RADIOLOGY | Age: 66
Discharge: HOME | End: 2021-02-26
Payer: MEDICARE

## 2021-02-24 DIAGNOSIS — U07.1 COVID-19: ICD-10-CM

## 2021-02-24 PROCEDURE — 71046 X-RAY EXAM CHEST 2 VIEWS: CPT

## 2021-04-06 ENCOUNTER — HOSPITAL ENCOUNTER (OUTPATIENT)
Age: 66
Discharge: HOME | End: 2021-04-08
Payer: MEDICARE

## 2021-04-06 ENCOUNTER — HOSPITAL ENCOUNTER (OUTPATIENT)
Dept: GENERAL RADIOLOGY | Age: 66
Discharge: HOME | End: 2021-04-08
Payer: MEDICARE

## 2021-04-06 ENCOUNTER — OFFICE VISIT (OUTPATIENT)
Dept: CARDIOLOGY CLINIC | Age: 66
End: 2021-04-06
Payer: MEDICARE

## 2021-04-06 VITALS
HEART RATE: 82 BPM | BODY MASS INDEX: 24.68 KG/M2 | DIASTOLIC BLOOD PRESSURE: 83 MMHG | WEIGHT: 186.2 LBS | OXYGEN SATURATION: 100 % | RESPIRATION RATE: 16 BRPM | SYSTOLIC BLOOD PRESSURE: 139 MMHG | HEIGHT: 73 IN

## 2021-04-06 DIAGNOSIS — R05.9 COUGH: ICD-10-CM

## 2021-04-06 DIAGNOSIS — I48.11 LONGSTANDING PERSISTENT ATRIAL FIBRILLATION (HCC): ICD-10-CM

## 2021-04-06 DIAGNOSIS — I25.10 ATHEROSCLEROSIS OF NATIVE CORONARY ARTERY OF NATIVE HEART WITHOUT ANGINA PECTORIS: Primary | ICD-10-CM

## 2021-04-06 PROCEDURE — 99213 OFFICE O/P EST LOW 20 MIN: CPT | Performed by: INTERNAL MEDICINE

## 2021-04-06 PROCEDURE — 93000 ELECTROCARDIOGRAM COMPLETE: CPT | Performed by: INTERNAL MEDICINE

## 2021-04-06 PROCEDURE — 71046 X-RAY EXAM CHEST 2 VIEWS: CPT

## 2021-04-06 RX ORDER — INSULIN LISPRO 100 [IU]/ML
INJECTION, SOLUTION INTRAVENOUS; SUBCUTANEOUS
COMMUNITY
Start: 2021-03-04 | End: 2022-05-19 | Stop reason: CLARIF

## 2021-04-06 RX ORDER — PREDNISONE 2.5 MG
TABLET ORAL
COMMUNITY
Start: 2021-01-22 | End: 2021-10-05 | Stop reason: CLARIF

## 2021-04-06 NOTE — PROGRESS NOTES
Chief Complaint   Patient presents with    Atrial Fibrillation    Coronary Artery Disease       Patient Active Problem List    Diagnosis Date Noted    Thrombocytopenia ([facility]     COVID-19 02/06/2021    History of echocardiogram 10/01/2020     Overview Note:     A. Echo 8/17/2020: EF 55%, mild LVH, mild MR      Longstanding persistent atrial fibrillation ([facility] 07/16/2020     Overview Note:     A. CHADS-VASC=4      Drug-induced pancytopenia ([facility] 04/02/2012    Coronary atherosclerosis of native coronary artery 03/07/2012     Overview Note:       A. Cardiac catheterization, 4/15/05. Moderate coronary atherosclerosis in RCA, circumflex and LAD arteries. Normal LV systolic function. LVEF 65%. B. Exercise Myoview 4/21/08.  13 METS, no chest pain, normal ECG, normal scan.  Hypertension     Hyperlipidemia      Overview Note:       C. Possible LFT abnormality with Lipitor. Myalgias with crestor      Diabetes mellitus ([facility]     Adrenal adenoma 07/06/2011    Testicular hypofunction 07/06/2011    Ulcerative colitis ([facility] 06/17/2011     Overview Note:     Last Assessment & Plan:   Continue with medical management  Appreciate ongoing GI input  No surgical indication at this time         Current Outpatient Medications   Medication Sig Dispense Refill    HUMALOG KWIKPEN 100 UNIT/ML SOPN       predniSONE (DELTASONE) 2.5 MG tablet take 7 tablets by mouth once daily for 7 days then 6 tablets once. ..  (REFER TO PRESCRIPTION NOTES).       terazosin (HYTRIN) 2 MG capsule Take 7 mg by mouth every morning      POTASSIUM CHLORIDE PO Take 40 mEq by mouth every morning      Turmeric 500 MG CAPS Take 500 mg by mouth every morning       dicyclomine (BENTYL) 10 MG capsule Take 10 mg by mouth 4 times daily (before meals and nightly)      CANASA 1000 MG suppository unwrap and insert 1 suppository rectally at bedtime  0    metoprolol succinate (TOPROL XL) 50 MG extended release tablet Take 50 mg by mouth daily       CIALIS 20 MG tablet Take 20 mg by mouth as needed       amlodipine-benazepril (LOTREL) 10-20 MG per capsule Take 1 capsule by mouth daily.  terazosin (HYTRIN) 2 MG capsule Take 5 mg by mouth nightly       hydrochlorothiazide (HYDRODIURIL) 25 MG tablet Take 25 mg by mouth daily.  pravastatin (PRAVACHOL) 20 MG tablet Take 20 mg by mouth nightly       aspirin 81 MG EC tablet Take 81 mg by mouth daily.  balsalazide (COLAZAL) 750 MG capsule Take 750 mg by mouth 3 times daily.  Testosterone (ANDROGEL TD) Place  onto the skin daily. pump      Cholecalciferol (VITAMIN D PO) Take 2,000 Units by mouth Daily with lunch       insulin glargine (LANTUS) 100 UNIT/ML injection Inject 32 Units into the skin every morning       POTASSIUM CHLORIDE PO Take 20 mEq by mouth nightly      ZINC OXIDE, TOPICAL, 10 % CREA Apply topically 4 times daily as needed      insulin aspart protamine-insulin aspart (NOVOLOG MIX 70/30 FLEXPEN) (70-30) 100 UNIT/ML injection Inject into the skin as needed Sliding scale as needed        No current facility-administered medications for this visit.          Allergies   Allergen Reactions    Sulfa Antibiotics        Vitals:    21 1222   BP: 139/83   Pulse: 82   Resp: 16   SpO2: 100%   Weight: 186 lb 3.2 oz (84.5 kg)   Height: 6' 1\" (1.854 m)       Social History     Socioeconomic History    Marital status:      Spouse name: Not on file    Number of children: Not on file    Years of education: Not on file    Highest education level: Not on file   Occupational History    Not on file   Social Needs    Financial resource strain: Not on file    Food insecurity     Worry: Not on file     Inability: Not on file    Transportation needs     Medical: Not on file     Non-medical: Not on file   Tobacco Use    Smoking status: Former Smoker     Packs/day: 1.00     Types: Cigarettes     Start date: 1972     Quit date: 1982     Years since quittin.2    Smokeless tobacco: Never Used   Substance and Sexual Activity    Alcohol use: No    Drug use: No    Sexual activity: Never   Lifestyle    Physical activity     Days per week: Not on file     Minutes per session: Not on file    Stress: Not on file   Relationships    Social connections     Talks on phone: Not on file     Gets together: Not on file     Attends Latter-day service: Not on file     Active member of club or organization: Not on file     Attends meetings of clubs or organizations: Not on file     Relationship status: Not on file    Intimate partner violence     Fear of current or ex partner: Not on file     Emotionally abused: Not on file     Physically abused: Not on file     Forced sexual activity: Not on file   Other Topics Concern    Not on file   Social History Narrative    Not on file       Family History   Problem Relation Age of Onset    Cancer Mother          SUBJECTIVE: Harini Robison presents to the office today for re-evaluation of cardiac diagnoses and hfu after COVID pneumonia admission 2/2021 - I reviewed notes - no cardiology involvement, CT scan no chf . He complains of no cardiac issues but is still recovering from pneumonia. His UC still active with GIB 4 weeks ago. Undergoing Entivio injections per [facility] adjusting meds. He  denies   chest pain, chest pressure/discomfort, claudication, dyspnea, exertional chest pressure/discomfort, fatigue, irregular heart beat, lower extremity edema, near-syncope, orthopnea, palpitations, paroxysmal nocturnal dyspnea, syncope and tachypnea. Sees [facility] for UC and dr Suzan Darling for renal issues/htn        Review of Systems:   Heart: as above   Lungs: as above   Eyes: denies changes in vision or discharge. Ears: denies changes in hearing or pain. Nose: denies epistaxis or masses   Throat: denies sore throat or trouble swallowing. Neuro: denies numbness, tingling, tremors. Skin: denies rashes or itching.    : denies hematuria, dysuria   GI: denies vomiting, diarrhea   Psych: denies mood changed, anxiety, depression. all others negative. Physical Exam   /83   Pulse 82   Resp 16   Ht 6' 1\" (1.854 m)   Wt 186 lb 3.2 oz (84.5 kg)   SpO2 100%   BMI 24.57 kg/m²   Constitutional: Oriented to person, place, and time. Well-developed and well-nourished. No distress. Head: Normocephalic and atraumatic. Eyes: EOM are normal. Pupils are equal, round, and reactive to light. Neck: Normal range of motion. Neck supple. No hepatojugular reflux and no JVD present. Carotid bruit is not present. No tracheal deviation present. No thyromegaly present. Cardiovascular: Normal rate, irregular rhythm, normal heart sounds and intact distal pulses. Exam reveals no gallop and no friction rub. No murmur heard. Pulmonary/Chest: Effort normal and breath sounds normal. No respiratory distress. No wheezes. No rales. No tenderness. Abdominal: Soft. Bowel sounds are normal. No distension and no mass. No tenderness. No rebound and no guarding. Musculoskeletal: Normal range of motion. No edema and no tenderness. Neurological: Alert and oriented to person, place, and time. Skin: Skin is warm and dry. No rash noted. Not diaphoretic. No erythema. Psychiatric: Normal mood and affect.  Behavior is normal.     EKG:  Atrial fibrillation with CVR, rate 82, axis +63, otherwise normal     ASSESSMENT AND PLAN:  Patient Active Problem List   Diagnosis    Coronary atherosclerosis of native coronary artery    Hypertension    Hyperlipidemia    Diabetes mellitus ([facility]    Long standing persistent atrial fibrillation     Patient with long standing persistent, asymptomatic atrial fibrillation with CVR -  non valvular  Elevated CHADS-VASC score would normally indicate [address], but with recent GIBs there exists  A strong contraindication at this time  No sign of chf or new ischemic changes on ekg    OV 6 months      Silvio Cruz M.D  [facility]

## 2021-05-18 ENCOUNTER — TELEPHONE (OUTPATIENT)
Dept: CARDIOLOGY CLINIC | Age: 66
End: 2021-05-18

## 2021-05-18 NOTE — TELEPHONE ENCOUNTER
Patient called in and stated he spoke with his GI physician and he said it was ok to restart on blood thinners. Please advise on what he should be taking.

## 2021-10-05 ENCOUNTER — OFFICE VISIT (OUTPATIENT)
Dept: CARDIOLOGY CLINIC | Age: 66
End: 2021-10-05
Payer: MEDICARE

## 2021-10-05 VITALS
BODY MASS INDEX: 24.31 KG/M2 | HEART RATE: 73 BPM | RESPIRATION RATE: 16 BRPM | DIASTOLIC BLOOD PRESSURE: 79 MMHG | SYSTOLIC BLOOD PRESSURE: 121 MMHG | WEIGHT: 183.4 LBS | HEIGHT: 73 IN

## 2021-10-05 DIAGNOSIS — I25.10 ATHEROSCLEROSIS OF NATIVE CORONARY ARTERY OF NATIVE HEART WITHOUT ANGINA PECTORIS: Primary | ICD-10-CM

## 2021-10-05 DIAGNOSIS — I48.11 LONGSTANDING PERSISTENT ATRIAL FIBRILLATION (HCC): ICD-10-CM

## 2021-10-05 PROCEDURE — 93000 ELECTROCARDIOGRAM COMPLETE: CPT | Performed by: INTERNAL MEDICINE

## 2021-10-05 PROCEDURE — 99213 OFFICE O/P EST LOW 20 MIN: CPT | Performed by: INTERNAL MEDICINE

## 2021-10-05 NOTE — PROGRESS NOTES
Chief Complaint   Patient presents with    Atrial Fibrillation    Coronary Artery Disease       Patient Active Problem List    Diagnosis Date Noted    Thrombocytopenia ([facility]     COVID-19 02/06/2021    History of echocardiogram 10/01/2020     Overview Note:     A. Echo 8/17/2020: EF 55%, mild LVH, mild MR      [facility] 07/16/2020     Overview Note:     A. CHADS-VASC=4      Drug-induced pancytopenia ([facility] 04/02/2012    Coronary atherosclerosis of native coronary artery 03/07/2012     Overview Note:       A. Cardiac catheterization, 4/15/05. Moderate coronary atherosclerosis in RCA, circumflex and LAD arteries. Normal LV systolic function. LVEF 65%. B. Exercise Myoview 4/21/08.  13 METS, no chest pain, normal ECG, normal scan.  Hypertension     Hyperlipidemia      Overview Note:       C. Possible LFT abnormality with Lipitor. Myalgias with crestor      Diabetes mellitus ([facility]     Adrenal adenoma 07/06/2011    Testicular hypofunction 07/06/2011    Ulcerative colitis ([facility] 06/17/2011     Overview Note:     Last Assessment & Plan:   Continue with medical management  Appreciate ongoing GI input  No surgical indication at this time         Current Outpatient Medications   Medication Sig Dispense Refill    apixaban (ELIQUIS) 5 MG TABS tablet Take by mouth 2 times daily      vedolizumab (ENTYVIO) 300 MG injection 300mg Intravenous every 4 weeks.       HUMALOG KWIKPEN 100 UNIT/ML SOPN       terazosin (HYTRIN) 2 MG capsule Take 7 mg by mouth every morning      POTASSIUM CHLORIDE PO Take 40 mEq by mouth every morning      POTASSIUM CHLORIDE PO Take 20 mEq by mouth nightly      Turmeric 500 MG CAPS Take 500 mg by mouth every morning       dicyclomine (BENTYL) 10 MG capsule Take 10 mg by mouth 4 times daily (before meals and nightly)      metoprolol succinate (TOPROL XL) 50 MG extended release tablet Take 50 mg by mouth daily       CIALIS 20 MG tablet Take 20 mg by mouth as needed       insulin aspart protamine-insulin aspart (NOVOLOG MIX 70/30 FLEXPEN) (70-30) 100 UNIT/ML injection Inject into the skin as needed Sliding scale as needed       amlodipine-benazepril (LOTREL) 10-20 MG per capsule Take 1 capsule by mouth daily.  terazosin (HYTRIN) 2 MG capsule Take 5 mg by mouth nightly       hydrochlorothiazide (HYDRODIURIL) 25 MG tablet Take 25 mg by mouth daily.  pravastatin (PRAVACHOL) 20 MG tablet Take 20 mg by mouth nightly       balsalazide (COLAZAL) 750 MG capsule Take 750 mg by mouth 3 times daily.  Testosterone (ANDROGEL TD) Place  onto the skin daily. pump      Cholecalciferol (VITAMIN D PO) Take 2,000 Units by mouth Daily with lunch       insulin glargine (LANTUS) 100 UNIT/ML injection Inject 32 Units into the skin every morning        No current facility-administered medications for this visit.         Allergies   Allergen Reactions    Sulfa Antibiotics        Vitals:    10/05/21 0839   BP: 121/79   Pulse: 73   Resp: 16   Weight: 183 lb 6.4 oz (83.2 kg)   Height: 6' 1\" (1.854 m)       Social History     Socioeconomic History    Marital status:      Spouse name: Not on file    Number of children: Not on file    Years of education: Not on file    Highest education level: Not on file   Occupational History    Not on file   Tobacco Use    Smoking status: Former Smoker     Packs/day: 1.00     Types: Cigarettes     Start date: 1972     Quit date: 1982     Years since quittin.7    Smokeless tobacco: Never Used   Vaping Use    Vaping Use: Never used   Substance and Sexual Activity    Alcohol use: Yes     Comment: occasionally    Drug use: No    Sexual activity: Never   Other Topics Concern    Not on file   Social History Narrative    Not on file     Social Determinants of Health     Financial Resource Strain:     Difficulty of Paying Living Expenses:    Food Insecurity:     Worried About Running Out of Food in the Last Year:    [address] in the Last Year:    Transportation Needs:     Lack of Transportation (Medical):  Lack of Transportation (Non-Medical):    Physical Activity:     Days of Exercise per Week:     Minutes of Exercise per Session:    Stress:     Feeling of Stress :    Social Connections:     Frequency of Communication with Friends and Family:     Frequency of Social Gatherings with Friends and Family:     Attends Anabaptism Services:     Active Member of Clubs or Organizations:     Attends Club or Organization Meetings:     Marital Status:    Intimate Partner Violence:     Fear of Current or Ex-Partner:     Emotionally Abused:     Physically Abused:     Sexually Abused:        Family History   Problem Relation Age of Onset    Cancer Mother          SUBJECTIVE: Matheus Llanos presents to the office today for re-evaluation of cardiac diagnoses . He complains of no cardiac issues  And is back on NOAC after NO GIB for some time on Entyvio. Undergoing Entivio injections per [facility] adjusting meds. He  denies   chest pain, chest pressure/discomfort, claudication, dyspnea, exertional chest pressure/discomfort, fatigue, irregular heart beat, lower extremity edema, near-syncope, orthopnea, palpitations, paroxysmal nocturnal dyspnea, syncope and tachypnea. Sees [facility] for UC and dr Mendez Justin for renal issues/htn        Review of Systems:   Heart: as above   Lungs: as above   Eyes: denies changes in vision or discharge. Ears: denies changes in hearing or pain. Nose: denies epistaxis or masses   Throat: denies sore throat or trouble swallowing. Neuro: denies numbness, tingling, tremors. Skin: denies rashes or itching. : denies hematuria, dysuria   GI: denies vomiting, diarrhea   Psych: denies mood changed, anxiety, depression. all others negative.     Physical Exam   /79   Pulse 73   Resp 16   Ht 6' 1\" (1.854 m)   Wt 183 lb 6.4 oz (83.2 kg)   BMI 24.20 kg/m² Constitutional: Oriented to person, place, and time. Well-developed and well-nourished. No distress. Head: Normocephalic and atraumatic. Eyes: EOM are normal. Pupils are equal, round, and reactive to light. Neck: Normal range of motion. Neck supple. No hepatojugular reflux and no JVD present. Carotid bruit is not present. No tracheal deviation present. No thyromegaly present. Cardiovascular: Normal rate, irregular rhythm, normal heart sounds and intact distal pulses. Exam reveals no gallop and no friction rub. No murmur heard. Pulmonary/Chest: Effort normal and breath sounds normal. No respiratory distress. No wheezes. No rales. No tenderness. Abdominal: Soft. Bowel sounds are normal. No distension and no mass. No tenderness. No rebound and no guarding. Musculoskeletal: Normal range of motion. No edema and no tenderness. Neurological: Alert and oriented to person, place, and time. Skin: Skin is warm and dry. No rash noted. Not diaphoretic. No erythema. Psychiatric: Normal mood and affect.  Behavior is normal.     EKG:  Atrial fibrillation with CVR, rate 73, axis +63, otherwise normal     ASSESSMENT AND PLAN:  Patient Active Problem List   Diagnosis    Coronary atherosclerosis of native coronary artery    Hypertension    Hyperlipidemia    Diabetes mellitus ([address]    Long standing persistent atrial fibrillation     Patient with long standing persistent, asymptomatic atrial fibrillation with CVR -  non valvular  Elevated CHADS-VASC score - no recent GIBs - back on NOAC  No sign of chf or new ischemic changes on ekg    OV 1 year      Kaye Harper M.D  [facility]

## 2021-12-27 RX ORDER — APIXABAN 5 MG/1
TABLET, FILM COATED ORAL
Qty: 180 TABLET | Refills: 3 | Status: SHIPPED
Start: 2021-12-27 | End: 2022-03-28 | Stop reason: SDUPTHER

## 2022-04-25 ENCOUNTER — APPOINTMENT (OUTPATIENT)
Dept: CT IMAGING | Age: 67
End: 2022-04-25
Payer: MEDICARE

## 2022-04-25 ENCOUNTER — APPOINTMENT (OUTPATIENT)
Dept: GENERAL RADIOLOGY | Age: 67
End: 2022-04-25
Payer: MEDICARE

## 2022-04-25 ENCOUNTER — HOSPITAL ENCOUNTER (EMERGENCY)
Age: 67
Discharge: HOME | End: 2022-04-25
Attending: STUDENT IN AN ORGANIZED HEALTH CARE EDUCATION/TRAINING PROGRAM
Payer: MEDICARE

## 2022-04-25 VITALS
DIASTOLIC BLOOD PRESSURE: 71 MMHG | OXYGEN SATURATION: 100 % | BODY MASS INDEX: 24.12 KG/M2 | SYSTOLIC BLOOD PRESSURE: 178 MMHG | TEMPERATURE: 98.1 F | HEART RATE: 103 BPM | HEIGHT: 73 IN | RESPIRATION RATE: 16 BRPM | WEIGHT: 182 LBS

## 2022-04-25 DIAGNOSIS — S60.222A CONTUSION OF LEFT HAND, INITIAL ENCOUNTER: ICD-10-CM

## 2022-04-25 DIAGNOSIS — S09.90XA CLOSED HEAD INJURY, INITIAL ENCOUNTER: Primary | ICD-10-CM

## 2022-04-25 LAB
ALBUMIN SERPL-MCNC: 5.1 G/DL (ref 3.5–5.2)
ALP BLD-CCNC: 120 U/L (ref 40–129)
ALT SERPL-CCNC: 13 U/L (ref 0–40)
ANION GAP SERPL CALCULATED.3IONS-SCNC: 10 MMOL/L (ref 7–16)
AST SERPL-CCNC: 24 U/L (ref 0–39)
BASOPHILS ABSOLUTE: 0.04 E9/L (ref 0–0.2)
BASOPHILS RELATIVE PERCENT: 0.5 % (ref 0–2)
BILIRUB SERPL-MCNC: 0.6 MG/DL (ref 0–1.2)
BUN BLDV-MCNC: 17 MG/DL (ref 6–23)
CALCIUM SERPL-MCNC: 9.7 MG/DL (ref 8.6–10.2)
CHLORIDE BLD-SCNC: 103 MMOL/L (ref 98–107)
CO2: 28 MMOL/L (ref 22–29)
CREAT SERPL-MCNC: 0.9 MG/DL (ref 0.7–1.2)
EOSINOPHILS ABSOLUTE: 0.08 E9/L (ref 0.05–0.5)
EOSINOPHILS RELATIVE PERCENT: 1 % (ref 0–6)
GFR AFRICAN AMERICAN: >60
GFR NON-AFRICAN AMERICAN: >60 ML/MIN/1.73
GLUCOSE BLD-MCNC: 153 MG/DL (ref 74–99)
HCT VFR BLD CALC: 39 % (ref 37–54)
HEMOGLOBIN: 13.3 G/DL (ref 12.5–16.5)
IMMATURE GRANULOCYTES #: 0.02 E9/L
IMMATURE GRANULOCYTES %: 0.2 % (ref 0–5)
LYMPHOCYTES ABSOLUTE: 0.88 E9/L (ref 1.5–4)
LYMPHOCYTES RELATIVE PERCENT: 10.6 % (ref 20–42)
MCH RBC QN AUTO: 29.4 PG (ref 26–35)
MCHC RBC AUTO-ENTMCNC: 34.1 % (ref 32–34.5)
MCV RBC AUTO: 86.3 FL (ref 80–99.9)
MONOCYTES ABSOLUTE: 0.49 E9/L (ref 0.1–0.95)
MONOCYTES RELATIVE PERCENT: 5.9 % (ref 2–12)
NEUTROPHILS ABSOLUTE: 6.8 E9/L (ref 1.8–7.3)
NEUTROPHILS RELATIVE PERCENT: 81.8 % (ref 43–80)
PDW BLD-RTO: 14 FL (ref 11.5–15)
PLATELET # BLD: 144 E9/L (ref 130–450)
PMV BLD AUTO: 10.1 FL (ref 7–12)
POTASSIUM SERPL-SCNC: 3.8 MMOL/L (ref 3.5–5)
RBC # BLD: 4.52 E12/L (ref 3.8–5.8)
SODIUM BLD-SCNC: 141 MMOL/L (ref 132–146)
TOTAL PROTEIN: 7.7 G/DL (ref 6.4–8.3)
TROPONIN, HIGH SENSITIVITY: 13 NG/L (ref 0–11)
WBC # BLD: 8.3 E9/L (ref 4.5–11.5)

## 2022-04-25 PROCEDURE — 93005 ELECTROCARDIOGRAM TRACING: CPT | Performed by: PHYSICIAN ASSISTANT

## 2022-04-25 PROCEDURE — 6360000002 HC RX W HCPCS: Performed by: STUDENT IN AN ORGANIZED HEALTH CARE EDUCATION/TRAINING PROGRAM

## 2022-04-25 PROCEDURE — 71046 X-RAY EXAM CHEST 2 VIEWS: CPT

## 2022-04-25 PROCEDURE — 85025 COMPLETE CBC W/AUTO DIFF WBC: CPT

## 2022-04-25 PROCEDURE — 84484 ASSAY OF TROPONIN QUANT: CPT

## 2022-04-25 PROCEDURE — 70486 CT MAXILLOFACIAL W/O DYE: CPT

## 2022-04-25 PROCEDURE — 73130 X-RAY EXAM OF HAND: CPT

## 2022-04-25 PROCEDURE — 90471 IMMUNIZATION ADMIN: CPT | Performed by: STUDENT IN AN ORGANIZED HEALTH CARE EDUCATION/TRAINING PROGRAM

## 2022-04-25 PROCEDURE — 72125 CT NECK SPINE W/O DYE: CPT

## 2022-04-25 PROCEDURE — 90714 TD VACC NO PRESV 7 YRS+ IM: CPT | Performed by: STUDENT IN AN ORGANIZED HEALTH CARE EDUCATION/TRAINING PROGRAM

## 2022-04-25 PROCEDURE — 80053 COMPREHEN METABOLIC PANEL: CPT

## 2022-04-25 PROCEDURE — 70450 CT HEAD/BRAIN W/O DYE: CPT

## 2022-04-25 PROCEDURE — 99285 EMERGENCY DEPT VISIT HI MDM: CPT

## 2022-04-25 RX ORDER — TETANUS AND DIPHTHERIA TOXOIDS ADSORBED 2; 2 [LF]/.5ML; [LF]/.5ML
0.5 INJECTION INTRAMUSCULAR ONCE
Status: COMPLETED | OUTPATIENT
Start: 2022-04-25 | End: 2022-04-25

## 2022-04-25 RX ADMIN — TETANUS AND DIPHTHERIA TOXOIDS ADSORBED 0.5 ML: 2; 2 INJECTION INTRAMUSCULAR at 21:40

## 2022-04-25 NOTE — ED NOTES
Department of Emergency Medicine  FIRST PROVIDER TRIAGE NOTE             Independent MLP           4/25/22  3:11 PM EDT    Date of Encounter: 4/25/22   MRN: [id number]      HPI: Parminder Zhang is a 74 y.o. male who presents to the ED for Loss of Consciousness (hit head, LOC, on eliquis), Hand Pain (left hand pain), and Head Injury    For syncopal episode prior to arrival.  Patient states he was walking to his truck and got dizzy and had a syncopal event. Patient states he hit his head and hurt his left hand. Patient takes Eliquis. ROS: Negative for cp or sob. PE: Gen Appearance/Constitutional: alert  HEENT: NC/NT. PERRLA,  Airway patent. Initial Plan of Care: All treatment areas with department are currently occupied. Plan to order/Initiate the following while awaiting opening in ED: labs, EKG and imaging studies.   Initiate Treatment-Testing, Proceed toTreatment Area When Bed Available for ED Attending/MLP to Continue Care    Electronically signed by Roseline Asencio PA-C   DD: 4/25/22       Roseline Asencio PA-C  04/25/22 1516

## 2022-04-26 LAB
EKG ATRIAL RATE: 79 BPM
EKG Q-T INTERVAL: 358 MS
EKG QRS DURATION: 104 MS
EKG QTC CALCULATION (BAZETT): 440 MS
EKG R AXIS: 71 DEGREES
EKG T AXIS: 58 DEGREES
EKG VENTRICULAR RATE: 91 BPM

## 2022-04-26 PROCEDURE — 93010 ELECTROCARDIOGRAM REPORT: CPT | Performed by: INTERNAL MEDICINE

## 2022-04-26 NOTE — ED PROVIDER NOTES
ED  Provider Note  Admit Date/RoomTime: 4/25/2022  8:26 PM  ED Room: 06/06      History of Present Illness:  4/25/22, Time: 8:29 PM EDT  Chief Complaint   Patient presents with    Loss of Consciousness     hit head, LOC, on eliquis    Hand Pain     left hand pain    Head Injury         Nicci Angeles is a 74 y.o. male presenting to the ED for fall with LOC. 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 Had just stood up after moving chainsaw. Dizziness then fall with LOC. Reports likely dehydrated. Awoke himself and got up and ambulated WOD. Diaphoretic. Feels fine. L hand pain and swelling. No numbness or tingling. No vision changes. No CP or sob, no arm jaw or back pain  Onset: sudden   Timing:  Persistent   Duration: seconds to minutse   Associated symptoms: no focal weakness or numbness. No other pain complaints  Severity: mild to mod   Exacerbated by: none   Relieved by: none         Review of Systems:   A complete review of systems was performed and pertinent positives and negatives are stated within HPI, all other systems reviewed and are negative.        --------------------------------------------- PATIENT HISTORY--------------------------------------------  Past Medical History:  has a past medical history of Coronary atherosclerosis of native coronary artery, Diabetes mellitus ([facility] Hyperlipidemia, Hypertension, and Ulcerative colitis ([facility] Past Surgical History:  has a past surgical history that includes Diagnostic Cardiac Cath Lab Procedure and sigmoidoscopy. Family History: family history includes Cancer in his mother. . Unless otherwise noted, family history is non contributory    Social History:  reports that he quit smoking about 40 years ago. His smoking use included cigarettes. He started smoking about 50 years ago. He smoked 1.00 pack per day. He has never used smokeless tobacco. He reports current alcohol use. He reports that he does not use drugs.     The patients home medications have been CBC with Auto Differential   Result Value Ref Range    WBC 8.3 4.5 - 11.5 E9/L    RBC 4.52 3.80 - 5.80 E12/L    Hemoglobin 13.3 12.5 - 16.5 g/dL    Hematocrit 39.0 37.0 - 54.0 %    MCV 86.3 80.0 - 99.9 fL    MCH 29.4 26.0 - 35.0 pg    MCHC 34.1 32.0 - 34.5 %    RDW 14.0 11.5 - 15.0 fL    Platelets 011 713 - 947 E9/L    MPV 10.1 7.0 - 12.0 fL    Neutrophils % 81.8 (H) 43.0 - 80.0 %    Immature Granulocytes % 0.2 0.0 - 5.0 %    Lymphocytes % 10.6 (L) 20.0 - 42.0 %    Monocytes % 5.9 2.0 - 12.0 %    Eosinophils % 1.0 0.0 - 6.0 %    Basophils % 0.5 0.0 - 2.0 %    Neutrophils Absolute 6.80 1.80 - 7.30 E9/L    Immature Granulocytes # 0.02 E9/L    Lymphocytes Absolute 0.88 (L) 1.50 - 4.00 E9/L    Monocytes Absolute 0.49 0.10 - 0.95 E9/L    Eosinophils Absolute 0.08 0.05 - 0.50 E9/L    Basophils Absolute 0.04 0.00 - 0.20 E9/L   Comprehensive Metabolic Panel   Result Value Ref Range    Sodium 141 132 - 146 mmol/L    Potassium 3.8 3.5 - 5.0 mmol/L    Chloride 103 98 - 107 mmol/L    CO2 28 22 - 29 mmol/L    Anion Gap 10 7 - 16 mmol/L    Glucose 153 (H) 74 - 99 mg/dL    BUN 17 6 - 23 mg/dL    CREATININE 0.9 0.7 - 1.2 mg/dL    GFR Non-African American >60 >=60 mL/min/1.73    GFR African American >60     Calcium 9.7 8.6 - 10.2 mg/dL    Total Protein 7.7 6.4 - 8.3 g/dL    Albumin 5.1 3.5 - 5.2 g/dL    Total Bilirubin 0.6 0.0 - 1.2 mg/dL    Alkaline Phosphatase 120 40 - 129 U/L    ALT 13 0 - 40 U/L    AST 24 0 - 39 U/L   Troponin   Result Value Ref Range    Troponin, High Sensitivity 13 (H) 0 - 11 ng/L   EKG 12 Lead   Result Value Ref Range    Ventricular Rate 91 BPM    Atrial Rate 79 BPM    QRS Duration 104 ms    Q-T Interval 358 ms    QTc Calculation (Bazett) 440 ms    R Axis 71 degrees    T Axis 58 degrees   ,       RADIOLOGY:  Imaging interpreted by Radiologist unless otherwise specified  CT HEAD WO CONTRAST   Final Result   No acute intracranial abnormality or hemorrhage. Mild periventricular leukomalacia.       Mild sinus disease as noted. CT FACIAL BONES WO CONTRAST   Final Result   No acute facial bone trauma. Mild left supraorbital soft tissue swelling. CT CERVICAL SPINE WO CONTRAST   Final Result   No acute abnormality of the cervical spine. Multilevel hypertrophic degenerative changes. XR CHEST (2 VW)   Final Result   No acute process. XR HAND LEFT (MIN 3 VIEWS)   Final Result   No acute osseous abnormality.             ------------------------- NURSING NOTES AND VITALS REVIEWED ---------------------------  The nursing notes within the ED encounter and vital signs as below have been reviewed by myself  BP (!) 178/71   Pulse 103   Temp 98.1 °F (36.7 °C)   Resp 16   Ht 6' 1\" (1.854 m)   Wt 182 lb (82.6 kg)   SpO2 100%   BMI 24.01 kg/m²      The patients available past medical records and past encounters were reviewed. ------------------------------ ED COURSE/MEDICAL DECISION MAKING----------------------  Medications   diptheria-tetanus toxoids [facility]) 2-2 LF/0.5ML injection 0.5 mL (0.5 mLs IntraMUSCular Given 4/25/22 2140)       I, Dr. Javier Vega, am the primary provider of record    Medical Decision Making:   Orthostasis vs vasovagal syncope likely. Discussed hydration and differential that includes TIA but patient story does not fit with this and NV intact now. He would like to pursue discharge. I think this is reasonable. Return precautions given. Tetanus updated. Stable vitals. ED Counseling: This emergency provider has spoken with the patient and any family present to discuss clinical status, results, plan of care, diagnosis and prognosis as able to be determined at this time. Any questions were answered and patient and/or family/POA are agreeable with the plan.       --------------------------------- IMPRESSION AND DISPOSITION ---------------------------------    IMPRESSION  1. Closed head injury, initial encounter    2.  Contusion of left hand, initial encounter        DISPOSITION  Disposition: Discharge to home  Patient condition is good      This report was transcribed using voice recognition software. Every effort was made to ensure accuracy; however, transcription errors may be present.          Yuridia Baker MD  04/27/22 7968

## 2022-05-03 ENCOUNTER — TELEPHONE (OUTPATIENT)
Dept: NON INVASIVE DIAGNOSTICS | Age: 67
End: 2022-05-03

## 2022-05-03 NOTE — TELEPHONE ENCOUNTER
Pt scheduled for 24 hour holter per Dr. Lawrence Mcgowan on 05/04/2022 at 10:20 AM.     Electronically signed by Mare Alvares MA on 5/3/2022 at 11:52 AM

## 2022-05-04 ENCOUNTER — NURSE ONLY (OUTPATIENT)
Dept: NON INVASIVE DIAGNOSTICS | Age: 67
End: 2022-05-04

## 2022-05-04 NOTE — PROGRESS NOTES
Patient was in today for 24 hr holter monitor per Dr Veronica Gutierres. Patient given instructions and questions answered, verbalized understanding. Monitor # [id number].       Rachell Severe, [place]

## 2022-05-18 ENCOUNTER — TELEPHONE (OUTPATIENT)
Dept: VASCULAR SURGERY | Age: 67
End: 2022-05-18

## 2022-05-19 ENCOUNTER — OFFICE VISIT (OUTPATIENT)
Dept: CARDIOLOGY CLINIC | Age: 67
End: 2022-05-19
Payer: MEDICARE

## 2022-05-19 ENCOUNTER — OFFICE VISIT (OUTPATIENT)
Dept: VASCULAR SURGERY | Age: 67
End: 2022-05-19
Payer: MEDICARE

## 2022-05-19 VITALS — HEIGHT: 73 IN | WEIGHT: 180 LBS | BODY MASS INDEX: 23.86 KG/M2

## 2022-05-19 VITALS
WEIGHT: 179 LBS | DIASTOLIC BLOOD PRESSURE: 76 MMHG | RESPIRATION RATE: 14 BRPM | SYSTOLIC BLOOD PRESSURE: 141 MMHG | HEART RATE: 63 BPM | BODY MASS INDEX: 23.72 KG/M2 | HEIGHT: 73 IN

## 2022-05-19 DIAGNOSIS — D69.6 THROMBOCYTOPENIA (HCC): Primary | ICD-10-CM

## 2022-05-19 DIAGNOSIS — I48.11 LONGSTANDING PERSISTENT ATRIAL FIBRILLATION (HCC): ICD-10-CM

## 2022-05-19 DIAGNOSIS — I65.22 LEFT CAROTID STENOSIS: ICD-10-CM

## 2022-05-19 DIAGNOSIS — I25.10 ATHEROSCLEROSIS OF NATIVE CORONARY ARTERY OF NATIVE HEART WITHOUT ANGINA PECTORIS: Primary | ICD-10-CM

## 2022-05-19 DIAGNOSIS — Z87.891 HISTORY OF TOBACCO USE: ICD-10-CM

## 2022-05-19 PROCEDURE — 99214 OFFICE O/P EST MOD 30 MIN: CPT | Performed by: INTERNAL MEDICINE

## 2022-05-19 PROCEDURE — 99204 OFFICE O/P NEW MOD 45 MIN: CPT | Performed by: SURGERY

## 2022-05-19 PROCEDURE — 93000 ELECTROCARDIOGRAM COMPLETE: CPT | Performed by: INTERNAL MEDICINE

## 2022-05-19 NOTE — PROGRESS NOTES
Chief Complaint:   Chief Complaint   Patient presents with    Consultation     new pt. Kylee has some dizziness and passed out         HPI: Patient came to the office with his wife, for the evaluation of abnormal carotid ultrasound revealed left carotid stenosis, the testing being done, after patient worked out in the yard, change, went back to the truck to put the chainsaw and turned around, and fell down because lightheadedness dizziness, not associate with any seizure disorders, etc.    Patient is known to have underlying coronary artery disease with history of atrial fibrillation, long-term anticoagulation    Patient has history of ulcerative colitis    Patient also has pancytopenia, thrombocytopenia, follows up with a specialist at the [facility], recommended him consider therapy unless platelet count drops below 70,000      Patient denies any focal lateralizing neurological symptoms like loss of speech, vision or loss of function of extremity    Patient can walk several blocks, mile at a time without difficulty, and denies any symptoms of rest pain    Allergies   Allergen Reactions    Sulfa Antibiotics        Current Outpatient Medications   Medication Sig Dispense Refill    apixaban (ELIQUIS) 5 MG TABS tablet TAKE 1 TABLET TWICE A  tablet 3    vedolizumab (ENTYVIO) 300 MG injection 300mg Intravenous every 4 weeks.       HUMALOG KWIKPEN 100 UNIT/ML SOPN       terazosin (HYTRIN) 2 MG capsule Take 7 mg by mouth every morning      POTASSIUM CHLORIDE PO Take 40 mEq by mouth every morning      POTASSIUM CHLORIDE PO Take 20 mEq by mouth nightly      Turmeric 500 MG CAPS Take 500 mg by mouth every morning       metoprolol succinate (TOPROL XL) 50 MG extended release tablet Take 50 mg by mouth daily       CIALIS 20 MG tablet Take 20 mg by mouth as needed       insulin aspart protamine-insulin aspart (NOVOLOG MIX 70/30 FLEXPEN) (70-30) 100 UNIT/ML injection Inject into the skin as needed Sliding scale as needed       amlodipine-benazepril (LOTREL) 10-20 MG per capsule Take 1 capsule by mouth daily.  terazosin (HYTRIN) 2 MG capsule Take 5 mg by mouth nightly       hydrochlorothiazide (HYDRODIURIL) 25 MG tablet Take 25 mg by mouth daily.  pravastatin (PRAVACHOL) 20 MG tablet Take 20 mg by mouth nightly       balsalazide (COLAZAL) 750 MG capsule Take 750 mg by mouth 3 times daily.  Testosterone (ANDROGEL TD) Place  onto the skin daily. pump      Cholecalciferol (VITAMIN D PO) Take 2,000 Units by mouth Daily with lunch       insulin glargine (LANTUS) 100 UNIT/ML injection Inject 32 Units into the skin every morning        No current facility-administered medications for this visit.        Past Medical History:   Diagnosis Date    SHIVAM (cerebral atherosclerosis)     Coronary atherosclerosis of native coronary artery     COVID     Diabetes mellitus (HCC)     Dizziness     History of tobacco use 2022    Hyperlipidemia     Hypertension     Left carotid stenosis 2022    [facility]ative [facility]        Past Surgical History:   Procedure Laterality Date    COLONOSCOPY      DIAGNOSTIC CARDIAC CATH LAB PROCEDURE      SIGMOIDOSCOPY         Family History   Problem Relation Age of Onset    Cancer Mother        Social History     Socioeconomic History    Marital status:      Spouse name: Not on file    Number of children: Not on file    Years of education: Not on file    Highest education level: Not on file   Occupational History    Not on file   Tobacco Use    Smoking status: Former Smoker     Packs/day: 1.00     Types: Cigarettes     Start date: 1972     Quit date: 1982     Years since quittin.4    Smokeless tobacco: Never Used   Vaping Use    Vaping Use: Never used   Substance and Sexual Activity    Alcohol use: Yes     Comment: occasionally    Drug use: No    Sexual activity: Never   Other Topics Concern    Not on file   Social History Narrative    Not on file     Social Determinants of Health     Financial Resource Strain:     Difficulty of Paying Living Expenses: Not on file   Food Insecurity:     Worried About Running Out of Food in the Last Year: Not on file    Jo-Ann of Food in the Last Year: Not on file   Transportation Needs:     Lack of Transportation (Medical): Not on file    Lack of Transportation (Non-Medical): Not on file   Physical Activity:     Days of Exercise per Week: Not on file    Minutes of Exercise per Session: Not on file   Stress:     Feeling of Stress : Not on file   Social Connections:     Frequency of Communication with Friends and Family: Not on file    Frequency of Social Gatherings with Friends and Family: Not on file    Attends Latter-[employer] Services: Not on file    Active Member of [address] [place] [address] or Organizations: Not on file    Attends Club or Organization Meetings: Not on file    Marital Status: Not on file   Intimate Partner Violence:     Fear of Current or Ex-Partner: Not on file    Emotionally Abused: Not on file    Physically Abused: Not on file    Sexually Abused: Not on file   Housing Stability:     Unable to Pay for Housing in the Last Year: Not on file    Number of [place] in the Last Year: Not on file    Unstable Housing in the Last Year: Not on file       Review of Systems:  Skin:  No abnormal pigmentation or rash  Eyes:  No blurring, diplopia or vision loss  Ears/Nose/Throat:  No hearing loss or vertigo  Respiratory:  No cough, pleuritic chest pain, dyspnea, or wheezing. Cardiovascular: No angina, palpitations . Coronary artery disease history of atrial fibrillation, hypertension, cardiac arrhythmias  Gastrointestinal:  No nausea or vomiting; no abdominal pain or rectal bleeding  Musculoskeletal:  No arthritis or weakness.   Neurologic:  No paralysis, paresis, paresthesia, seizures or headaches  Hematologic/Lymphatic/Immunologic:  No anemia, abnormal bleeding/bruising, fever, chills or night sweats. .  Pancytopenia with thrombocytopenia  Endocrine:  No heat or cold intolerance. No polyphagia, polydipsia or polyuria. Diabetes mellitus      Physical Exam:  General appearance:  Alert, awake, oriented x 3. No distress. Skin:  Warm and dry  Head:  Normocephalic. No masses, lesions or tenderness  Eyes:  Conjunctivae appear normal; PERRL  Ears:  External ears normal  Nose/Sinuses:  Septum midline, mucosa normal; no drainage  Oropharynx:  Clear, no exudate noted  Neck:  No jugular venous distention, lymphadenopathy or thyromegaly. Left carotid bruit noted  Lungs:  Clear to ausculation bilaterally. No rhonchi, crackles, wheezes  Heart:  Regular rate and rhythm. No rub or murmur  Abdomen:  Soft, non-tender. No masses, organomegaly. Musculoskeletal : No joint effusions, tenderness swelling    Neuro: Speech is intact. Moving all extremities. No focal motor or sensory deficits      Extremities:  Both feet are warm to touch. The color of both feet is normal.        Pulses Right  Left    Brachial 3 3    Radial    3=normal   Femoral 2 2  2=diminished   Popliteal    1=barely palpable   Dorsalis pedis    0=absent   Posterior tibial 2 2  4=aneurysmal             Other pertinent information:1. The past medical records were reviewed. 2.  The carotid ultrasound that was done at [facility] imaging was personally reviewed, based upon the velocities, as well as atherosclerotic, patient has a proximal 70% plus stenosis    Assessment:    1. Thrombocytopenia ([address]    2. Left carotid stenosis    3.  History of tobacco use              Plan:       Discussed needs with patient his wife, regarding my personal interpretation of the carotid ultrasound, proximal 70% plus stenosis but less than 80% stenosis, otherwise asymptomatic without any focal symptoms, patient was reassured, recommended, call immediately if any focal strokelike symptoms, clearly explained      Option of starting him on low-dose aspirin was discussed, as the patient is already on Eliquis, and has a history of thrombocytopenia and it was deferred        Patient was instructed to continue walking program and to call if any worsening of symptoms and to call if any focal lateralizing neurological symptoms like loss of speech, vision or loss of function of extremity. All the questions were answered. Indicated follow-up: Return in about 6 months (around 11/19/2022), or if symptoms worsen or fail to improve. Purse String (Simple) Text: Given the location of the defect and the characteristics of the surrounding skin a purse string closure was deemed most appropriate.  Undermining was performed circumfirentially around the surgical defect.  A purse string suture was then placed and tightened.

## 2022-05-19 NOTE — PROGRESS NOTES
Chief Complaint   Patient presents with    Atrial Fibrillation    Coronary Artery Disease       Patient Active Problem List    Diagnosis Date Noted    Left carotid stenosis 05/19/2022     Priority: Medium     Overview Note:     70% plus stenosis based upon my personal interpretation      History of tobacco use 05/19/2022     Priority: Medium    Thrombocytopenia ([facility]     COVID-19 02/06/2021    History of echocardiogram 10/01/2020     Overview Note:     A. Echo 8/17/2020: EF 55%, mild LVH, mild MR      Longstanding persistent atrial fibrillation ([facility] 07/16/2020     Overview Note:     A. CHADS-VASC=4      Coronary atherosclerosis of native coronary artery 03/07/2012     Overview Note:       A. Cardiac catheterization, 4/15/05. Moderate coronary atherosclerosis in RCA, circumflex and LAD arteries. Normal LV systolic function. LVEF 65%. B. Exercise Myoview 4/21/08.  13 METS, no chest pain, normal ECG, normal scan.  Hypertension     Hyperlipidemia      Overview Note:       C. Possible LFT abnormality with Lipitor. Myalgias with crestor      Diabetes mellitus ([facility]     Adrenal adenoma 07/06/2011    Testicular hypofunction 07/06/2011    Ulcerative colitis ([facility] 06/17/2011     Overview Note:     Last Assessment & Plan:   Continue with medical management  Appreciate ongoing GI input  No surgical indication at this time         Current Outpatient Medications   Medication Sig Dispense Refill    apixaban (ELIQUIS) 5 MG TABS tablet TAKE 1 TABLET TWICE A  tablet 3    vedolizumab (ENTYVIO) 300 MG injection 300mg Intravenous every 4 weeks.       terazosin (HYTRIN) 2 MG capsule Take 2 mg by mouth every morning       POTASSIUM CHLORIDE PO Take 40 mEq by mouth every morning      POTASSIUM CHLORIDE PO Take 20 mEq by mouth nightly      Turmeric 500 MG CAPS Take 500 mg by mouth every morning       metoprolol succinate (TOPROL XL) 50 MG extended release tablet Take 50 mg by mouth daily       CIALIS 20 MG tablet Take 20 mg by mouth as needed       amlodipine-benazepril (LOTREL) 10-20 MG per capsule Take 1 capsule by mouth daily.  terazosin (HYTRIN) 2 MG capsule Take 5 mg by mouth nightly       hydrochlorothiazide (HYDRODIURIL) 25 MG tablet Take 25 mg by mouth daily.  pravastatin (PRAVACHOL) 20 MG tablet Take 20 mg by mouth nightly       balsalazide (COLAZAL) 750 MG capsule Take 750 mg by mouth 3 times daily.  Testosterone (ANDROGEL TD) Place  onto the skin daily. pump      Cholecalciferol (VITAMIN D PO) Take 2,000 Units by mouth Daily with lunch       insulin glargine (LANTUS) 100 UNIT/ML injection Inject 22 Units into the skin every morning        No current facility-administered medications for this visit.         Allergies   Allergen Reactions    Sulfa Antibiotics        Vitals:    22 1223   BP: (!) 141/76   Pulse: 63   Resp: 14   Weight: 179 lb (81.2 kg)   Height: 6' 1\" (1.854 m)       Social History     Socioeconomic History    Marital status:      Spouse name: Not on file    Number of children: Not on file    Years of education: Not on file    Highest education level: Not on file   Occupational History    Not on file   Tobacco Use    Smoking status: Former Smoker     Packs/day: 1.00     Types: Cigarettes     Start date: 1972     Quit date: 1982     Years since quittin.4    Smokeless tobacco: Never Used   Vaping Use    Vaping Use: Never used   Substance and Sexual Activity    Alcohol use: Yes     Comment: occasionally    Drug use: No    Sexual activity: Never   Other Topics Concern    Not on file   Social History Narrative    Not on file     Social Determinants of Health     Financial Resource Strain:     Difficulty of Paying Living Expenses: Not on file   Food Insecurity:     Worried About Running Out of Food in the Last Year: Not on file    Jo-Ann of Food in the Last Year: Not on file   Transportation Needs:     Lack of Transportation (Medical): Not on file    Lack of Transportation (Non-Medical): Not on file   Physical Activity:     Days of Exercise per Week: Not on file    Minutes of Exercise per Session: Not on file   Stress:     Feeling of Stress : Not on file   Social Connections:     Frequency of Communication with Friends and Family: Not on file    Frequency of Social Gatherings with Friends and Family: Not on file    Attends [employer] Services: Not on file    Active Member of [address] [place] [address] [employer] or Organizations: Not on file    Attends Club or Organization Meetings: Not on file    Marital Status: Not on file   Intimate Partner Violence:     Fear of Current or Ex-Partner: Not on file    Emotionally Abused: Not on file    Physically Abused: Not on file    Sexually Abused: Not on file   Housing Stability:     Unable to Pay for Housing in the Last Year: Not on file    Number of [place] in the Last Year: Not on file    Unstable Housing in the Last Year: Not on file       Family History   Problem Relation Age of Onset    Cancer Mother          SUBJECTIVE: Jorge Bullock presents to the office today for re-evaluation of cardiac diagnoses and f/u from ED visit - I reviewed. He had a syncopal episode while working in the yard - felt by ED staff and patient to be due to dehydration. Had OP monitor - no issues. BCDs with CAD, but dr Heath Levine note indicates medical management only  Otherwise, he complains of no cardiac issues  And is back on NOAC after NO GIB for some time on Entyvio. Undergoing Entivio injections per [facility] adjusting meds. Mateo Pettit [facility] for UC and dr Trisha Ryder for renal issues/htn. Will be having colonoscopy upcoming        Review of Systems:   Heart: as above   Lungs: as above   Eyes: denies changes in vision or discharge. Ears: denies changes in hearing or pain. Nose: denies epistaxis or masses   Throat: denies sore throat or trouble swallowing. Neuro: denies numbness, tingling, tremors.    Skin: denies rashes or itching. : denies hematuria, dysuria   GI: denies vomiting, diarrhea   Psych: denies mood changed, anxiety, depression. all others negative. Physical Exam   BP (!) 141/76   Pulse 63   Resp 14   Ht 6' 1\" (1.854 m)   Wt 179 lb (81.2 kg)   BMI 23.62 kg/m²   Constitutional: Oriented to person, place, and time. Well-developed and well-nourished. No distress. Head: Normocephalic and atraumatic. Eyes: EOM are normal. Pupils are equal, round, and reactive to light. Neck: Normal range of motion. Neck supple. No hepatojugular reflux and no JVD present. Carotid bruit is not present. No tracheal deviation present. No thyromegaly present. Cardiovascular: Normal rate, irregular rhythm, normal heart sounds and intact distal pulses. Exam reveals no gallop and no friction rub. No murmur heard. Pulmonary/Chest: Effort normal and breath sounds normal. No respiratory distress. No wheezes. No rales. No tenderness. Abdominal: Soft. Bowel sounds are normal. No distension and no mass. No tenderness. No rebound and no guarding. Musculoskeletal: Normal range of motion. No edema and no tenderness. Neurological: Alert and oriented to person, place, and time. Skin: Skin is warm and dry. No rash noted. Not diaphoretic. No erythema. Psychiatric: Normal mood and affect. Behavior is normal.     EKG:  Atrial fibrillation with CVR, otherwise normal     ASSESSMENT AND PLAN:  Patient Active Problem List   Diagnosis    Coronary atherosclerosis of native coronary artery    Hypertension    Hyperlipidemia    Diabetes mellitus ([facility]    Long standing persistent atrial fibrillation     Patient with permanent, asymptomatic atrial fibrillation with CVR -  non valvular  Elevated CHADS-VASC score - no recent GIBs - on NOAC  No sign of chf or new ischemic changes on ekg  Recent syncopal episode vasovagal, orthostatic.   Encouraged hydration  Hold eliquis 48 hours only for upcoming colonoscopy, restart ASAP    OV 1 year      Kesha Payne M.D  [facility]

## 2022-05-31 ENCOUNTER — TELEPHONE (OUTPATIENT)
Dept: CARDIOLOGY CLINIC | Age: 67
End: 2022-05-31

## 2022-05-31 NOTE — TELEPHONE ENCOUNTER
It is just like I told him recently for the colonoscopy  Hold the med as few times as possible, for as little as possible, to lessen chance of blood clot forming  48 hours is what they are requiring

## 2022-05-31 NOTE — TELEPHONE ENCOUNTER
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   Please advise     Fax [phone]

## 2022-06-27 ENCOUNTER — TELEPHONE (OUTPATIENT)
Dept: CARDIOLOGY CLINIC | Age: 67
End: 2022-06-27

## 2022-06-27 NOTE — TELEPHONE ENCOUNTER
If his dentist will not perform the procedure unless he does so, so be it  There is no \"OK\" for that  If it can be done ON Eliquis do it on eliquis.   Dentist make the decision

## 2022-06-27 NOTE — TELEPHONE ENCOUNTER
Patient called and stated he needs an ok to stop his Eliquis for a tooth removal and a cyst in his lip x 48 hours.   Please advise

## 2022-06-28 NOTE — TELEPHONE ENCOUNTER
Left message for patient regarding the recommendations of Dr. Tam Owusu regarding having dental procedure.

## 2022-09-29 ENCOUNTER — TELEPHONE (OUTPATIENT)
Dept: CARDIOLOGY CLINIC | Age: 67
End: 2022-09-29

## 2022-09-29 NOTE — TELEPHONE ENCOUNTER
Patient called and stated that he is having a flare up of his coloitis and having some issues with rectal bleeding. He is wondering if he can back of his blood thinners for a few days to try and get this under control.   Please advise

## 2022-09-30 ENCOUNTER — APPOINTMENT (OUTPATIENT)
Dept: GENERAL RADIOLOGY | Age: 67
End: 2022-09-30
Payer: MEDICARE

## 2022-09-30 ENCOUNTER — APPOINTMENT (OUTPATIENT)
Dept: CT IMAGING | Age: 67
End: 2022-09-30
Payer: MEDICARE

## 2022-09-30 ENCOUNTER — HOSPITAL ENCOUNTER (EMERGENCY)
Age: 67
Discharge: ANOTHER HOSPITAL | End: 2022-09-30
Attending: STUDENT IN AN ORGANIZED HEALTH CARE EDUCATION/TRAINING PROGRAM
Payer: MEDICARE

## 2022-09-30 ENCOUNTER — HOSPITAL ENCOUNTER (INPATIENT)
Age: 67
LOS: 5 days | Discharge: HOME HEALTH | DRG: 038 | End: 2022-10-05
Attending: EMERGENCY MEDICINE | Admitting: INTERNAL MEDICINE
Payer: MEDICARE

## 2022-09-30 VITALS
HEART RATE: 77 BPM | HEIGHT: 73 IN | OXYGEN SATURATION: 97 % | WEIGHT: 176 LBS | BODY MASS INDEX: 23.33 KG/M2 | RESPIRATION RATE: 14 BRPM | SYSTOLIC BLOOD PRESSURE: 156 MMHG | DIASTOLIC BLOOD PRESSURE: 89 MMHG | TEMPERATURE: 98 F

## 2022-09-30 DIAGNOSIS — R29.898 RUE WEAKNESS: Primary | ICD-10-CM

## 2022-09-30 DIAGNOSIS — I65.22 LEFT CAROTID ARTERY STENOSIS: Primary | ICD-10-CM

## 2022-09-30 DIAGNOSIS — I65.09 VERTEBRAL ARTERY STENOSIS, UNSPECIFIED LATERALITY: ICD-10-CM

## 2022-09-30 DIAGNOSIS — I65.22 STENOSIS OF LEFT CAROTID ARTERY: ICD-10-CM

## 2022-09-30 DIAGNOSIS — R29.90 STROKE-LIKE SYMPTOMS: ICD-10-CM

## 2022-09-30 LAB
ALBUMIN SERPL-MCNC: 4.5 G/DL (ref 3.5–5.2)
ALP BLD-CCNC: 90 U/L (ref 40–129)
ALT SERPL-CCNC: <5 U/L (ref 0–40)
ANION GAP SERPL CALCULATED.3IONS-SCNC: 9 MMOL/L (ref 7–16)
APTT: 32 SEC (ref 24.5–35.1)
AST SERPL-CCNC: 17 U/L (ref 0–39)
BACTERIA: ABNORMAL /HPF
BASOPHILS ABSOLUTE: 0.03 E9/L (ref 0–0.2)
BASOPHILS RELATIVE PERCENT: 0.5 % (ref 0–2)
BILIRUB SERPL-MCNC: 0.6 MG/DL (ref 0–1.2)
BILIRUBIN URINE: NEGATIVE
BLOOD, URINE: NEGATIVE
BUN BLDV-MCNC: 12 MG/DL (ref 6–23)
CALCIUM SERPL-MCNC: 9.3 MG/DL (ref 8.6–10.2)
CHLORIDE BLD-SCNC: 101 MMOL/L (ref 98–107)
CLARITY: CLEAR
CO2: 27 MMOL/L (ref 22–29)
COLOR: YELLOW
CREAT SERPL-MCNC: 0.9 MG/DL (ref 0.7–1.2)
EKG ATRIAL RATE: 64 BPM
EKG Q-T INTERVAL: 382 MS
EKG QRS DURATION: 112 MS
EKG QTC CALCULATION (BAZETT): 415 MS
EKG R AXIS: 46 DEGREES
EKG T AXIS: 41 DEGREES
EKG VENTRICULAR RATE: 71 BPM
EOSINOPHILS ABSOLUTE: 0.11 E9/L (ref 0.05–0.5)
EOSINOPHILS RELATIVE PERCENT: 1.8 % (ref 0–6)
GFR AFRICAN AMERICAN: >60
GFR NON-AFRICAN AMERICAN: >60 ML/MIN/1.73
GLUCOSE BLD-MCNC: 282 MG/DL
GLUCOSE BLD-MCNC: 285 MG/DL (ref 74–99)
GLUCOSE URINE: 250 MG/DL
HCT VFR BLD CALC: 36.4 % (ref 37–54)
HEMOGLOBIN: 12.7 G/DL (ref 12.5–16.5)
IMMATURE GRANULOCYTES #: 0.02 E9/L
IMMATURE GRANULOCYTES %: 0.3 % (ref 0–5)
INR BLD: 1.2
KETONES, URINE: NEGATIVE MG/DL
LEUKOCYTE ESTERASE, URINE: NEGATIVE
LYMPHOCYTES ABSOLUTE: 0.71 E9/L (ref 1.5–4)
LYMPHOCYTES RELATIVE PERCENT: 11.6 % (ref 20–42)
MCH RBC QN AUTO: 29.7 PG (ref 26–35)
MCHC RBC AUTO-ENTMCNC: 34.9 % (ref 32–34.5)
MCV RBC AUTO: 85 FL (ref 80–99.9)
METER GLUCOSE: 284 MG/DL (ref 74–99)
MONOCYTES ABSOLUTE: 0.36 E9/L (ref 0.1–0.95)
MONOCYTES RELATIVE PERCENT: 5.9 % (ref 2–12)
NEUTROPHILS ABSOLUTE: 4.87 E9/L (ref 1.8–7.3)
NEUTROPHILS RELATIVE PERCENT: 79.9 % (ref 43–80)
NITRITE, URINE: NEGATIVE
PDW BLD-RTO: 13.3 FL (ref 11.5–15)
PH UA: 7.5 (ref 5–9)
PLATELET # BLD: 139 E9/L (ref 130–450)
PMV BLD AUTO: 9.4 FL (ref 7–12)
POTASSIUM REFLEX MAGNESIUM: 4 MMOL/L (ref 3.5–5)
PROTEIN UA: NEGATIVE MG/DL
PROTHROMBIN TIME: 13.4 SEC (ref 9.3–12.4)
RBC # BLD: 4.28 E12/L (ref 3.8–5.8)
RBC UA: ABNORMAL /HPF (ref 0–2)
SODIUM BLD-SCNC: 137 MMOL/L (ref 132–146)
SPECIFIC GRAVITY UA: 1.01 (ref 1–1.03)
TOTAL PROTEIN: 6.8 G/DL (ref 6.4–8.3)
UROBILINOGEN, URINE: 0.2 E.U./DL
WBC # BLD: 6.1 E9/L (ref 4.5–11.5)
WBC UA: ABNORMAL /HPF (ref 0–5)

## 2022-09-30 PROCEDURE — 71046 X-RAY EXAM CHEST 2 VIEWS: CPT

## 2022-09-30 PROCEDURE — 6360000004 HC RX CONTRAST MEDICATION: Performed by: RADIOLOGY

## 2022-09-30 PROCEDURE — 70498 CT ANGIOGRAPHY NECK: CPT

## 2022-09-30 PROCEDURE — 2580000003 HC RX 258: Performed by: INTERNAL MEDICINE

## 2022-09-30 PROCEDURE — 82962 GLUCOSE BLOOD TEST: CPT

## 2022-09-30 PROCEDURE — 70450 CT HEAD/BRAIN W/O DYE: CPT

## 2022-09-30 PROCEDURE — 85025 COMPLETE CBC W/AUTO DIFF WBC: CPT

## 2022-09-30 PROCEDURE — 81001 URINALYSIS AUTO W/SCOPE: CPT

## 2022-09-30 PROCEDURE — 6360000002 HC RX W HCPCS: Performed by: INTERNAL MEDICINE

## 2022-09-30 PROCEDURE — 85610 PROTHROMBIN TIME: CPT

## 2022-09-30 PROCEDURE — 99285 EMERGENCY DEPT VISIT HI MDM: CPT

## 2022-09-30 PROCEDURE — 0042T CT BRAIN PERFUSION: CPT

## 2022-09-30 PROCEDURE — 6370000000 HC RX 637 (ALT 250 FOR IP): Performed by: INTERNAL MEDICINE

## 2022-09-30 PROCEDURE — 85730 THROMBOPLASTIN TIME PARTIAL: CPT

## 2022-09-30 PROCEDURE — 70496 CT ANGIOGRAPHY HEAD: CPT

## 2022-09-30 PROCEDURE — 6370000000 HC RX 637 (ALT 250 FOR IP): Performed by: STUDENT IN AN ORGANIZED HEALTH CARE EDUCATION/TRAINING PROGRAM

## 2022-09-30 PROCEDURE — 80053 COMPREHEN METABOLIC PANEL: CPT

## 2022-09-30 PROCEDURE — 93005 ELECTROCARDIOGRAM TRACING: CPT | Performed by: PHYSICIAN ASSISTANT

## 2022-09-30 PROCEDURE — 99221 1ST HOSP IP/OBS SF/LOW 40: CPT | Performed by: RADIOLOGY

## 2022-09-30 PROCEDURE — 2060000000 HC ICU INTERMEDIATE R&B

## 2022-09-30 RX ORDER — AMLODIPINE BESYLATE 10 MG/1
10 TABLET ORAL DAILY
Status: DISCONTINUED | OUTPATIENT
Start: 2022-10-01 | End: 2022-10-05 | Stop reason: HOSPADM

## 2022-09-30 RX ORDER — INSULIN LISPRO 100 [IU]/ML
0-4 INJECTION, SOLUTION INTRAVENOUS; SUBCUTANEOUS NIGHTLY
Status: DISCONTINUED | OUTPATIENT
Start: 2022-09-30 | End: 2022-10-05 | Stop reason: HOSPADM

## 2022-09-30 RX ORDER — SODIUM CHLORIDE 0.9 % (FLUSH) 0.9 %
5-40 SYRINGE (ML) INJECTION PRN
Status: DISCONTINUED | OUTPATIENT
Start: 2022-09-30 | End: 2022-10-05 | Stop reason: HOSPADM

## 2022-09-30 RX ORDER — METOPROLOL SUCCINATE 50 MG/1
50 TABLET, EXTENDED RELEASE ORAL EVERY MORNING
Status: DISCONTINUED | OUTPATIENT
Start: 2022-10-01 | End: 2022-10-05 | Stop reason: HOSPADM

## 2022-09-30 RX ORDER — POTASSIUM CHLORIDE 20 MEQ/1
60 TABLET, EXTENDED RELEASE ORAL DAILY
COMMUNITY

## 2022-09-30 RX ORDER — SODIUM CHLORIDE 0.9 % (FLUSH) 0.9 %
5-40 SYRINGE (ML) INJECTION EVERY 12 HOURS SCHEDULED
Status: DISCONTINUED | OUTPATIENT
Start: 2022-09-30 | End: 2022-10-05 | Stop reason: HOSPADM

## 2022-09-30 RX ORDER — ZINC OXIDE 13 %
1 CREAM (GRAM) TOPICAL NIGHTLY
COMMUNITY

## 2022-09-30 RX ORDER — SODIUM CHLORIDE 9 MG/ML
INJECTION, SOLUTION INTRAVENOUS PRN
Status: DISCONTINUED | OUTPATIENT
Start: 2022-09-30 | End: 2022-10-05 | Stop reason: HOSPADM

## 2022-09-30 RX ORDER — POLYETHYLENE GLYCOL 3350 17 G/17G
17 POWDER, FOR SOLUTION ORAL DAILY PRN
Status: DISCONTINUED | OUTPATIENT
Start: 2022-09-30 | End: 2022-10-05 | Stop reason: HOSPADM

## 2022-09-30 RX ORDER — ONDANSETRON 4 MG/1
4 TABLET, ORALLY DISINTEGRATING ORAL EVERY 8 HOURS PRN
Status: DISCONTINUED | OUTPATIENT
Start: 2022-09-30 | End: 2022-10-05 | Stop reason: HOSPADM

## 2022-09-30 RX ORDER — HYDROCORTISONE ACETATE 25 MG/1
25 SUPPOSITORY RECTAL 2 TIMES DAILY
Status: DISCONTINUED | OUTPATIENT
Start: 2022-09-30 | End: 2022-10-04

## 2022-09-30 RX ORDER — DOXAZOSIN MESYLATE 4 MG/1
4 TABLET ORAL NIGHTLY
Status: DISCONTINUED | OUTPATIENT
Start: 2022-09-30 | End: 2022-10-05 | Stop reason: HOSPADM

## 2022-09-30 RX ORDER — CHOLECALCIFEROL (VITAMIN D3) 125 MCG
2000-6000 CAPSULE ORAL DAILY
COMMUNITY

## 2022-09-30 RX ORDER — AMLODIPINE BESYLATE AND BENAZEPRIL HYDROCHLORIDE 10; 20 MG/1; MG/1
1 CAPSULE ORAL EVERY MORNING
Status: DISCONTINUED | OUTPATIENT
Start: 2022-10-01 | End: 2022-09-30 | Stop reason: ALTCHOICE

## 2022-09-30 RX ORDER — ASPIRIN 81 MG/1
81 TABLET ORAL DAILY
Status: DISCONTINUED | OUTPATIENT
Start: 2022-09-30 | End: 2022-10-05 | Stop reason: HOSPADM

## 2022-09-30 RX ORDER — LISINOPRIL 20 MG/1
20 TABLET ORAL DAILY
Status: DISCONTINUED | OUTPATIENT
Start: 2022-10-01 | End: 2022-10-05 | Stop reason: HOSPADM

## 2022-09-30 RX ORDER — PRAVASTATIN SODIUM 20 MG
20 TABLET ORAL NIGHTLY
Status: DISCONTINUED | OUTPATIENT
Start: 2022-09-30 | End: 2022-10-05 | Stop reason: HOSPADM

## 2022-09-30 RX ORDER — TERAZOSIN 1 MG/1
5 CAPSULE ORAL NIGHTLY
Status: DISCONTINUED | OUTPATIENT
Start: 2022-09-30 | End: 2022-09-30 | Stop reason: CLARIF

## 2022-09-30 RX ORDER — INSULIN GLARGINE-YFGN 100 [IU]/ML
26 INJECTION, SOLUTION SUBCUTANEOUS EVERY MORNING
Status: DISCONTINUED | OUTPATIENT
Start: 2022-10-01 | End: 2022-10-05 | Stop reason: HOSPADM

## 2022-09-30 RX ORDER — ACETAMINOPHEN 650 MG/1
650 SUPPOSITORY RECTAL EVERY 6 HOURS PRN
Status: DISCONTINUED | OUTPATIENT
Start: 2022-09-30 | End: 2022-10-05 | Stop reason: HOSPADM

## 2022-09-30 RX ORDER — ACETAMINOPHEN 325 MG/1
650 TABLET ORAL EVERY 6 HOURS PRN
Status: DISCONTINUED | OUTPATIENT
Start: 2022-09-30 | End: 2022-10-05 | Stop reason: HOSPADM

## 2022-09-30 RX ORDER — BALSALAZIDE DISODIUM 750 MG/1
2250 CAPSULE ORAL 3 TIMES DAILY
Status: DISCONTINUED | OUTPATIENT
Start: 2022-10-01 | End: 2022-10-05 | Stop reason: HOSPADM

## 2022-09-30 RX ORDER — ENOXAPARIN SODIUM 100 MG/ML
1 INJECTION SUBCUTANEOUS 2 TIMES DAILY
Status: COMPLETED | OUTPATIENT
Start: 2022-09-30 | End: 2022-10-02

## 2022-09-30 RX ORDER — ONDANSETRON 2 MG/ML
4 INJECTION INTRAMUSCULAR; INTRAVENOUS EVERY 6 HOURS PRN
Status: DISCONTINUED | OUTPATIENT
Start: 2022-09-30 | End: 2022-10-05 | Stop reason: HOSPADM

## 2022-09-30 RX ORDER — HYDROCORTISONE ACETATE 25 MG/1
25 SUPPOSITORY RECTAL 2 TIMES DAILY
Status: ON HOLD | COMMUNITY
End: 2022-10-05 | Stop reason: HOSPADM

## 2022-09-30 RX ORDER — INSULIN LISPRO 100 [IU]/ML
0-8 INJECTION, SOLUTION INTRAVENOUS; SUBCUTANEOUS
Status: DISCONTINUED | OUTPATIENT
Start: 2022-10-01 | End: 2022-10-05 | Stop reason: HOSPADM

## 2022-09-30 RX ADMIN — DOXAZOSIN 4 MG: 4 TABLET ORAL at 23:32

## 2022-09-30 RX ADMIN — IOPAMIDOL 100 ML: 755 INJECTION, SOLUTION INTRAVENOUS at 10:57

## 2022-09-30 RX ADMIN — HYDROCORTISONE ACETATE 25 MG: 25 SUPPOSITORY RECTAL at 23:32

## 2022-09-30 RX ADMIN — ENOXAPARIN SODIUM 80 MG: 100 INJECTION SUBCUTANEOUS at 23:31

## 2022-09-30 RX ADMIN — PRAVASTATIN SODIUM 20 MG: 20 TABLET ORAL at 23:32

## 2022-09-30 RX ADMIN — ASPIRIN 81 MG: 81 TABLET, COATED ORAL at 16:44

## 2022-09-30 RX ADMIN — SODIUM CHLORIDE, PRESERVATIVE FREE 10 ML: 5 INJECTION INTRAVENOUS at 23:32

## 2022-09-30 ASSESSMENT — ENCOUNTER SYMPTOMS
BACK PAIN: 0
NAUSEA: 0
RHINORRHEA: 0
SORE THROAT: 0
SHORTNESS OF BREATH: 0
ABDOMINAL PAIN: 0
DIARRHEA: 0
VOMITING: 0
COUGH: 0

## 2022-09-30 ASSESSMENT — PAIN SCALES - GENERAL: PAINLEVEL_OUTOF10: 0

## 2022-09-30 ASSESSMENT — PAIN - FUNCTIONAL ASSESSMENT: PAIN_FUNCTIONAL_ASSESSMENT: NONE - DENIES PAIN

## 2022-09-30 NOTE — ED PROVIDER NOTES
[facility]  ED Provider Note  Department of Emergency Medicine     ED Room:       Written by: Candelaria Sandhoff, DO  Patient Name: Feng Chacko  Attending Provider: No att. providers found  Admit Date: 2022 10:15 AM  MRN: [id number]    : 1955        Chief Complaint   Patient presents with    Altered Mental Status    Aphasia    - Chief complaint    HPI   Feng Chacko is a 69 y.o. male presenting to the ED for evaluation of Altered Mental Status and Aphasia      History obtained from patient and wife. Patient has a past medical history of known left carotid stenosis, follows with vascular surgery in the past and has opted for medical management told it was a 70%; history of CAD, HLD, HTN, atrial fibrillation on Eliquis, ulcerative colitis. He is presenting to the ED today for evaluation of strokelike symptoms; patient and his wife state that they were out driving today when he suddenly started having trouble speaking/finding his words; they stopped at a store and his wife went inside, when she came back she states that he could not remember how to turn the car back on. The patient was aware that he was experiencing the symptoms. They also noticed that his right arm was weak and his wife saw that his gait seemed unstable when he got out of the car. She brought him to the ED via private vehicle. LKW about 10 AM today; patient's symptoms are improving, he states he is having no trouble speaking anymore but states that his right arm still feels a little bit weak. He denies any numbness or tingling anywhere; denies any headache, he ambulated into department without issue. He denies any headache, changes in vision, neck pain or stiffness, chest pain or palpitations, abdominal pain, shortness of breath, nausea or vomiting.   Does state for the past x1 month he has had episodic diarrhea containing blood, he has ulcerative colitis and states that he is in an active flare; he was seen at [facility] yesterday and was due to start mesalamine suppositories today and hold his Eliquis; his last dose of Eliquis was yesterday around 4 PM in the afternoon. Patient denies any history of intracranial hemorrhage, denies any history of previous stroke. His complaints overall are severe in severity, started about 20 minutes prior to arrival, and are starting to resolve. There are no specific aggravating or relieving factors for his symptoms. Review of Systems   Constitutional:  Negative for chills and fever. HENT:  Negative for rhinorrhea and sore throat. Eyes:  Negative for visual disturbance. Respiratory:  Negative for cough and shortness of breath. Cardiovascular:  Negative for chest pain and palpitations. Gastrointestinal:  Negative for abdominal pain, diarrhea, nausea and vomiting. Genitourinary:  Negative for dysuria and frequency. Musculoskeletal:  Negative for back pain and myalgias. Skin:  Negative for rash and wound. Neurological:  Positive for speech difficulty and weakness. Negative for syncope, numbness and headaches. Psychiatric/Behavioral:  Positive for confusion. All other systems reviewed and are negative. Physical Exam  Vitals and nursing note reviewed. Constitutional:       General: He is not in acute distress. Appearance: He is not toxic-appearing. HENT:      Head: Normocephalic and atraumatic. Right Ear: External ear normal.      Left Ear: External ear normal.      Nose: Nose normal. No rhinorrhea. Mouth/Throat:      Mouth: Mucous membranes are moist.      Pharynx: Oropharynx is clear. Eyes:      Extraocular Movements: Extraocular movements intact. Conjunctiva/sclera: Conjunctivae normal.      Pupils: Pupils are equal, round, and reactive to light. Cardiovascular:      Rate and Rhythm: Normal rate and regular rhythm. Pulses: Normal pulses. Heart sounds: Normal heart sounds.    Pulmonary:      Effort: Pulmonary effort is normal. No respiratory distress. Breath sounds: Normal breath sounds. No wheezing or rales. Abdominal:      General: Bowel sounds are normal.      Palpations: Abdomen is soft. Tenderness: There is no abdominal tenderness. There is no right CVA tenderness, left CVA tenderness, guarding or rebound. Musculoskeletal:         General: No tenderness. Normal range of motion. Cervical back: Normal range of motion and neck supple. Right lower leg: No edema. Left lower leg: No edema. Skin:     General: Skin is warm and dry. Capillary Refill: Capillary refill takes less than 2 seconds. Coloration: Skin is not jaundiced or pale. Findings: No rash. Neurological:      General: No focal deficit present. Mental Status: He is alert and oriented to person, place, and time. GCS: GCS eye subscore is 4. GCS verbal subscore is 5. GCS motor subscore is 6. Sensory: No sensory deficit. Motor: Weakness (4+/5 RUE) present. Coordination: Coordination abnormal (Abnormal finger-to-nose LUE). Gait: Gait normal.   Psychiatric:         Mood and Affect: Mood normal.         Behavior: Behavior normal.        NIH Stroke Scale at time of initial evaluation:  1A: Level of Consciousness 0 - alert; keenly responsive   1B: Ask Month and Age 0 - answers both questions correctly   1C:  Tell Patient To Open and Close Eyes, then Hand  Squeeze 0 - performs both tasks correctly   2: Test Horizontal Extraocular Movements 0 - normal   3: Test Visual Fields 0 - no visual loss   4: Test Facial Palsy 0 - normal symmetric movement   5A: Test Left Arm Motor Drift 0 - no drift, limb holds 90 (or 45) degrees for full 10 seconds   5B: Test Right Arm Motor Drift 1 - drift, limb holds 90 (or 45) degrees but drifts down before full 10 seconds: does not hit bed   6A: Test Left Leg Motor Drift 0 - no drift; leg holds 30 degree position for full 5 seconds   6B: Test Right Leg Motor Drift 0 - no drift; leg holds 30 degree position for full 5 seconds   7: Test Limb Ataxia   (FNF/Heel-Shin) 1 - present in one limb   8: Test Sensation 0 - normal; no sensory loss   9: Test Language/Aphasia 0 - no aphasia, normal   10: Test Dysarthria 0 - normal   11: Test Extinction/Inattention 0 - no abnormality   Total 2     LKW about 10AM  Last eliquis dose yesterday evening, around 4PM;   30 days of bleeding in stool, mostly bright red, having UC flare; seen at [facility] today, told to hold eliquis for now, starting this morning (did not take any today)      Procedures       MDM     Amount and/or Complexity of Data Reviewed  Decide to obtain previous medical records or to obtain history from someone other than the patient: yes         ED Course as of 09/30/22 1847   Fri Sep 30, 2022   1025 Stroke alert; LKW 10AM  NIHSS 2  On eliquis, last dose 4PM yesterday [VG]   1034 ATTENDING PROVIDER ATTESTATION:     I have personally performed and/or participated in the history, exam, medical decision making, and procedures and agree with all pertinent clinical information unless otherwise noted. I have also reviewed and agree with the past medical, family and social history unless otherwise noted. I have discussed this patient in detail with the resident, and provided the instruction and education regarding the patient. My findings/plan: This is a 64-year-old male with history of insulin-dependent DM, HTN, HLD, atrial fibrillation on anticoagulation, last took Eliquis yesterday evening, ulcerative colitis with recent flareup and active bleeding, who presents for evaluation of strokelike symptoms. Per the wife at bedside 20 minutes prior to arrival they were at [facility] when the patient began having difficulty with speech. He was unable to answer her questions. He also began to develop right-sided weakness to his RUE. On exam the patient is lying bed no acute distress. Pupils are equal round reactive to light.   Extraocular muscles are intact. Heart irregularly irregular, normal rhythm. NIH of 2 secondary to ataxia as well as weakness to the right upper extremity. Plan for labs, imaging, supportive care. [BB]   1217 Decision made to transfer patient to [facility]   [VG]   1240 Patient reevaluated, NIH of 0 at this time. [BB]   1243 Patient will be transported ED to ED to [facility]   [VG]   1256 Spoke with Dr. Rufino Zurita, discussed case, patient is accepted for ED to ED transport to [facility].  [VG]   1331 ETA ~90 minutes for transport   [VG]   1332 EKG interpreted by the emergency department physician, 1330:    Rate is 71; rhythm atrial fibrillation; no ST elevations, nonspecific ST/T wave abnormalities, , QTc 415; no significant changes when compared to previous EKG from May 2022. [VG]      ED Course User Index  [BB] Lauro Melgoza,   [VG] Devika Lopez DO         Medical Decision Making: This is a 69 y.o. male presenting for evaluation of acute CVA symptoms; started about 20-30 minutes prior to arrival, initially had aphasia per his wife, also seems to have gait abnormality per his wife prior to arrival which is since resolved; has right upper extremity weakness. On arrival patient is alert and oriented, no acute distress. Ambulated into the department, arrived by private vehicle. He was brought back to a room; evaluation showed NIHSS of 2 for abnormal finger-to-nose left upper extremity and drift present of his right upper extremity. Normal sensation throughout. Patient has normal speech, no other abnormalities noted. Stroke alert called overhead. Notably, patient was unlikely to be a TNKase candidate due to being on Eliquis, his last dose was the day before around 4 PM; also due to having GI bleeding, which is actually why he is holding his Eliquis starting today due to ulcerative colitis flare x1 month. Vitals significant for hypertension arrival, otherwise stable.   CTA head and CT brain perfusion obtained; there is no large vessel occlusion, there is no perfusion abnormality. There are areas of severe stenosis of vertebral artery noted and notably there is 90% stenosis of the left ICA; on reevaluation during this encounter patient symptoms have resolved, repeat NIH is 0. Findings were discussed with the patient and his wife, they state that they know about the stenosis but that they have been told it was 70%. They have followed with Dr. Joyce Sullivan in the past.  Given his neurologic symptoms and significant carotid artery stenosis, transfer was initiated to tertiary care center [facility]. Consult was placed to interventional radiology, did not speak with attending directly but we were notified via access center that they were told to send the patient to the facility. Given the time constraints and nature of patient's condition, ED to ED transfer was initiated. Spoke with ED attending at accepting facility, patient is accepted for transport. Patient remained nontoxic in appearance during this encounter and his symptoms remained resolved. He was updated on results and plan for transfer, they voiced understanding and are amenable. Patient was transported to [facility] in stable condition. See ED COURSE for additional MDM. Labs & imaging were reviewed and interpreted, see RESULTS. I have personally reviewed all laboratory and imaging results for this patient. I have discussed this patient with my attending, who has seen the patient and agrees with this disposition. Patient was seen and evaluated by myself and my attending No att. providers found. Assessment and Plan discussed with attending provider, please see attestation for final plan of care.        ED Course as of 09/30/22 1858   Fri Sep 30, 2022   1025 Stroke alert; LKW 10AM  NIHSS 2  On eliquis, last dose 4PM yesterday [VG]   7134 ATTENDING PROVIDER ATTESTATION:     I have personally performed and/or participated in the history, exam, medical decision making, and procedures and agree with all pertinent clinical information unless otherwise noted. I have also reviewed and agree with the past medical, family and social history unless otherwise noted. I have discussed this patient in detail with the resident, and provided the instruction and education regarding the patient. My findings/plan: This is a 68-year-old male with history of insulin-dependent DM, HTN, HLD, atrial fibrillation on anticoagulation, last took Eliquis yesterday evening, ulcerative colitis with recent flareup and active bleeding, who presents for evaluation of strokelike symptoms. Per the wife at bedside 20 minutes prior to arrival they were at [facility] when the patient began having difficulty with speech. He was unable to answer her questions. He also began to develop right-sided weakness to his RUE. On exam the patient is lying bed no acute distress. Pupils are equal round reactive to light. Extraocular muscles are intact. Heart irregularly irregular, normal rhythm. NIH of 2 secondary to ataxia as well as weakness to the right upper extremity. Plan for labs, imaging, supportive care. [BB]   1217 Decision made to transfer patient to [facility]   [VG]   1240 Patient reevaluated, NIH of 0 at this time. [BB]   1243 Patient will be transported ED to ED to [facility]   [VG]   1256 Spoke with Dr. Akhil Barnhart, discussed case, patient is accepted for ED to ED transport to [facility].  [VG]   1331 ETA ~90 minutes for transport   [VG]   1332 EKG interpreted by the emergency department physician, 1330:    Rate is 71; rhythm atrial fibrillation; no ST elevations, nonspecific ST/T wave abnormalities, , QTc 415; no significant changes when compared to previous EKG from May 2022.  [VG]      ED Course User Index  [BB] Maycol Tipton DO  [VG] Beverly Daly, DO --------------------------------------------- PAST HISTORY ---------------------------------------------  Past Medical History:  has a past medical history of SHIVAM (cerebral atherosclerosis), Coronary atherosclerosis of native coronary artery, COVID, [facility] Dizziness, History of tobacco use, Hyperlipidemia, Hypertension, Left carotid stenosis, and Ulcerative colitis ([facility] Past Surgical History:  has a past surgical history that includes Diagnostic Cardiac Cath Lab Procedure; sigmoidoscopy; and Colonoscopy. Social History:  reports that he quit smoking about 40 years ago. His smoking use included cigarettes. He started smoking about 50 years ago. He smoked an average of 1 pack per day. He has never used smokeless tobacco. He reports current alcohol use. He reports that he does not use drugs. Family History: family history includes Cancer in his mother. Unless otherwise noted, family history is non contributory. The patients home medications have been reviewed.     Allergies: Sulfa antibiotics    -------------------------------------------------- RESULTS -------------------------------------------------    Lab  Results for orders placed or performed during the hospital encounter of 09/30/22   CBC with Auto Differential   Result Value Ref Range    WBC 6.1 4.5 - 11.5 E9/L    RBC 4.28 3.80 - 5.80 E12/L    Hemoglobin 12.7 12.5 - 16.5 g/dL    Hematocrit 36.4 (L) 37.0 - 54.0 %    MCV 85.0 80.0 - 99.9 fL    MCH 29.7 26.0 - 35.0 pg    MCHC 34.9 (H) 32.0 - 34.5 %    RDW 13.3 11.5 - 15.0 fL    Platelets 997 891 - 942 E9/L    MPV 9.4 7.0 - 12.0 fL    Neutrophils % 79.9 43.0 - 80.0 %    Immature Granulocytes % 0.3 0.0 - 5.0 %    Lymphocytes % 11.6 (L) 20.0 - 42.0 %    Monocytes % 5.9 2.0 - 12.0 %    Eosinophils % 1.8 0.0 - 6.0 %    Basophils % 0.5 0.0 - 2.0 %    Neutrophils Absolute 4.87 1.80 - 7.30 E9/L    Immature Granulocytes # 0.02 E9/L    Lymphocytes Absolute 0.71 (L) 1.50 - 4.00 E9/L Monocytes Absolute 0.36 0.10 - 0.95 E9/L    Eosinophils Absolute 0.11 0.05 - 0.50 E9/L    Basophils Absolute 0.03 0.00 - 0.20 E9/L   Comprehensive Metabolic Panel w/ Reflex to MG   Result Value Ref Range    Sodium 137 132 - 146 mmol/L    Potassium reflex Magnesium 4.0 3.5 - 5.0 mmol/L    Chloride 101 98 - 107 mmol/L    CO2 27 22 - 29 mmol/L    Anion Gap 9 7 - 16 mmol/L    Glucose 285 (H) 74 - 99 mg/dL    BUN 12 6 - 23 mg/dL    Creatinine 0.9 0.7 - 1.2 mg/dL    GFR Non-African American >60 >=60 mL/min/1.73    GFR African American >60     Calcium 9.3 8.6 - 10.2 mg/dL    Total Protein 6.8 6.4 - 8.3 g/dL    Albumin 4.5 3.5 - 5.2 g/dL    Total Bilirubin 0.6 0.0 - 1.2 mg/dL    Alkaline Phosphatase 90 40 - 129 U/L    ALT <5 0 - 40 U/L    AST 17 0 - 39 U/L   Protime-INR   Result Value Ref Range    Protime 13.4 (H) 9.3 - 12.4 sec    INR 1.2    APTT   Result Value Ref Range    aPTT 32.0 24.5 - 35.1 sec   Urinalysis with Microscopic   Result Value Ref Range    Color, UA Yellow Straw/Yellow    Clarity, UA Clear Clear    Glucose, Ur 250 (A) Negative mg/dL    Bilirubin Urine Negative Negative    Ketones, Urine Negative Negative mg/dL    Specific Gravity, UA 1.010 1.005 - 1.030    Blood, Urine Negative Negative    pH, UA 7.5 5.0 - 9.0    Protein, UA Negative Negative mg/dL    Urobilinogen, Urine 0.2 <2.0 E.U./dL    Nitrite, Urine Negative Negative    Leukocyte Esterase, Urine Negative Negative    WBC, UA NONE 0 - 5 /HPF    RBC, UA NONE 0 - 2 /HPF    Bacteria, UA NONE SEEN None Seen /HPF   POCT Glucose   Result Value Ref Range    Glucose 282 mg/dL   POCT Glucose   Result Value Ref Range    Meter Glucose 284 (H) 74 - 99 mg/dL   EKG 12 Lead   Result Value Ref Range    Ventricular Rate 71 BPM    Atrial Rate 64 BPM    QRS Duration 112 ms    Q-T Interval 382 ms    QTc Calculation (Bazett) 415 ms    R Axis 46 degrees    T Axis 41 degrees       Radiology  XR CHEST (2 VW)   Final Result   No acute process.          CT HEAD WO CONTRAST Final Result   No acute intracranial abnormality. CTA HEAD W CONTRAST   Final Result   1. No perfusion mismatch. 2. No intracranial large vessel occlusion or aneurysm. 3. Severe stenosis of the intracranial V4 segment of the right vertebral   artery. Moderate stenosis of the intracranial left vertebral artery. 4. Severe high-grade stenosis measuring at least 90% within the proximal left   cervical internal carotid artery at the origin. 5. Approximately 50% diameter stenosis involving the right proximal cervical   ICA secondary to atherosclerosis. CTA NECK W CONTRAST   Final Result   1. No perfusion mismatch. 2. No intracranial large vessel occlusion or aneurysm. 3. Severe stenosis of the intracranial V4 segment of the right vertebral   artery. Moderate stenosis of the intracranial left vertebral artery. 4. Severe high-grade stenosis measuring at least 90% within the proximal left   cervical internal carotid artery at the origin. 5. Approximately 50% diameter stenosis involving the right proximal cervical   ICA secondary to atherosclerosis. CT BRAIN PERFUSION   Final Result   1. No perfusion mismatch. 2. No intracranial large vessel occlusion or aneurysm. 3. Severe stenosis of the intracranial V4 segment of the right vertebral   artery. Moderate stenosis of the intracranial left vertebral artery. 4. Severe high-grade stenosis measuring at least 90% within the proximal left   cervical internal carotid artery at the origin. 5. Approximately 50% diameter stenosis involving the right proximal cervical   ICA secondary to atherosclerosis. Interpreted by the radiologist unless otherwise specified.    ------------------------- NURSING NOTES AND VITALS REVIEWED ---------------------------  Date / Time Roomed:  9/30/2022 10:15 AM  ED Bed Assignment:  HANY/HANY    The nursing notes within the ED encounter and vital signs as below have been reviewed by myself. Patient Vitals for the past 24 hrs:   BP Temp Temp src Pulse Resp SpO2 Height Weight   09/30/22 1415 (!) 156/89 -- -- 77 -- 97 % -- --   09/30/22 1300 (!) 169/87 98 °F (36.7 °C) Oral 80 14 100 % -- --   09/30/22 1100 (!) 166/78 -- -- 79 14 100 % -- --   09/30/22 1015 (!) 163/89 97.3 °F (36.3 °C) Temporal 81 14 100 % 6' 1\" (1.854 m) 176 lb (79.8 kg)       Oxygen Saturation Interpretation: Normal    The patients available past medical records and past encounters were reviewed. ------------------------------------------ PROGRESS NOTES ------------------------------------------  Re-evaluation(s):  Please see ED course    I have spoken with the patient and his wife  and discussed todays results, in addition to providing specific details for the plan of care and counseling regarding the diagnosis and prognosis. Their questions are answered at this time and they are agreeable with the plan. I have discussed the risks and benefits of transfer and they wish to proceed with the transfer. --------------------------------- ADDITIONAL PROVIDER NOTES ---------------------------------  Consultations:  Please see ED course    Reason for transfer: Neurologic symptoms, 90% left internal carotid artery stenosis; IR/vascular consult, services not available at this facility. This patient's ED course included: a personal history and physicial examination, re-evaluation prior to disposition, multiple bedside re-evaluations, IV medications, cardiac monitoring, continuous pulse oximetry, and complex medical decision making and emergency management    This patient has remained hemodynamically stable during their ED course. Please note that the withdrawal or failure to initiate urgent interventions for this patient would likely result in a life threatening deterioration or permanent disability. Accordingly this patient received 45 minutes of critical care time, excluding separately billable procedures.     Clinical Impression  1. Left carotid artery stenosis    2. Vertebral artery stenosis, unspecified laterality    3. Stroke-like symptoms          Disposition  Patient's disposition: Transfer to [facility]. Transferred by: ALS. Patient's condition is critical.           Pennie Rogers D.O. PGY-3     Resident Physician     Emergency Medicine      9/30/2022 10:22 AM      NOTE: This report was transcribed using voice recognition software.  Every effort was made to ensure accuracy; however, inadvertent computerized transcription errors may be present             Pennie Rogers DO  Resident  09/30/22 0520

## 2022-09-30 NOTE — PROGRESS NOTES
Dr. Jalen Chirinos and this RN saw patient in ED. Wife at bedside. Discussed plan of care moving forward. Patient asymptomatic at this time. Discussed options for carotid stenting vs. Vascular consult.

## 2022-09-30 NOTE — ED PROVIDER NOTES
HPI:  9/30/22,   Time: 3:33 PM EDT       Kelly López is a 69 y.o. male presenting to the ED for right arm weakness, beginning 8 hrs ago. The complaint has been intermittent, moderate in severity, and worsened by nothing. 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  No sx currently. Hx uc, currently having rectal bleeding. No cp/sob/n/v/d/abd pain. No ha. No sensory changes. No neuro c/o currently    Review of Systems:   Pertinent positives and negatives are stated within HPI, all other systems reviewed and are negative.          --------------------------------------------- PAST HISTORY ---------------------------------------------  Past Medical History:  has a past medical history of SHIVAM (cerebral atherosclerosis), Coronary atherosclerosis of native coronary artery, COVID, Diabetes mellitus ([facility] [address] Dizziness, History of tobacco use, Hyperlipidemia, Hypertension, Left carotid stenosis, and [facility] Past Surgical History:  has a past surgical history that includes Diagnostic Cardiac Cath Lab Procedure; sigmoidoscopy; and Colonoscopy. Social History:  reports that he quit smoking about 40 years ago. His smoking use included cigarettes. He started smoking about 50 years ago. He smoked an average of 1 pack per day. He has never used smokeless tobacco. He reports current alcohol use. He reports that he does not use drugs. Family History: family history includes Cancer in his mother. The patients home medications have been reviewed.     Allergies: Sulfa antibiotics        ---------------------------------------------------PHYSICAL EXAM--------------------------------------    Constitutional/General: Alert and oriented x3, well appearing, non toxic in NAD  Head: Normocephalic and atraumatic  Eyes: PERRL, EOMI, conjunctive normal, sclera non icteric  Mouth: Oropharynx clear, handling secretions, no trismus, no asymmetry of the posterior oropharynx or uvular edema  Neck: Supple, full ROM,   Respiratory:  Not in respiratory distress  Cardiovascular:  Regular rate. Regular rhythm. 2+ distal pulses  Chest: No chest wall tenderness    Musculoskeletal: Moves all extremities x 4. Warm and well perfused, no clubbing, cyanosis, or edema. Capillary refill <3 seconds  Integument: skin warm and dry. No rashes. Lymphatic: no lymphadenopathy noted  Neurologic: GCS 15, no focal deficits, symmetric strength 5/5 in the upper and lower extremities bilaterally, no sensory deficits, nih 0  Psychiatric: Normal Affect    -------------------------------------------------- RESULTS -------------------------------------------------  I have personally reviewed all laboratory and imaging results for this patient. Results are listed below. LABS:  No results found for this visit on 09/30/22. RADIOLOGY:  Interpreted by Radiologist.  No orders to display       EKG: This EKG is signed and interpreted by the EP. Time:   Rate:   Rhythm:   Interpretation:   Comparison:       ------------------------- NURSING NOTES AND VITALS REVIEWED ---------------------------   The nursing notes within the ED encounter and vital signs as below have been reviewed by myself. BP (!) 174/93   Pulse 78   Temp 97.4 °F (36.3 °C) (Oral)   Resp 20   SpO2 99%   Oxygen Saturation Interpretation: Normal    The patients available past medical records and past encounters were reviewed. ------------------------------ ED COURSE/MEDICAL DECISION MAKING----------------------  Medications   aspirin EC tablet 81 mg (81 mg Oral Given 9/30/22 1644)         ED COURSE:       Medical Decision Making:    No neuro sx in ed, ir and vascular eval, will admit for cea inpt      This patient's ED course included: a personal history and physicial examination    This patient has remained hemodynamically stable during their ED course.           Consultations: [facility]  [facility]    Critical Care:         Counseling: The emergency provider has spoken with the patient and discussed todays results, in addition to providing specific details for the plan of care and counseling regarding the diagnosis and prognosis. Questions are answered at this time and they are agreeable with the plan.       --------------------------------- IMPRESSION AND DISPOSITION ---------------------------------    IMPRESSION  1. RUE weakness        DISPOSITION  Disposition: Admit to telemetry  Patient condition is stable    NOTE: This report was transcribed using voice recognition software.  Every effort was made to ensure accuracy; however, inadvertent computerized transcription errors may be present        Norah Ureña MD  09/30/22 [id number]

## 2022-09-30 NOTE — CONSULTS
Neuro-Interventional/ Interventional Consult     Patient Unique Almazan  MRN: [id number]  YOB: 1955  DATE OF EVALUATION: 9/30/2022    HPI:   Chief Complaint   Patient presents with    Other     Transfer from [facility],   R arm weakness, has L carotid stenosis  Transferred over for IR        Assessment:   Reason For Evaluation:   Antoni Haile a 69 y.o. male with severe left ica stenosis presents with left arm weakness and confusion to [place] ED . Patient transferred to [facility] main and symptoms resolved. Pt with history of active UC. Imaging reviewed by me shows shows severe approx 90% left ica stenosis  Physical Exam: AOx3    Plan:     Rec    Discussed risks and benefits of carotid stent with patient and wife. Ask Vascular Surgery to eval the patient for possible endarterectomy. Contract Dr. Ward Canales and he will evaluate. If not candidate for CEA , or contraindicated will consider angio and carotid stenting    45 min of which greater then 50% was spent either coordinating care or couseling    We discussed the possible risks including, including stroke, bleeding, and need for dapt. Will await eval by Vascular Surgery    We had an extensive discussion regarding different treatment options. We discussed possible risks of procedure including death. I answered all questions from patient/family to their satisfaction, they understand the options and risk and wishes to proceed. Allergies: Allergies   Allergen Reactions    Sulfa Antibiotics Other (See Comments)     MOUTH BLISTERS     Prior to Visit Medications    Medication Sig Taking?  Authorizing Provider   apixaban (ELIQUIS) 5 MG TABS tablet Take 5 mg by mouth 2 times daily  Historical Provider, MD   Cholecalciferol (VITAMIN D3) 50 MCG (2000 UT) TABS Take 2,000-6,000 Units by mouth daily  Historical Provider, MD   potassium chloride (KLOR-CON M) 20 MEQ extended release tablet Take 60 mEq by mouth daily  Historical Provider, MD hydrocortisone (ANUSOL-HC) 25 MG suppository Place 25 mg rectally 2 times daily  Historical Provider, MD   insulin lispro (HUMALOG) 100 UNIT/ML injection vial Inject into the skin 3 times daily (before meals) PER SLIDING SCALE  Historical Provider, MD   Probiotic Product (PROBIOTIC DAILY) CAPS Take 1 capsule by mouth nightly  Historical Provider, MD   vedolizumab (ENTYVIO) 300 MG injection Infuse 300 mg intravenously every 28 days GIVEN AT [facility]  Historical Provider, MD   terazosin (HYTRIN) 2 MG capsule Take 2 mg by mouth every morning **SEE OTHER ORDER**  Historical Provider, MD   Turmeric 500 MG CAPS Take 500 mg by mouth 2 times daily  Historical Provider, MD   metoprolol succinate (TOPROL XL) 50 MG extended release tablet Take 50 mg by mouth every morning  Historical Provider, MD   CIALIS 20 MG tablet Take 20 mg by mouth as needed for Erectile Dysfunction  Historical Provider, MD   amLODIPine-benazepril (LOTREL) 10-20 MG per capsule Take 1 capsule by mouth every morning  Historical Provider, MD   terazosin (HYTRIN) 5 MG capsule Take 5 mg by mouth nightly   Historical Provider, MD   hydrochlorothiazide (HYDRODIURIL) 25 MG tablet Take 25 mg by mouth every morning  Historical Provider, MD   pravastatin (PRAVACHOL) 20 MG tablet Take 20 mg by mouth nightly   Historical Provider, MD   balsalazide (COLAZAL) 750 MG capsule Take 2,250 mg by mouth 3 times daily  Historical Provider, MD   Testosterone 20.25 MG/1.25GM (1.62%) GEL Place 1 Package onto the skin daily  Historical Provider, MD   insulin glargine (LANTUS) 100 UNIT/ML injection Inject 26 Units into the skin every morning  Historical Provider, MD     Social History     Tobacco Use    Smoking status: Former     Packs/day: 1.00     Types: Cigarettes     Start date: 1972     Quit date: 1982     Years since quittin.7    Smokeless tobacco: Never   Vaping Use    Vaping Use: Never used   Substance Use Topics    Alcohol use: Yes     Comment: occasionally Drug use: No     Family History   Problem Relation Age of Onset    Cancer Mother      Past Surgical History:   Procedure Laterality Date    COLONOSCOPY      DIAGNOSTIC CARDIAC CATH LAB PROCEDURE      SIGMOIDOSCOPY       Past Medical History:   Diagnosis Date    SHIVAM (cerebral atherosclerosis)     Coronary atherosclerosis of native coronary artery     COVID     Diabetes mellitus ([facility]     Dizziness     History of tobacco use 5/19/2022    Hyperlipidemia     Hypertension     Left carotid stenosis 5/19/2022    Ulcerative colitis ([facility]          Objective:   BP (!) 174/93   Pulse 78   Temp 97.4 °F (36.3 °C) (Oral)   Resp 20   SpO2 99%       Laboratory/Radiology:     Recent Results (from the past 24 hour(s))   POCT Glucose    Collection Time: 09/30/22 10:24 AM   Result Value Ref Range    Meter Glucose 284 (H) 74 - 99 mg/dL   CBC with Auto Differential    Collection Time: 09/30/22 10:30 AM   Result Value Ref Range    WBC 6.1 4.5 - 11.5 E9/L    RBC 4.28 3.80 - 5.80 E12/L    Hemoglobin 12.7 12.5 - 16.5 g/dL    Hematocrit 36.4 (L) 37.0 - 54.0 %    MCV 85.0 80.0 - 99.9 fL    MCH 29.7 26.0 - 35.0 pg    MCHC 34.9 (H) 32.0 - 34.5 %    RDW 13.3 11.5 - 15.0 fL    Platelets 554 403 - 770 E9/L    MPV 9.4 7.0 - 12.0 fL    Neutrophils % 79.9 43.0 - 80.0 %    Immature Granulocytes % 0.3 0.0 - 5.0 %    Lymphocytes % 11.6 (L) 20.0 - 42.0 %    Monocytes % 5.9 2.0 - 12.0 %    Eosinophils % 1.8 0.0 - 6.0 %    Basophils % 0.5 0.0 - 2.0 %    Neutrophils Absolute 4.87 1.80 - 7.30 E9/L    Immature Granulocytes # 0.02 E9/L    Lymphocytes Absolute 0.71 (L) 1.50 - 4.00 E9/L    Monocytes Absolute 0.36 0.10 - 0.95 E9/L    Eosinophils Absolute 0.11 0.05 - 0.50 E9/L    Basophils Absolute 0.03 0.00 - 0.20 E9/L   Comprehensive Metabolic Panel w/ Reflex to MG    Collection Time: 09/30/22 10:30 AM   Result Value Ref Range    Sodium 137 132 - 146 mmol/L    Potassium reflex Magnesium 4.0 3.5 - 5.0 mmol/L    Chloride 101 98 - 107 mmol/L    CO2 27 22 - 29 mmol/L    Anion Gap 9 7 - 16 mmol/L    Glucose 285 (H) 74 - 99 mg/dL    BUN 12 6 - 23 mg/dL    Creatinine 0.9 0.7 - 1.2 mg/dL    GFR Non-African American >60 >=60 mL/min/1.73    GFR African American >60     Calcium 9.3 8.6 - 10.2 mg/dL    Total Protein 6.8 6.4 - 8.3 g/dL    Albumin 4.5 3.5 - 5.2 g/dL    Total Bilirubin 0.6 0.0 - 1.2 mg/dL    Alkaline Phosphatase 90 40 - 129 U/L    ALT <5 0 - 40 U/L    AST 17 0 - 39 U/L   Protime-INR    Collection Time: 09/30/22 10:30 AM   Result Value Ref Range    Protime 13.4 (H) 9.3 - 12.4 sec    INR 1.2    APTT    Collection Time: 09/30/22 10:30 AM   Result Value Ref Range    aPTT 32.0 24.5 - 35.1 sec   Urinalysis with Microscopic    Collection Time: 09/30/22 10:30 AM   Result Value Ref Range    Color, UA Yellow Straw/Yellow    Clarity, UA Clear Clear    Glucose, Ur 250 (A) Negative mg/dL    Bilirubin Urine Negative Negative    Ketones, Urine Negative Negative mg/dL    Specific Gravity, UA 1.010 1.005 - 1.030    Blood, Urine Negative Negative    pH, UA 7.5 5.0 - 9.0    Protein, UA Negative Negative mg/dL    Urobilinogen, Urine 0.2 <2.0 E.U./dL    Nitrite, Urine Negative Negative    Leukocyte Esterase, Urine Negative Negative    WBC, UA NONE 0 - 5 /HPF    RBC, UA NONE 0 - 2 /HPF    Bacteria, UA NONE SEEN None Seen /HPF   POCT Glucose    Collection Time: 09/30/22 10:35 AM   Result Value Ref Range    Glucose 282 mg/dL   EKG 12 Lead    Collection Time: 09/30/22  1:30 PM   Result Value Ref Range    Ventricular Rate 71 BPM    Atrial Rate 64 BPM    QRS Duration 112 ms    Q-T Interval 382 ms    QTc Calculation (Bazett) 415 ms    R Axis 46 degrees    T Axis 41 degrees       XR CHEST (2 VW)    Result Date: 9/30/2022  EXAMINATION: TWO XRAY VIEWS OF THE CHEST 9/30/2022 10:50 am COMPARISON: 04/25/2022 HISTORY: ORDERING SYSTEM PROVIDED HISTORY: weakness TECHNOLOGIST PROVIDED HISTORY: Reason for exam:->weakness FINDINGS: The lungs are without acute focal process.   There is no effusion or pneumothorax. The cardiomediastinal silhouette is without acute process. The osseous structures are without acute process. No acute process. CT HEAD WO CONTRAST    Result Date: 9/30/2022  EXAMINATION: CT OF THE HEAD WITHOUT CONTRAST  9/30/2022 10:57 am TECHNIQUE: CT of the head was performed without the administration of intravenous contrast. Automated exposure control, iterative reconstruction, and/or weight based adjustment of the mA/kV was utilized to reduce the radiation dose to as low as reasonably achievable. COMPARISON: None. HISTORY: ORDERING SYSTEM PROVIDED HISTORY: right sided weakness TECHNOLOGIST PROVIDED HISTORY: Reason for exam:->right sided weakness Has a \"code stroke\" or \"stroke alert\" been called? ->Yes Decision Support Exception - unselect if not a suspected or confirmed emergency medical condition->Emergency Medical Condition (MA) FINDINGS: BRAIN/VENTRICLES: There is no acute intracranial hemorrhage, mass effect or midline shift. No abnormal extra-axial fluid collection. The gray-white differentiation is maintained without evidence of an acute infarct. There is no evidence of hydrocephalus. ORBITS: The visualized portion of the orbits demonstrate no acute abnormality. SINUSES: There is a soft tissue nodule in the anterior near ease on the right which could represent a polyp. Yulia bullosa of the left middle nasal turbinate. Unchanged mucosal thickening of the anterior ethmoid sinuses and left maxillary sinus. Mastoid air cells are clear. SOFT TISSUES/SKULL:  No acute abnormality of the visualized skull or soft tissues. No acute intracranial abnormality. CTA NECK W CONTRAST    Result Date: 9/30/2022  EXAMINATION: CTA OF THE NECK; CTA OF THE HEAD WITH CONTRAST; CTA OF THE HEAD WITH CONTRAST WITH PERFUSION 9/30/2022 10:57 am: TECHNIQUE: CTA of the neck was performed with the administration of intravenous contrast. Multiplanar reformatted images are provided for review.   MIP images are provided for review. Stenosis of the internal carotid arteries measured using NASCET criteria. Automated exposure control, iterative reconstruction, and/or weight based adjustment of the mA/kV was utilized to reduce the radiation dose to as low as reasonably achievable.; CTA of the head/brain was performed with the administration of intravenous contrast. Multiplanar reformatted images are provided for review. MIP images are provided for review. Automated exposure control, iterative reconstruction, and/or weight based adjustment of the mA/kV was utilized to reduce the radiation dose to as low as reasonably achievable. This scan was analyzed using Viz. ai contact LVO. Identification of suspected findings is not for diagnostic use beyond notification. Viz LVO is limited to analysis of imaging data and should not be used in-lieu of full patient evaluation or relied upon to make or confirm diagnosis. COMPARISON: None. HISTORY: ORDERING SYSTEM PROVIDED HISTORY: weakness TECHNOLOGIST PROVIDED HISTORY: Reason for exam:->weakness Has a \"code stroke\" or \"stroke alert\" been called? ->Yes Decision Support Exception - unselect if not a suspected or confirmed emergency medical condition->Emergency Medical Condition (MA); ORDERING SYSTEM PROVIDED HISTORY: weakness to right TECHNOLOGIST PROVIDED HISTORY: Reason for exam:->weakness to right Has a \"code stroke\" or \"stroke alert\" been called? ->Yes Decision Support Exception - unselect if not a suspected or confirmed emergency medical condition->Emergency Medical Condition (MA) FINDINGS: CTA NECK: AORTIC ARCH/ARCH VESSELS: No dissection or arterial injury. No significant stenosis of the brachiocephalic or subclavian arteries. CAROTID ARTERIES: Approximately 90% diameter stenosis is identified within the proximal left ICA due to atherosclerosis. Approximately 50% diameter stenosis is identified within the proximal right ICA due to atherosclerosis.  VERTEBRAL ARTERIES: No dissection, arterial injury, or significant stenosis. SOFT TISSUES: The lung apices are clear. No cervical or superior mediastinal lymphadenopathy. The larynx and pharynx are unremarkable. No acute abnormality of the salivary and thyroid glands. BONES: No acute osseous abnormality. CTA HEAD: ANTERIOR CIRCULATION: No significant stenosis of the intracranial internal carotid, anterior cerebral, or middle cerebral arteries. No aneurysm. POSTERIOR CIRCULATION: There is severe stenosis of the V4 segment of the right vertebral artery due to atherosclerosis. Moderate stenosis identified within the left vertebral artery intracranially. OTHER: No dural venous sinus thrombosis on this non-dedicated study. BRAIN: No mass effect or midline shift. No extra-axial fluid collection. The gray-white differentiation is maintained. CT PERFUSION: EXAM QUALITY: The examination is diagnostic with appropriate arterial inflow and venous outflow curves, and diagnostic perfusion maps. CORE INFARCT: The total area of ischemic core is 0 mL (CBF<30% volume). TOTAL HYPOPERFUSION: The total area of hypoperfusion is 0 mL (Tmax>6s volume). PENUMBRA: The penumbra (mismatch) volume is 0 mL and is located in the N/A. The mismatch ratio is N/A.     1. No perfusion mismatch. 2. No intracranial large vessel occlusion or aneurysm. 3. Severe stenosis of the intracranial V4 segment of the right vertebral artery. Moderate stenosis of the intracranial left vertebral artery. 4. Severe high-grade stenosis measuring at least 90% within the proximal left cervical internal carotid artery at the origin. 5. Approximately 50% diameter stenosis involving the right proximal cervical ICA secondary to atherosclerosis.      CT BRAIN PERFUSION    Result Date: 9/30/2022  EXAMINATION: CTA OF THE NECK; CTA OF THE HEAD WITH CONTRAST; CTA OF THE HEAD WITH CONTRAST WITH PERFUSION 9/30/2022 10:57 am: TECHNIQUE: CTA of the neck was performed with the administration of intravenous contrast. Multiplanar reformatted images are provided for review. MIP images are provided for review. Stenosis of the internal carotid arteries measured using NASCET criteria. Automated exposure control, iterative reconstruction, and/or weight based adjustment of the mA/kV was utilized to reduce the radiation dose to as low as reasonably achievable.; CTA of the head/brain was performed with the administration of intravenous contrast. Multiplanar reformatted images are provided for review. MIP images are provided for review. Automated exposure control, iterative reconstruction, and/or weight based adjustment of the mA/kV was utilized to reduce the radiation dose to as low as reasonably achievable. This scan was analyzed using [employer]. ai contact LVO. Identification of suspected findings is not for diagnostic use beyond notification. Viz LVO is limited to analysis of imaging data and should not be used in-lieu of full patient evaluation or relied upon to make or confirm diagnosis. COMPARISON: None. HISTORY: ORDERING SYSTEM PROVIDED HISTORY: weakness TECHNOLOGIST PROVIDED HISTORY: Reason for exam:->weakness Has a \"code stroke\" or \"stroke alert\" been called? ->Yes Decision Support Exception - unselect if not a suspected or confirmed emergency medical condition->Emergency Medical Condition (MA); ORDERING SYSTEM PROVIDED HISTORY: weakness to right TECHNOLOGIST PROVIDED HISTORY: Reason for exam:->weakness to right Has a \"code stroke\" or \"stroke alert\" been called? ->Yes Decision Support Exception - unselect if not a suspected or confirmed emergency medical condition->Emergency Medical Condition (MA) FINDINGS: CTA NECK: AORTIC ARCH/ARCH VESSELS: No dissection or arterial injury. No significant stenosis of the brachiocephalic or subclavian arteries. CAROTID ARTERIES: Approximately 90% diameter stenosis is identified within the proximal left ICA due to atherosclerosis.   Approximately 50% diameter stenosis is identified within the proximal right ICA due to atherosclerosis. VERTEBRAL ARTERIES: No dissection, arterial injury, or significant stenosis. SOFT TISSUES: The lung apices are clear. No cervical or superior mediastinal lymphadenopathy. The larynx and pharynx are unremarkable. No acute abnormality of the salivary and thyroid glands. BONES: No acute osseous abnormality. CTA HEAD: ANTERIOR CIRCULATION: No significant stenosis of the intracranial internal carotid, anterior cerebral, or middle cerebral arteries. No aneurysm. POSTERIOR CIRCULATION: There is severe stenosis of the V4 segment of the right vertebral artery due to atherosclerosis. Moderate stenosis identified within the left vertebral artery intracranially. OTHER: No dural venous sinus thrombosis on this non-dedicated study. BRAIN: No mass effect or midline shift. No extra-axial fluid collection. The gray-white differentiation is maintained. CT PERFUSION: EXAM QUALITY: The examination is diagnostic with appropriate arterial inflow and venous outflow curves, and diagnostic perfusion maps. CORE INFARCT: The total area of ischemic core is 0 mL (CBF<30% volume). TOTAL HYPOPERFUSION: The total area of hypoperfusion is 0 mL (Tmax>6s volume). PENUMBRA: The penumbra (mismatch) volume is 0 mL and is located in the N/A. The mismatch ratio is N/A.     1. No perfusion mismatch. 2. No intracranial large vessel occlusion or aneurysm. 3. Severe stenosis of the intracranial V4 segment of the right vertebral artery. Moderate stenosis of the intracranial left vertebral artery. 4. Severe high-grade stenosis measuring at least 90% within the proximal left cervical internal carotid artery at the origin. 5. Approximately 50% diameter stenosis involving the right proximal cervical ICA secondary to atherosclerosis.      CTA HEAD W CONTRAST    Result Date: 9/30/2022  EXAMINATION: CTA OF THE NECK; CTA OF THE HEAD WITH CONTRAST; CTA OF THE HEAD WITH CONTRAST WITH PERFUSION 9/30/2022 10:57 am: TECHNIQUE: CTA of the neck was performed with the administration of intravenous contrast. Multiplanar reformatted images are provided for review. MIP images are provided for review. Stenosis of the internal carotid arteries measured using NASCET criteria. Automated exposure control, iterative reconstruction, and/or weight based adjustment of the mA/kV was utilized to reduce the radiation dose to as low as reasonably achievable.; CTA of the head/brain was performed with the administration of intravenous contrast. Multiplanar reformatted images are provided for review. MIP images are provided for review. Automated exposure control, iterative reconstruction, and/or weight based adjustment of the mA/kV was utilized to reduce the radiation dose to as low as reasonably achievable. This scan was analyzed using Viz. ai contact LVO. Identification of suspected findings is not for diagnostic use beyond notification. Viz LVO is limited to analysis of imaging data and should not be used in-lieu of full patient evaluation or relied upon to make or confirm diagnosis. COMPARISON: None. HISTORY: ORDERING SYSTEM PROVIDED HISTORY: weakness TECHNOLOGIST PROVIDED HISTORY: Reason for exam:->weakness Has a \"code stroke\" or \"stroke alert\" been called? ->Yes Decision Support Exception - unselect if not a suspected or confirmed emergency medical condition->Emergency Medical Condition (MA); ORDERING SYSTEM PROVIDED HISTORY: weakness to right TECHNOLOGIST PROVIDED HISTORY: Reason for exam:->weakness to right Has a \"code stroke\" or \"stroke alert\" been called? ->Yes Decision Support Exception - unselect if not a suspected or confirmed emergency medical condition->Emergency Medical Condition (MA) FINDINGS: CTA NECK: AORTIC ARCH/ARCH VESSELS: No dissection or arterial injury. No significant stenosis of the brachiocephalic or subclavian arteries.  CAROTID ARTERIES: Approximately 90% diameter stenosis is identified within the proximal left ICA due to atherosclerosis. Approximately 50% diameter stenosis is identified within the proximal right ICA due to atherosclerosis. VERTEBRAL ARTERIES: No dissection, arterial injury, or significant stenosis. SOFT TISSUES: The lung apices are clear. No cervical or superior mediastinal lymphadenopathy. The larynx and pharynx are unremarkable. No acute abnormality of the salivary and thyroid glands. BONES: No acute osseous abnormality. CTA HEAD: ANTERIOR CIRCULATION: No significant stenosis of the intracranial internal carotid, anterior cerebral, or middle cerebral arteries. No aneurysm. POSTERIOR CIRCULATION: There is severe stenosis of the V4 segment of the right vertebral artery due to atherosclerosis. Moderate stenosis identified within the left vertebral artery intracranially. OTHER: No dural venous sinus thrombosis on this non-dedicated study. BRAIN: No mass effect or midline shift. No extra-axial fluid collection. The gray-white differentiation is maintained. CT PERFUSION: EXAM QUALITY: The examination is diagnostic with appropriate arterial inflow and venous outflow curves, and diagnostic perfusion maps. CORE INFARCT: The total area of ischemic core is 0 mL (CBF<30% volume). TOTAL HYPOPERFUSION: The total area of hypoperfusion is 0 mL (Tmax>6s volume). PENUMBRA: The penumbra (mismatch) volume is 0 mL and is located in the N/A. The mismatch ratio is N/A.     1. No perfusion mismatch. 2. No intracranial large vessel occlusion or aneurysm. 3. Severe stenosis of the intracranial V4 segment of the right vertebral artery. Moderate stenosis of the intracranial left vertebral artery. 4. Severe high-grade stenosis measuring at least 90% within the proximal left cervical internal carotid artery at the origin. 5. Approximately 50% diameter stenosis involving the right proximal cervical ICA secondary to atherosclerosis.        Patient Active Problem List   Diagnosis    Coronary atherosclerosis of native coronary artery    Hypertension    Hyperlipidemia    Diabetes mellitus ([facility]    Longstanding persistent atrial fibrillation ([facility]    History of echocardiogram    COVID-19    Adrenal adenoma    Testicular hypofunction    Ulcerative colitis ([facility]    Thrombocytopenia (HCC)    Left carotid stenosis    History of tobacco use         Linda Doty II, MD  3:59 PM  9/30/2022

## 2022-09-30 NOTE — ED NOTES
Radiology Procedure Waiver   Name: Ferris Olszewski  : 1955  MRN: [id number]    Date:  22    Time: 10:20 AM EDT    Benefits of immediately proceeding with Radiology exam(s) without pre-testing outweigh the risks or are not indicated as specified below and therefore the following is/are being waived:    [] Pregnancy test   [] Patients LMP on-time and regular or recent negative pregnancy test   [] Patient had Tubal Ligation or has other Contraception Device. [] Patient  is Menopausal or Premenarcheal.    [] Patient had Full or Partial Hysterectomy. [] Protocol for Iodine allergy   [] Patient states they can tolerate IV dye    [x] BUN/Creatinine   [] Patient age w/no hx of renal dysfunction. [] Patient on Dialysis. [] Recent Normal Labs.     Electronically signed by Samantha Solis PA-C on 22 at 10:20 AM EDT               Samantha Solis PA-C  [phone]

## 2022-09-30 NOTE — PROGRESS NOTES
Spoke with Wilma Piña RN regarding patient's status. Per RN patient feels \"back to normal,\" with no current deficits. Dr. Dorys Major notified.

## 2022-09-30 NOTE — PROGRESS NOTES
@[id number] Dr. Izzy Flanagan IR notified of 90% carotid stenosis/ stated to send pt over to [facility] ([facility])  @1244 access center notified of transfer to [facility]  90% carotid stenosis  R arm weakness/ L arm ataxia  @1254 Dr. Angelina Justin spoke with Dr. Mery Martinez accepting ER physician  @[phone] access center notified to set up transport /ALS-monitored  @1322 PA ETA 90 minutes @ [phone]

## 2022-09-30 NOTE — CONSULTS
Vascular Surgery Consultation Note    Reason for Consult:  Symptomatic L Carotid Stenosis    HPI :    This is a 69 y.o. male who is admitted to the hospital for treatment of symptomatic left carotid artery disease. Earlier today patient was in the car with his wife, he was driving when all of a sudden he started having trouble speaking. They stopped and she went into the store, when she came back he couldn't remember how to turn the car on. They noticed his right arm was weak and wife states she noticed gait instability when he got out of the car. He denies any numbness or tingling during that time. They went to the hospital for further evaluation. He was noted to have ~90% carotid stenosis L ICA. He had been seen in May 2022 by Dr. Maggi Bowens for asymptomatic carotid stenosis and elected for medical management at that time. He is on eliquis for afib, history of thrombocytopenia therefore deferred antiplatelet therapy at that time. He has history of ulcerative colitis and is having an active flare. He was supposed to hold his eliquis starting today and start mesalamine suppositories. He states his symptoms subjectively have resolved and his speech is fluid. Vascular surgery is consulted for evaluation and treatment of symptomatic left carotid artery stenosis.       ROS : Negative if blank [], Positive if [x]  General Vascular   [] Fevers [] Claudication (Blocks)   [] Chills [] Rest Pain   [] Weight Loss [] Tissue Loss   [] Chest Pain [] Clotting Disorder    [] SOB at rest [] Leg Swelling   [] SOB with exertion [] DVT/PE      [] Nausea    [] Vomitting [x] Stroke/TIA   [x] Abdominal Pain [] Focal weakness   [] Melena [] Slurred Speech   [x] Hematochezia [] Vision Changes   [] Hematuria    [] Dysuria [] Hx of Central Catheters   [x] Wears Glasses/Contacts  [] Dialysis and If so date initiated   [] Blindness     [] Left Hand Dominant   [] Difficulty swallowing        Past Medical History:   Diagnosis Date    SHIVAM (cerebral atherosclerosis)     Coronary atherosclerosis of native coronary artery     COVID     Diabetes mellitus ([facility] [address]     Dizziness     History of tobacco use 2022    Hyperlipidemia     Hypertension     Left carotid stenosis 2022    Ulcerative colitis [facility])         Past Surgical History:   Procedure Laterality Date    COLONOSCOPY      DIAGNOSTIC CARDIAC CATH LAB PROCEDURE      SIGMOIDOSCOPY       Current Medications:               Allergies:  Sulfa antibiotics    Social History     Socioeconomic History    Marital status:      Spouse name: Not on file    Number of children: Not on file    Years of education: Not on file    Highest education level: Not on file   Occupational History    Not on file   Tobacco Use    Smoking status: Former     Packs/day: 1.00     Types: Cigarettes     Start date: 1972     Quit date: 1982     Years since quittin.7    Smokeless tobacco: Never   Vaping Use    Vaping Use: Never used   Substance and Sexual Activity    Alcohol use: Yes     Comment: occasionally    Drug use: No    Sexual activity: Never   Other Topics Concern    Not on file   Social History Narrative    Not on file     Social Determinants of Health     Financial Resource Strain: Not on file   Food Insecurity: Not on file   Transportation Needs: Not on file   Physical Activity: Not on file   Stress: Not on file   Social Connections: Not on file   Intimate Partner Violence: Not on file   Housing Stability: Not on file        Family History   Problem Relation Age of Onset    Cancer Mother        PHYSICAL EXAM:    BP (!) 174/93   Pulse 78   Temp 97.4 °F (36.3 °C) (Oral)   Resp 20   SpO2 99%   CONSTITUTIONAL:  awake, alert, cooperative, no apparent distress, and appears stated age  II: pupils equal, round, reactive to light and accommodation , III,VII: ptosis absent, III,IV,VI: extraocular muscles eye movement normal, V: facial light touch sensation normal bilaterally, VII: facial muscle function for additional procedures, and death were explained to the patient. We specifically discussed risk of stroke from the procedure as well as the greater risk of stroke without intervention. All questions were answered.  The patient understands and agrees to proceed with the procedure.   - NPOMN 10/3      Marc Guerrero MD

## 2022-10-01 PROBLEM — G45.9 TIA (TRANSIENT ISCHEMIC ATTACK): Status: ACTIVE | Noted: 2022-10-01

## 2022-10-01 LAB
CHOLESTEROL, TOTAL: 128 MG/DL (ref 0–199)
HBA1C MFR BLD: 5.8 % (ref 4–5.6)
HDLC SERPL-MCNC: 46 MG/DL
LDL CHOLESTEROL CALCULATED: 49 MG/DL (ref 0–99)
METER GLUCOSE: 103 MG/DL (ref 74–99)
METER GLUCOSE: 127 MG/DL (ref 74–99)
METER GLUCOSE: 209 MG/DL (ref 74–99)
METER GLUCOSE: 76 MG/DL (ref 74–99)
TRIGL SERPL-MCNC: 166 MG/DL (ref 0–149)
VLDLC SERPL CALC-MCNC: 33 MG/DL

## 2022-10-01 PROCEDURE — 2580000003 HC RX 258: Performed by: INTERNAL MEDICINE

## 2022-10-01 PROCEDURE — 99223 1ST HOSP IP/OBS HIGH 75: CPT | Performed by: INTERNAL MEDICINE

## 2022-10-01 PROCEDURE — 82962 GLUCOSE BLOOD TEST: CPT

## 2022-10-01 PROCEDURE — 6360000002 HC RX W HCPCS: Performed by: INTERNAL MEDICINE

## 2022-10-01 PROCEDURE — 6370000000 HC RX 637 (ALT 250 FOR IP): Performed by: INTERNAL MEDICINE

## 2022-10-01 PROCEDURE — APPSS60 APP SPLIT SHARED TIME 46-60 MINUTES: Performed by: NURSE PRACTITIONER

## 2022-10-01 PROCEDURE — 2060000000 HC ICU INTERMEDIATE R&B

## 2022-10-01 PROCEDURE — S5553 INSULIN LONG ACTING 5 U: HCPCS | Performed by: INTERNAL MEDICINE

## 2022-10-01 PROCEDURE — 80061 LIPID PANEL: CPT

## 2022-10-01 PROCEDURE — 36415 COLL VENOUS BLD VENIPUNCTURE: CPT

## 2022-10-01 PROCEDURE — 99222 1ST HOSP IP/OBS MODERATE 55: CPT | Performed by: PHYSICIAN ASSISTANT

## 2022-10-01 PROCEDURE — 83036 HEMOGLOBIN GLYCOSYLATED A1C: CPT

## 2022-10-01 PROCEDURE — 6360000002 HC RX W HCPCS: Performed by: SURGERY

## 2022-10-01 RX ORDER — MESALAMINE 1000 MG/1
1000 SUPPOSITORY RECTAL NIGHTLY
Status: DISCONTINUED | OUTPATIENT
Start: 2022-10-01 | End: 2022-10-05 | Stop reason: HOSPADM

## 2022-10-01 RX ORDER — DEXTROSE MONOHYDRATE 100 MG/ML
INJECTION, SOLUTION INTRAVENOUS CONTINUOUS PRN
Status: DISCONTINUED | OUTPATIENT
Start: 2022-10-01 | End: 2022-10-05 | Stop reason: HOSPADM

## 2022-10-01 RX ORDER — DOXAZOSIN 2 MG/1
2 TABLET ORAL EVERY MORNING
Status: DISCONTINUED | OUTPATIENT
Start: 2022-10-01 | End: 2022-10-05 | Stop reason: HOSPADM

## 2022-10-01 RX ORDER — TERAZOSIN 1 MG/1
2 CAPSULE ORAL EVERY MORNING
Status: DISCONTINUED | OUTPATIENT
Start: 2022-10-01 | End: 2022-10-01 | Stop reason: CLARIF

## 2022-10-01 RX ADMIN — INSULIN GLARGINE-YFGN 26 UNITS: 100 INJECTION, SOLUTION SUBCUTANEOUS at 09:43

## 2022-10-01 RX ADMIN — ENOXAPARIN SODIUM 80 MG: 100 INJECTION SUBCUTANEOUS at 20:17

## 2022-10-01 RX ADMIN — DOXAZOSIN 2 MG: 4 TABLET ORAL at 12:57

## 2022-10-01 RX ADMIN — PRAVASTATIN SODIUM 20 MG: 20 TABLET ORAL at 20:18

## 2022-10-01 RX ADMIN — METOPROLOL SUCCINATE 50 MG: 50 TABLET, FILM COATED, EXTENDED RELEASE ORAL at 09:34

## 2022-10-01 RX ADMIN — BALSALAZIDE DISODIUM 2250 MG: 750 CAPSULE ORAL at 20:16

## 2022-10-01 RX ADMIN — INSULIN LISPRO 2 UNITS: 100 INJECTION, SOLUTION INTRAVENOUS; SUBCUTANEOUS at 17:55

## 2022-10-01 RX ADMIN — ENOXAPARIN SODIUM 80 MG: 100 INJECTION SUBCUTANEOUS at 09:40

## 2022-10-01 RX ADMIN — DOXAZOSIN 4 MG: 4 TABLET ORAL at 22:23

## 2022-10-01 RX ADMIN — ASPIRIN 81 MG: 81 TABLET, COATED ORAL at 09:34

## 2022-10-01 RX ADMIN — SODIUM CHLORIDE, PRESERVATIVE FREE 10 ML: 5 INJECTION INTRAVENOUS at 20:18

## 2022-10-01 RX ADMIN — LISINOPRIL 20 MG: 20 TABLET ORAL at 09:33

## 2022-10-01 RX ADMIN — SODIUM CHLORIDE, PRESERVATIVE FREE 10 ML: 5 INJECTION INTRAVENOUS at 09:36

## 2022-10-01 RX ADMIN — AMLODIPINE BESYLATE 10 MG: 10 TABLET ORAL at 09:33

## 2022-10-01 ASSESSMENT — PAIN SCALES - GENERAL: PAINLEVEL_OUTOF10: 0

## 2022-10-01 NOTE — PROGRESS NOTES
Ki Albarran was ordered Balsalazide [employer]) which is a nonformulary medication. This medication will need to be supplied by the patient as the pharmacy does not carry this non-formulary medication. If the medication has not been administered by 1400 on the following day from the time the order was placed, a pharmacist will follow-up with the nurse of the patient to assess the capability of the patient to bring in the medication. If it is determined that the patient cannot supply the medication and it is not available to be dispensed from the pharmacy, the provider will be notified.

## 2022-10-01 NOTE — ED NOTES
Nurse to nurse called to the unit. SBAR faxed. Patient in transport to go to the unit.       Carlos A Malhotra RN  09/30/22 2026

## 2022-10-01 NOTE — CONSULTS
Inpatient Cardiology Consultation      Reason for Consult:  Preoperative Risk Stratification     Consulting Physician: Dr. Yoav Calabrese     Requesting Physician:  Dr. Beverly Barrios    Date of Consultation: 10/1/2022    HISTORY OF PRESENT ILLNESS:   Mr. Bird Kilgore is a 69year old male who follows with Dr. Roldan Justin. He was most recently seen in the office with Dr. Roldan Justin on 10/05/2021. His medical history includes Persistent Atrial Fibrillation, chronic anticoagulation with Eliquis, moderate CAD per cardiac catheterization in 2005, HTN, HLD, T2DM, Ulcerative Colitis, and thrombocytopenia. Mr. Bird Kilgore presented to [facility] ED on 09/30/2022 with complaints of RUE weakness. He states that prior to presentation, he was evaluated at [facility] on 09/30/2022 for management of his Ulcerative Colitis. He states that more recently he has been having blood tinged stools and was instructed to hold his Eliquis until further notice (reportedly [facility] was going to reevaluate via telephone to discuss resuming Eliquis). He states that upon driving home [id number] he stopped at [employer]. He states that when his wife came into the car, \"my right arm was weak and she told me to go and I went blank, I couldn't figure out what to do\". He denies accompanying chest pain or dyspnea. According to ED documentation, Mr. Bird Kilgore' wife reported that he was driving and suddenly was having difficulty speaking and finding words (he cannot recall this). His wife went into the store and when she returned, he was unable to turn the car on and had right arm weakness. She stated that his gait was unsteady as well. The episode lasted 20 minutes until he presented to [facility] ED. Upon arrival to the ED his VS were 97.3-81-/% RA. EKG Atrial Fibrillation with CVR. CTA of the neck with 90% proximal left carotid stenosis and 50% right sided carotid stenosis. He was transferred to [facility] for evaluation with IR.  Upon evaluation with IR (Dr. Georgia Rubalcava) there was consideration for CEA with Vascular Surgery vs angiogram with carotid stenting. Subsequently, Vascular Surgery was consulted with recommendation for left CEA on 10/03/2022. Cardiology was consulted for preoperative risk stratification. Currently, Mr. Keon Mejia is sitting up in bed. Denies chest pain and dyspnea. States that he is active, and walks \"11-12,000 steps a day, either outside or on the treadmill\". He states that with walking he has no chest pain or dyspnea. He states that he is also able to walk up and down a flight of stairs without chest pain or dyspnea. Please note: past medical records were reviewed per electronic medical record (EMR) - see detailed reports under Past Medical/ Surgical History. Past Medical and Surgical History:    CAD  Cardiac catheterization 04/15/2005: Moderate coronary atherosclerosis in the RCA, LCx, and LAD arteries. Normal LV systolic function. LVEF 65%. Exercise Myoview 04/21/2008: 13 METs, no chest pain, normal ECG, normal scan. TTE 08/17/2020 (Dr. Debbie Pa): Normal LV size. Mild concentric LVH. EF 55%. Mild MR. HTN  HLD, tolerates Pravastatin   Intolerant to Crestor (Myalgias)  Intolerant to Lipitor (possible LFT abnormality)  T2DM, on Insulin  Remote tobacco abuse   Persistent, Longstanding Atrial Fibrillation  Chronic anticoagulation with Eliquis  Holter Monitor 48 Hour 05/04/2022-05/05/2022 (interpreted by Dr. Kendra Henson): Atrial fibrillation burden equals 100% and ventricular rate of  bpm (mean: 75 bpm). Ventricular ectopy (VE) burden less than 1%. 1 patient event during AF with ventricular rate of 74 bpm.  No SVT, VT, high-grade AV block, or significant pauses. Ulcerative Colitis   Colonoscopy 07/01/2022 ([facility]): The entire examined colon is normal.  The examined portion of the ileum was normal.  No specimens collected.   Testicular Hypofunction, on Testosterone injections  Erectile Dysfunction, prn Cialis   Adrenal Adenoma  Hx Thombocytopenia  Covid 19 Infection in 02/2021  Bilateral Carotid Stenosis. Follows with Dr. María Ba  Bilateral Carotid Ultrasound 05/09/2022 ([facility]): 70-89% stenosis left ICA. Evaluation somewhat limited due to extensive shadowing from densely calcified plaque. CTA/MRA recommended for further evaluation. Less than 50% stenosis right ICA. Medications Prior to admit:  Prior to Admission medications    Medication Sig Start Date End Date Taking?  Authorizing Provider   apixaban (ELIQUIS) 5 MG TABS tablet Take 5 mg by mouth 2 times daily    Historical Provider, MD   Cholecalciferol (VITAMIN D3) 50 MCG (2000 UT) TABS Take 2,000-6,000 Units by mouth daily    Historical Provider, MD   potassium chloride (KLOR-CON M) 20 MEQ extended release tablet Take 60 mEq by mouth daily    Historical Provider, MD   hydrocortisone (ANUSOL-HC) 25 MG suppository Place 25 mg rectally 2 times daily    Historical Provider, MD   insulin lispro (HUMALOG) 100 UNIT/ML injection vial Inject into the skin 3 times daily (before meals) PER SLIDING SCALE    Historical Provider, MD   Probiotic Product (PROBIOTIC DAILY) CAPS Take 1 capsule by mouth nightly    Historical Provider, MD   vedolizumab (ENTYVIO) 300 MG injection Infuse 300 mg intravenously every 28 days GIVEN AT [facility] [place] 8/25/21   Historical Provider, MD   terazosin (HYTRIN) 2 MG capsule Take 2 mg by mouth every morning **SEE OTHER ORDER**    Historical Provider, MD   Turmeric 500 MG CAPS Take 500 mg by mouth 2 times daily    Historical Provider, MD   metoprolol succinate (TOPROL XL) 50 MG extended release tablet Take 50 mg by mouth every morning 4/7/17   Historical Provider, MD   CIALIS 20 MG tablet Take 20 mg by mouth as needed for Erectile Dysfunction 3/2/17   Historical Provider, MD   amLODIPine-benazepril (LOTREL) 10-20 MG per capsule Take 1 capsule by mouth every morning    Historical Provider, MD   terazosin (HYTRIN) 5 MG capsule Take 5 mg by mouth nightly     Historical Provider, MD   hydrochlorothiazide (HYDRODIURIL) 25 MG tablet Take 25 mg by mouth every morning    Historical Provider, MD   pravastatin (PRAVACHOL) 20 MG tablet Take 20 mg by mouth nightly     Historical Provider, MD   balsalazide (COLAZAL) 750 MG capsule Take 2,250 mg by mouth 3 times daily    Historical Provider, MD   Testosterone 20.25 MG/1.25GM (1.62%) GEL Place 1 Package onto the skin daily    Historical Provider, MD   insulin glargine (LANTUS) 100 UNIT/ML injection Inject 26 Units into the skin every morning    Historical Provider, MD       Current Medications:    Current Facility-Administered Medications: aspirin EC tablet 81 mg, 81 mg, Oral, Daily  balsalazide (COLAZAL) capsule 2,250 mg (Patient Supplied), 2,250 mg, Oral, TID  hydrocortisone (ANUSOL-HC) suppository 25 mg, 25 mg, Rectal, BID  insulin glargine-yfgn (SEMGLEE-YFGN) injection vial 26 Units, 26 Units, SubCUTAneous, QAM  metoprolol succinate (TOPROL XL) extended release tablet 50 mg, 50 mg, Oral, QAM  pravastatin (PRAVACHOL) tablet 20 mg, 20 mg, Oral, Nightly  sodium chloride flush 0.9 % injection 5-40 mL, 5-40 mL, IntraVENous, 2 times per day  sodium chloride flush 0.9 % injection 5-40 mL, 5-40 mL, IntraVENous, PRN  0.9 % sodium chloride infusion, , IntraVENous, PRN  ondansetron (ZOFRAN-ODT) disintegrating tablet 4 mg, 4 mg, Oral, Q8H PRN **OR** ondansetron (ZOFRAN) injection 4 mg, 4 mg, IntraVENous, Q6H PRN  polyethylene glycol (GLYCOLAX) packet 17 g, 17 g, Oral, Daily PRN  acetaminophen (TYLENOL) tablet 650 mg, 650 mg, Oral, Q6H PRN **OR** acetaminophen (TYLENOL) suppository 650 mg, 650 mg, Rectal, Q6H PRN  insulin lispro (HUMALOG) injection vial 0-8 Units, 0-8 Units, SubCUTAneous, TID WC  insulin lispro (HUMALOG) injection vial 0-4 Units, 0-4 Units, SubCUTAneous, Nightly  enoxaparin (LOVENOX) injection 80 mg, 1 mg/kg, SubCUTAneous, BID  amLODIPine (NORVASC) tablet 10 mg, 10 mg, Oral, Daily **AND** lisinopril (PRINIVIL;ZESTRIL) tablet 20 mg, 20 mg, Oral, Daily  doxazosin (CARDURA) tablet 4 mg, 4 mg, Oral, Nightly    Allergies:  Sulfa antibiotics    Social History:    Quit smoking in 1982. Prior to that smoked 1ppd for 10 years. Drinks alcohol socially. Denies illicit drug use. Caffeine intake includes coffee daily. Family History:   Please note this information was not obtained at this time, as it is limited in nature due to the patient's advanced age. REVIEW OF SYSTEMS:     Constitutional: Denies fevers, chills, night sweats, and fatigue  HEENT: Denies headaches, nose bleeds, and blurred vision,oral pain, abscess or lesion. Musculoskeletal: Denies falls, pain to BLE with ambulation and edema to BLE. Complains of right sided weakness and confusion-see HPI. Neurological: Denies dizziness and lightheadedness, numbness and tingling  Cardiovascular: Denies chest pain, palpitations, and feelings of heart racing. Respiratory: Denies orthopnea and PND  Gastrointestinal: Denies heartburn, nausea/vomiting, diarrhea and constipation, black/bloody, and tarry stools. Genitourinary: Denies dysuria and hematuria  Hematologic: Denies excessive bruising or bleeding  Lymphatic: Denies lumps and bumps to neck, axilla, breast, and groin  Endocrine: Denies excessive thirst. Denies intolerance to hot and cold  Psychiatric: Denies anxiety and depression. PHYSICAL EXAM:   BP (!) 155/90   Pulse 75   Temp 98.1 °F (36.7 °C) (Oral)   Resp 16   Ht 6' 1\" (1.854 m)   Wt 167 lb 9.6 oz (76 kg)   SpO2 98%   BMI 22.11 kg/m²   CONST:  Well developed, well nourished elderly male who appears stated age. Awake, alert, cooperative, no apparent distress  HEENT:   Head- Normocephalic, atraumatic   Eyes- Conjunctivae pink, anicteric  Throat- Oral mucosa pink and moist  Neck-  No stridor, trachea midline, no jugular venous distention. No adenopathy   CHEST: Chest symmetrical and non-tender to palpation.  No accessory muscle use or intercostal retractions  RESPIRATORY: Lung sounds - clear throughout fields CARDIOVASCULAR:     No carotid bruit  Heart Inspection- shows no noted pulsations  Heart Palpation- no heaves or thrills; PMI is non-displaced   Heart Ausculation- Irregular rate and rhythm, no murmur. No s3, or rub   PV: No lower extremity edema. No varicosities. Pedal pulses palpable, no clubbing or cyanosis   ABDOMEN: Soft, non-tender to light palpation. Bowel sounds present. No palpable masses no organomegaly; no abdominal bruit  MS: Good muscle strength and tone. No atrophy or abnormal movements. : Deferred  SKIN: Warm and dry no statis dermatitis or ulcers   NEURO / PSYCH: Oriented to person, place and time. Speech clear and appropriate. Follows all commands. Pleasant affect     DATA:    ECG: As above  Tele strips: Atrial Fibrillation with HR as high as 140s since admission. Currently Atrial Fibrillation with HR in the 90s      Diagnostic:  Labs:   CBC:   Recent Labs     09/30/22  1030   WBC 6.1   HGB 12.7   HCT 36.4*        BMP:   Recent Labs     09/30/22  1030      K 4.0   CO2 27   BUN 12   CREATININE 0.9   LABGLOM >60   CALCIUM 9.3     PT/INR:   Recent Labs     09/30/22  1030   PROTIME 13.4*   INR 1.2     APTT:  Recent Labs     09/30/22  1030   APTT 32.0   LIVER PROFILE:  Recent Labs     09/30/22  1030   AST 17   ALT <5   LABALBU 4.5     09/30/2022 CT Head WO contrast:   No acute intracranial abnormality. 09/30/2022 CTA Neck W contrast:  1. No perfusion mismatch. 2. No intracranial large vessel occlusion or aneurysm. 3. Severe stenosis of the intracranial V4 segment of the right vertebral artery. Moderate stenosis of the intracranial left vertebral artery. 4. Severe high-grade stenosis measuring at least 90% within the proximal left cervical internal carotid artery at the origin. 5. Approximately 50% diameter stenosis involving the right proximal cervical ICA secondary to atherosclerosis. 09/30/2022 CTA Head W contrast:  1. No perfusion mismatch.   2. No intracranial large

## 2022-10-01 NOTE — H&P
[address]                  [address] [id number],  [facility]                              HISTORY AND PHYSICAL    PATIENT NAME: Jennifer Pickering                      :        1955  MED REC NO:   [id number]                            ROOM:       [facility]  ACCOUNT NO:   [de-identified]                           ADMIT DATE: 2022  PROVIDER:     Ariella Alcantara DO    CHIEF COMPLAINT:  Right arm weakness. HISTORY OF PRESENT ILLNESS:  The patient is a 64-year-old  male  who presented to the emergency room with acute onset of altered mental  status and right arm weakness about 10 o'clock in the morning. He came  to the emergency room. His symptoms resolved while in the emergency  room. Diagnostic evaluation revealed 50% stenosis right internal  carotid, 90% stenosis left internal carotid, severe stenosis right  vertebral artery. He was admitted for further evaluation and treatment. PAST MEDICAL HISTORY:  Atrial fibrillation; chronic Eliquis therapy;  hypertension; diabetes mellitus, on insulin; ulcerative colitis;  elevated cholesterol. PAST SURGICAL HISTORY:  Heart catheterization, bilateral shoulder  rotator cuff surgery. SOCIAL HISTORY:  The patient quit tobacco in . Social alcohol. REVIEW OF SYSTEMS:  Remarkable for above-stated chief complaint plus  allergy to SULFA. MEDICATIONS PRIOR TO ADMISSION:  Eliquis, vitamin D, potassium chloride,  Humalog, probiotic, Entyvio, Hytrin, turmeric, Toprol-XL, Cialis,  Lotrel, Hytrin, hydrochlorothiazide, Pravachol, Colazal, testosterone,  Lantus, mesalamine suppository. PRIMARY CARE PHYSICIAN:  Ama Sommer MD    PHYSICAL EXAMINATION:  GENERAL APPEARANCE:  Reveals a 64-year-old  male who is alert  and oriented x3, cooperative and a good historian. VITAL SIGNS:  On admission, temperature 97.4, pulse 74, respirations 15,  blood pressure 173/97.   HEENT:  Head:  Normocephalic, atraumatic. Eyes:  Pupils equal and  reactive to light. Extraocular muscles intact. Fundi not well  visualized. Nose:  No obstruction, polyp or discharge noted. Mouth:   Mucosa without lesion. Pharynx:  Noninjected without exudate. NECK:  Supple. No JVD. No thyromegaly. No carotid bruits. HEART:  Irregularly irregular without murmur. LUNGS:  Clear to auscultation bilaterally. ABDOMEN:  Positive bowel sounds. Soft. Nontender. No rebound. No  guarding. No hepatosplenomegaly. No masses. BACK:  With minimal increased thoracic kyphosis. EXTREMITIES:  Good strength in the upper and lower extremities. LYMPH NODES:  No adenopathy noted. SKIN:  Without rash or lesion. IMPRESSION:  TIA, severe left internal carotid artery stenosis, right  internal carotid artery stenosis, severe stenosis right vertebral  artery, insulin-requiring diabetes mellitus, chronic Eliquis therapy,  chronic atrial fibrillation, hypertension, hyperlipidemia, ulcerative  colitis. PLAN:  Admit. Vascular to see. Hold Eliquis. Lovenox while Eliquis on  hold. Continue aspirin preop. DISCHARGE PLAN:  Home when stable.         Beckie Hoff DO    D: 10/01/2022 10:15:18       T: 10/01/2022 10:17:47     [id number]  Job#: [id number]     Doc#: [id number]    CC:

## 2022-10-01 NOTE — PROGRESS NOTES
Message sent to Nila Valiente with [facility] cardiology that pt family is in room and is asking to speak with Dr. Arlene Liao.

## 2022-10-01 NOTE — PROGRESS NOTES
[facility]  Neurology Consult    Date:  10/1/2022  Patient Name:  Juana Herrera  YOB: 1955  MRN: [id number]     PCP:  John Houser MD   Referring:  No ref. provider found      Chief Complaint: TIA    History obtained from: Patient and chart    Assessment  Left-sided TIA versus small stroke with rapid resolution secondary to symptomatic left ICA stenosis      Plan  Continue aspirin per vascular surgery recommendations  CEA planned with vascular surgery on 10/3  Eliquis on hold for upcoming surgery, currently on therapeutic Lovenox for his history of A. Fib  Statin therapy with goal LDL less than 70  Follow-up MRI of the brain and echocardiogram for completion of work-up  No therapy needs at this time  Stroke clinic follow-up    History of Present Illness:  Juana Herrera is a 69 y.o. right handed male presenting for evaluation of tia. Patient presented with altered mental status and right arm weakness. Symptoms resolved in the ED. CTA of the head and neck revealed 90% stenosis of the left ICA. Vascular has already evaluated and plans for a left CEA on 10/3. He does have a history of A. fib and is on Eliquis. This has been held for upcoming surgery. He is currently on therapeutic Lovenox. He is also been started on daily baby aspirin. He was on Pravachol 20 mg prior to admission and this was continued. LDL is 49. A1c is 5.8. Blood pressure on admission was 173/97.     Painfully, he has returned to baseline and has no current neurologic symptoms    No chest pain or palpitations  No SOB  No vertigo, lightheadedness or loss of consciousness  No falls, tripping or stumbling  No incontinence of bowels or bladder  No itching or bruising appreciated  No numbness, tingling or focal arm/leg weakness    ROS otherwise negative      Medical History:   Past Medical History:   Diagnosis Date    SHIVAM (cerebral atherosclerosis)     Coronary atherosclerosis of native coronary artery COVID     Diabetes mellitus ([facility]     Dizziness     History of tobacco use 2022    Hyperlipidemia     Hypertension     Left carotid stenosis 2022    Ulcerative colitis ([facility]         Surgical History:   Past Surgical History:   Procedure Laterality Date    COLONOSCOPY      DIAGNOSTIC CARDIAC CATH LAB PROCEDURE      SIGMOIDOSCOPY          Family History:  Family History   Problem Relation Age of Onset    Cancer Mother        Social History:  Social History     Tobacco Use    Smoking status: Former     Packs/day: 1.00     Types: Cigarettes     Start date: 1972     Quit date: 1982     Years since quittin.7    Smokeless tobacco: Never   Vaping Use    Vaping Use: Never used   Substance Use Topics    Alcohol use: Yes     Comment: occasionally    Drug use: No        Current Medications:      Current Facility-Administered Medications   Medication Dose Route Frequency Provider Last Rate Last Admin    glucose chewable tablet 16 g  4 tablet Oral PRN Stella Breen, DO        dextrose bolus 10% 125 mL  125 mL IntraVENous PRN Stella Breen DO        Or    dextrose bolus 10% 250 mL  250 mL IntraVENous PRN Stella Breen, DO        glucagon (rDNA) injection 1 mg  1 mg SubCUTAneous PRN Stella Breen DO        dextrose 10 % infusion   IntraVENous Continuous PRN Stella Breen DO        mesalamine (CANASA) suppository 1,000 mg  (Patient Supplied)  1,000 mg Rectal Nightly Stella Breen DO        doxazosin (CARDURA) tablet 2 mg  2 mg Oral QAM Stella Breen DO   2 mg at 10/01/22 1257    aspirin EC tablet 81 mg  81 mg Oral Daily Stella Breen DO   81 mg at 10/01/22 9339    balsalazide (COLAZAL) capsule 2,250 mg  (Patient Supplied)  2,250 mg Oral TID Stella Breen DO        hydrocortisone (ANUSOL-HC) suppository 25 mg  25 mg Rectal BID Stella Breen DO   25 mg at 22 2332    insulin glargine-yfgn (SEMGLEE-YFGN) injection vial 26 Units  26 Units SubCUTAneous JUVENCIO Wilcox Jamshid, DO   26 Units at 10/01/22 0943    metoprolol succinate (TOPROL XL) extended release tablet 50 mg  50 mg Oral QAM Joon Breen, DO   50 mg at 10/01/22 3650    pravastatin (PRAVACHOL) tablet 20 mg  20 mg Oral Nightly Joon Breen, DO   20 mg at 09/30/22 2332    sodium chloride flush 0.9 % injection 5-40 mL  5-40 mL IntraVENous 2 times per day Joon Breen, DO   10 mL at 10/01/22 0481    sodium chloride flush 0.9 % injection 5-40 mL  5-40 mL IntraVENous PRN Joon Breen, DO        0.9 % sodium chloride infusion   IntraVENous PRN Joon Breen, DO        ondansetron (ZOFRAN-ODT) disintegrating tablet 4 mg  4 mg Oral Q8H PRN Joon Breen,         Or    ondansetron [facility]) injection 4 mg  4 mg IntraVENous Q6H PRN Joon Breen, DO        polyethylene glycol (GLYCOLAX) packet 17 g  17 g Oral Daily PRN Joon Breen, DO        acetaminophen (TYLENOL) tablet 650 mg  650 mg Oral Q6H PRN Joon Breen, DO        Or    acetaminophen (TYLENOL) suppository 650 mg  650 mg Rectal Q6H PRN Joon Breen, DO        insulin lispro (HUMALOG) injection vial 0-8 Units  0-8 Units SubCUTAneous TID WC Joon Breen, DO        insulin lispro (HUMALOG) injection vial 0-4 Units  0-4 Units SubCUTAneous Nightly José Breen, DO        enoxaparin (LOVENOX) injection 80 mg  1 mg/kg SubCUTAneous BID Mckinley Rodriguez MD   80 mg at 10/01/22 0940    amLODIPine (NORVASC) tablet 10 mg  10 mg Oral Daily Joon Breen, DO   10 mg at 10/01/22 8010    And    lisinopril (PRINIVIL;ZESTRIL) tablet 20 mg  20 mg Oral Daily Joon Breen, DO   20 mg at 10/01/22 3327    doxazosin (CARDURA) tablet 4 mg  4 mg Oral Nightly Joon Breen, DO   4 mg at 09/30/22 2332        Allergies:       Allergies   Allergen Reactions    Sulfa Antibiotics Other (See Comments)     MOUTH BLISTERS        Physical Examination  Vitals   Vitals:    09/30/22 2000 09/30/22 2123 09/30/22 2146 10/01/22 0915   BP: (!) 156/79 (!) 155/90  (!) 157/93   Pulse: 74 73 75 77   Resp: 14 16  16   Temp: 98 °F (36.7 °C) 98.1 °F (36.7 °C)  97.6 °F (36.4 °C)   TempSrc: Oral Oral  Temporal   SpO2: 99% 98%     Weight:  167 lb 9.6 oz (76 kg)     Height:  6' 1\" (1.854 m)          General: Patient appears in no acute distress. Awake and oriented x 4  HEENT: Normocephalic, atraumatic  Chest: effort normal   Heart: NSR on tele   Extremities/Peripheral vascular: No edema/swelling noted. No cold limbs noted. Neurologic Examination    Mental Status  Alert, and oriented to person, place and time. Speech is fluent with intact comprehension. No evidence of memory impairment. Attention and concentration appeared normal.     Cranial Nerves  II. Visual fields full to confrontation bilaterally. III, IV, VI: Pupils equally round and reactive to light, 3 to 2 mm bilaterally. EOMs: full, no nystagmus. V. Facial sensation intact to light touch bilaterally  VII: Facial movements symmetric and strong  VIII: Hearing intact to voice  IX,X: Palate elevates symmetrically.  No dysarthria  XI: Sternocleidomastoid and trapezius 5/5 bilaterally   XII: Tongue is midline    Motor     Right Left   Right Left   Deltoid 5 5  Hip Flexion 5 5   Biceps      5  5  Knee Extension 5 5   Triceps 5 5  Knee Flexion 5 5   Handgrip 5 5  Ankle Dorsiflexion 5 5       Ankle Plantarflexion 5 5     Tone: Normal in all four limbs    Bulk: Normal in all four limbs with no evidence of atrophy    Pronator drift: absent bilaterally    Sensation  Light Touch: Intact distally in all four limbs  Pinprick: Intact distally in all four limbs  Vibration: Intact distally in all four limbs  Proprioception: Intact distally in all four limbs    Reflexes     Right Left   Biceps 2 2   Brachioradialis 2 2   Triceps 2 2   Patellar 2 2   Achilles 2 2   ankle clonus none none   Babinski absent absent     Coordination  Rapid alternating movements normal in bilateral upper extremities  Finger to nose testing normal bilaterally  Heel to shin testing normal bilaterally    Gait  Deferred for safety/fall consideration      Labs  Recent Labs     09/30/22  1030 09/30/22  1035 10/01/22  1346     --   --    K 4.0  --   --      --   --    CO2 27  --   --    BUN 12  --   --    CREATININE 0.9  --   --    GLUCOSE 285* 282  --    CALCIUM 9.3  --   --    PROT 6.8  --   --    LABALBU 4.5  --   --    BILITOT 0.6  --   --    ALKPHOS 90  --   --    AST 17  --   --    ALT <5  --   --    WBC 6.1  --   --    RBC 4.28  --   --    HGB 12.7  --   --    HCT 36.4*  --   --    MCV 85.0  --   --    MCH 29.7  --   --    MCHC 34.9*  --   --    RDW 13.3  --   --      --   --    MPV 9.4  --   --    HDL  --   --  46   LDLCALC  --   --  49   LABA1C  --   --  5.8*       Imaging    CTA head/neck  1. No perfusion mismatch. 2. No intracranial large vessel occlusion or aneurysm. 3. Severe stenosis of the intracranial V4 segment of the right vertebral  artery. Moderate stenosis of the intracranial left vertebral artery. 4. Severe high-grade stenosis measuring at least 90% within the proximal left  cervical internal carotid artery at the origin. 5. Approximately 50% diameter stenosis involving the right proximal cervical  ICA secondary to atherosclerosis.       I have personally reviewed the following images: CT head       Electronically signed by ANDREW Fernandez on 10/1/2022 at 5:24 PM

## 2022-10-01 NOTE — CONSULTS
Patient seen and examined. Chart, labs, imaging studies, EKG and rhythm strips reviewed. Full consult to follow. We were asked to see the patient for preoperative risk stratification. IMPRESSION:  Patient should be at low risk from Cardiology standpoint for CEA. His daily activities easily exceed 4 METs (reports that he walks 12,000 steps daily. TIA presenting with right arm weakness, resolved   90% Proximal left carotid artery stenosis   CAD clinically stable   Permanent Atrial Fibrillation. Has been off oral anticoagulation because of GI bleeding. No evidence of anemia on CBC this admission   Ulcerative Colitis   HTN  Insulin requiring DM   HLD, on Pravastatin (intolerant to Crestor and Lipitor)  Hypogonadism/Erectile dysfunction, on Testosterone injections and prn Cialis  Adrenal adenoma   Hx Thrombocytopenia, not on current blood test     PLAN:   Agree with therapeutic dose Lovenox. Consider resuming oral anticoagulation with Eliquis after surgery  Rest of home cardiac medications same  May proceed with CEA without the need for advanced cardiac testing  Cardiology will sign off. Please call if needed.      Electronically signed by Mini Starks MD on 10/1/2022 at 8:24 AM

## 2022-10-02 ENCOUNTER — APPOINTMENT (OUTPATIENT)
Dept: MRI IMAGING | Age: 67
DRG: 038 | End: 2022-10-02
Payer: MEDICARE

## 2022-10-02 ENCOUNTER — ANESTHESIA EVENT (OUTPATIENT)
Dept: OPERATING ROOM | Age: 67
DRG: 038 | End: 2022-10-02
Payer: MEDICARE

## 2022-10-02 LAB
LV EF: 50 %
LVEF MODALITY: NORMAL
METER GLUCOSE: 165 MG/DL (ref 74–99)
METER GLUCOSE: 193 MG/DL (ref 74–99)
METER GLUCOSE: 243 MG/DL (ref 74–99)
METER GLUCOSE: 80 MG/DL (ref 74–99)

## 2022-10-02 PROCEDURE — 6370000000 HC RX 637 (ALT 250 FOR IP): Performed by: INTERNAL MEDICINE

## 2022-10-02 PROCEDURE — 82962 GLUCOSE BLOOD TEST: CPT

## 2022-10-02 PROCEDURE — 2060000000 HC ICU INTERMEDIATE R&B

## 2022-10-02 PROCEDURE — 70551 MRI BRAIN STEM W/O DYE: CPT

## 2022-10-02 PROCEDURE — 2580000003 HC RX 258: Performed by: INTERNAL MEDICINE

## 2022-10-02 PROCEDURE — 93306 TTE W/DOPPLER COMPLETE: CPT

## 2022-10-02 PROCEDURE — 6360000002 HC RX W HCPCS: Performed by: SURGERY

## 2022-10-02 RX ADMIN — DOXAZOSIN 4 MG: 4 TABLET ORAL at 21:05

## 2022-10-02 RX ADMIN — BALSALAZIDE DISODIUM 2250 MG: 750 CAPSULE ORAL at 09:20

## 2022-10-02 RX ADMIN — BALSALAZIDE DISODIUM 2250 MG: 750 CAPSULE ORAL at 14:58

## 2022-10-02 RX ADMIN — SODIUM CHLORIDE, PRESERVATIVE FREE 10 ML: 5 INJECTION INTRAVENOUS at 21:04

## 2022-10-02 RX ADMIN — ENOXAPARIN SODIUM 80 MG: 100 INJECTION SUBCUTANEOUS at 21:02

## 2022-10-02 RX ADMIN — BALSALAZIDE DISODIUM 2250 MG: 750 CAPSULE ORAL at 21:04

## 2022-10-02 RX ADMIN — METOPROLOL SUCCINATE 50 MG: 50 TABLET, FILM COATED, EXTENDED RELEASE ORAL at 09:20

## 2022-10-02 RX ADMIN — MESALAMINE 1000 MG: 1000 SUPPOSITORY RECTAL at 21:04

## 2022-10-02 RX ADMIN — SODIUM CHLORIDE, PRESERVATIVE FREE 10 ML: 5 INJECTION INTRAVENOUS at 09:21

## 2022-10-02 RX ADMIN — ASPIRIN 81 MG: 81 TABLET, COATED ORAL at 09:22

## 2022-10-02 RX ADMIN — DOXAZOSIN 2 MG: 4 TABLET ORAL at 09:19

## 2022-10-02 RX ADMIN — PRAVASTATIN SODIUM 20 MG: 20 TABLET ORAL at 21:05

## 2022-10-02 RX ADMIN — AMLODIPINE BESYLATE 10 MG: 10 TABLET ORAL at 09:19

## 2022-10-02 RX ADMIN — ENOXAPARIN SODIUM 80 MG: 100 INJECTION SUBCUTANEOUS at 09:22

## 2022-10-02 RX ADMIN — LISINOPRIL 20 MG: 20 TABLET ORAL at 09:19

## 2022-10-02 ASSESSMENT — PAIN SCALES - GENERAL
PAINLEVEL_OUTOF10: 0

## 2022-10-02 ASSESSMENT — LIFESTYLE VARIABLES: SMOKING_STATUS: 0

## 2022-10-02 NOTE — ANESTHESIA PRE PROCEDURE
Department of Anesthesiology  Preprocedure Note       Name:  Noris Sales   Age:  69 y.o.  :  1955                                          MRN:  [id number]         Date:  10/2/2022      Surgeon: Leonel Haley):  Monica Maciel MD    Procedure: Procedure(s):  CAROTID ENDARTERECTOMY    Medications prior to admission:   Prior to Admission medications    Medication Sig Start Date End Date Taking?  Authorizing Provider   apixaban (ELIQUIS) 5 MG TABS tablet Take 5 mg by mouth 2 times daily    Historical Provider, MD   Cholecalciferol (VITAMIN D3) 50 MCG ( UT) TABS Take 2,000-6,000 Units by mouth daily    Historical Provider, MD   potassium chloride (KLOR-CON M) 20 MEQ extended release tablet Take 60 mEq by mouth daily    Historical Provider, MD   hydrocortisone (ANUSOL-HC) 25 MG suppository Place 25 mg rectally 2 times daily    Historical Provider, MD   insulin lispro (HUMALOG) 100 UNIT/ML injection vial Inject into the skin 3 times daily (before meals) PER SLIDING SCALE    Historical Provider, MD   Probiotic Product (PROBIOTIC DAILY) CAPS Take 1 capsule by mouth nightly    Historical Provider, MD   vedolizumab (ENTYVIO) 300 MG injection Infuse 300 mg intravenously every 28 days GIVEN AT [facility] [place] [address]   Historical Provider, MD   terazosin (HYTRIN) 2 MG capsule Take 2 mg by mouth every morning **SEE OTHER ORDER**    Historical Provider, MD   Turmeric 500 MG CAPS Take 500 mg by mouth 2 times daily    Historical Provider, MD   metoprolol succinate (TOPROL XL) 50 MG extended release tablet Take 50 mg by mouth every morning 17   Historical Provider, MD   CIALIS 20 MG tablet Take 20 mg by mouth as needed for Erectile Dysfunction 3/2/17   Historical Provider, MD   amLODIPine-benazepril (LOTREL) 10-20 MG per capsule Take 1 capsule by mouth every morning    Historical Provider, MD   terazosin (HYTRIN) 5 MG capsule Take 5 mg by mouth nightly     Historical Provider, MD   hydrochlorothiazide (HYDRODIURIL) 25 MG tablet Take 25 mg by mouth every morning    Historical Provider, MD   pravastatin (PRAVACHOL) 20 MG tablet Take 20 mg by mouth nightly     Historical Provider, MD   balsalazide (COLAZAL) 750 MG capsule Take 2,250 mg by mouth 3 times daily    Historical Provider, MD   Testosterone 20.25 MG/1.25GM (1.62%) GEL Place 1 Package onto the skin daily    Historical Provider, MD   insulin glargine (LANTUS) 100 UNIT/ML injection Inject 26 Units into the skin every morning    Historical Provider, MD       Current medications:    Current Facility-Administered Medications   Medication Dose Route Frequency Provider Last Rate Last Admin    glucose chewable tablet 16 g  4 tablet Oral PRN Rickie Breen DO        dextrose bolus 10% 125 mL  125 mL IntraVENous PRN Pilo Curry DO        Or    dextrose bolus 10% 250 mL  250 mL IntraVENous PRN Rickie Breen DO        glucagon (rDNA) injection 1 mg  1 mg SubCUTAneous PRN Rickie Breen DO        dextrose 10 % infusion   IntraVENous Continuous PRN Rickie Breen DO        mesalamine (CANASA) suppository 1,000 mg  (Patient Supplied)  1,000 mg Rectal Nightly Rickie Breen DO   1,000 mg at 10/01/22 2017    doxazosin (CARDURA) tablet 2 mg  2 mg Oral ALICIAM Rickie Breen DO   2 mg at 10/02/22 0919    perflutren lipid microspheres (DEFINITY) injection 1.65 mg  1.5 mL IntraVENous ONCE PRN ANDREW Castelan        aspirin EC tablet 81 mg  81 mg Oral Daily Rickie Breen DO   81 mg at 10/02/22 6955    balsalazide (COLAZAL) capsule 2,250 mg  (Patient Supplied)  2,250 mg Oral TID Rickie Breen DO   2,250 mg at 10/02/22 1458    hydrocortisone (ANUSOL-HC) suppository 25 mg  25 mg Rectal BID Rickie Breen DO   25 mg at 09/30/22 2332    insulin glargine-yfgn (SEMGLEE-YFGN) injection vial 26 Units  26 Units SubCUTAneous ALICIAM Rickie Breen DO   26 Units at 10/01/22 0943    metoprolol succinate (TOPROL XL) extended release tablet 50 mg  50 mg Oral JUVENCIO Garner Jamshid, DO   50 mg at 10/02/22 0920    pravastatin (PRAVACHOL) tablet 20 mg  20 mg Oral Nightly Mark Breen, DO   20 mg at 10/01/22 2018    sodium chloride flush 0.9 % injection 5-40 mL  5-40 mL IntraVENous 2 times per day Mark Breen, DO   10 mL at 10/02/22 2391    sodium chloride flush 0.9 % injection 5-40 mL  5-40 mL IntraVENous PRN Mark Breen DO        0.9 % sodium chloride infusion   IntraVENous PRN Mark Breen DO        ondansetron (ZOFRAN-ODT) disintegrating tablet 4 mg  4 mg Oral Q8H PRN Mark Breen DO        Or    ondansetron [facility]) injection 4 mg  4 mg IntraVENous Q6H PRN Mark Breen, DO        polyethylene glycol (GLYCOLAX) packet 17 g  17 g Oral Daily PRN Mark Breen, DO        acetaminophen (TYLENOL) tablet 650 mg  650 mg Oral Q6H PRN Mark Breen DO        Or    acetaminophen (TYLENOL) suppository 650 mg  650 mg Rectal Q6H PRN Mark Breen, DO        insulin lispro (HUMALOG) injection vial 0-8 Units  0-8 Units SubCUTAneous TID WC Mark Breen DO   2 Units at 10/01/22 1755    insulin lispro (HUMALOG) injection vial 0-4 Units  0-4 Units SubCUTAneous Nightly Mark Breen DO        enoxaparin (LOVENOX) injection 80 mg  1 mg/kg SubCUTAneous BID Nazia Gutierres MD   80 mg at 10/02/22 5165    amLODIPine (NORVASC) tablet 10 mg  10 mg Oral Daily Mark Breen DO   10 mg at 10/02/22 9691    And    lisinopril (PRINIVIL;ZESTRIL) tablet 20 mg  20 mg Oral Daily Mark Breen, DO   20 mg at 10/02/22 0919    doxazosin (CARDURA) tablet 4 mg  4 mg Oral Nightly Mark Breen, DO   4 mg at 10/01/22 2223       Allergies:     Allergies   Allergen Reactions    Sulfa Antibiotics Other (See Comments)     MOUTH BLISTERS       Problem List:    Patient Active Problem List   Diagnosis Code    Coronary atherosclerosis of native coronary artery I25.10    Hypertension I10    Hyperlipidemia E78.5    Diabetes [facility] E11.9    Longstanding persistent atrial fibrillation (HCC) I48.11    History of echocardiogram Z92.89    COVID-19 U07.1    Adrenal adenoma D35.00    Testicular hypofunction E29.1    [facility] K51.90    Thrombocytopenia (HCC) D69.6    Left carotid stenosis I65.22    History of tobacco use Z87.891    RUE weakness R29.898    TIA (transient ischemic attack) G45.9       Past Medical History:        Diagnosis Date    SHIVAM (cerebral atherosclerosis)     Coronary atherosclerosis of native coronary artery     COVID     Diabetes mellitus (HCC)     Dizziness     History of tobacco use 2022    Hyperlipidemia     Hypertension     Left carotid stenosis 2022    Ulcerative colitis ([facility]        Past Surgical History:        Procedure Laterality Date    COLONOSCOPY      DIAGNOSTIC CARDIAC CATH LAB PROCEDURE      SIGMOIDOSCOPY         Social History:    Social History     Tobacco Use    Smoking status: Former     Packs/day: 1.00     Types: Cigarettes     Start date: 1972     Quit date: 1982     Years since quittin.7    Smokeless tobacco: Never   Substance Use Topics    Alcohol use: Yes     Comment: occasionally                                Counseling given: Not Answered      Vital Signs (Current):   Vitals:    10/01/22 0915 10/01/22 1940 10/02/22 0745 10/02/22 0919   BP: (!) 157/93 131/88 109/67 109/67   Pulse: 77 90 80 (!) 104   Resp: 16 18 18    Temp: 36.4 °C (97.6 °F) 36 °C (96.8 °F) 36.4 °C (97.5 °F)    TempSrc: Temporal Temporal Temporal    SpO2:  98% 99%    Weight:       Height:                                                  BP Readings from Last 3 Encounters:   10/02/22 109/67   22 (!) 156/89   22 (!) 141/76       NPO Status:                                                                                 BMI:   Wt Readings from Last 3 Encounters:   22 167 lb 9.6 oz (76 kg)   10/01/22 167 lb (75.8 kg)   22 176 lb (79.8 kg)     Body mass index is 22.11 kg/m².    CBC:   Lab Results   Component Value Date/Time    WBC 6.1 09/30/2022 10:30 AM    RBC 4.28 09/30/2022 10:30 AM    HGB 12.7 09/30/2022 10:30 AM    HCT 36.4 09/30/2022 10:30 AM    MCV 85.0 09/30/2022 10:30 AM    RDW 13.3 09/30/2022 10:30 AM     09/30/2022 10:30 AM       CMP:   Lab Results   Component Value Date/Time     09/30/2022 10:30 AM    K 4.0 09/30/2022 10:30 AM     09/30/2022 10:30 AM    CO2 27 09/30/2022 10:30 AM    BUN 12 09/30/2022 10:30 AM    CREATININE 0.9 09/30/2022 10:30 AM    GFRAA >60 09/30/2022 10:30 AM    LABGLOM >60 09/30/2022 10:30 AM    GLUCOSE 282 09/30/2022 10:35 AM    GLUCOSE 119 12/30/2010 04:50 AM    PROT 6.8 09/30/2022 10:30 AM    CALCIUM 9.3 09/30/2022 10:30 AM    BILITOT 0.6 09/30/2022 10:30 AM    ALKPHOS 90 09/30/2022 10:30 AM    AST 17 09/30/2022 10:30 AM    ALT <5 09/30/2022 10:30 AM       POC Tests: No results for input(s): POCGLU, POCNA, POCK, POCCL, POCBUN, POCHEMO, POCHCT in the last 72 hours.     Coags:   Lab Results   Component Value Date/Time    PROTIME 13.4 09/30/2022 10:30 AM    INR 1.2 09/30/2022 10:30 AM    APTT 32.0 09/30/2022 10:30 AM       HCG (If Applicable): No results found for: PREGTESTUR, PREGSERUM, HCG, HCGQUANT     ABGs: No results found for: PHART, PO2ART, ADU1BMA, AMN8PCY, BEART, T6WDVVDR     Type & Screen (If Applicable):  No results found for: LABABO, LABRH    Drug/Infectious Status (If Applicable):  No results found for: HIV, HEPCAB    COVID-19 Screening (If Applicable):   Lab Results   Component Value Date/Time    COVID19 DETECTED 02/06/2021 07:00 PM    COVID19 Detected 02/05/2021 01:11 PM           Anesthesia Evaluation  Patient summary reviewed and Nursing notes reviewed no history of anesthetic complications:   Airway: Mallampati: II  TM distance: >3 FB   Neck ROM: full  Mouth opening: > = 3 FB   Dental:      Comment: Edent    Pulmonary: breath sounds clear to auscultation      (-) not a current smoker Cardiovascular:    (+) hypertension:, CAD:, dysrhythmias: atrial fibrillation, hyperlipidemia      ECG reviewed  Rhythm: irregular  Rate: normal  Echocardiogram reviewed    Cleared by cardiology     Beta Blocker:  Dose within 24 Hrs      ROS comment: EF 55%     Neuro/Psych:   (+) TIA,    CVA: right upper extremity weakness. ROS comment: Right sided weakness resolved. LICA 35%  RAINER 89% GI/Hepatic/Renal:   (+) PUD,          ROS comment: Ulcerative colitis. Endo/Other:    (+) DiabetesType II DM, well controlled, using insulin, blood dyscrasia: thrombocytopenia and anemia:., .                  ROS comment: Adrenal adenoma   Abdominal:             Vascular: negative vascular ROS. Other Findings:      EKG 9/30/22  Atrial fibrillation  Nonspecific ST and T wave abnormality  Abnormal ECG  When compared with ECG of 25-APR-2022 18:15,  Nonspecific T wave abnormality now evident in Anterior leads    ECHO 8/18/20  Findings      Left Ventricle      Normal left ventricle size. Mild concentric left ventricular hypertrophy. No gross regional wall motion abnormality. Indeterminate diastolic function. Ejection fraction is visually estimated at 55%. Right Ventricle   Normal right ventricle structure and function. Left Atrium   The left atrium is mildly dilated. Interatrial septum not well visualized. Right Atrium   Normal right atrium. Mitral Valve   Focal calcification mitral valve leaflets. Mild mitral regurgitation is present. Tricuspid Valve   Normal tricuspid valve structure and function. RVSP is 22 mmHg. Aortic Valve   Trileaflet aortic valve. Normal leaflet mobility. No aortic stenosis. No aortic regurgitation. Pulmonic Valve   Normal pulmonic valve structure and function. Pericardial Effusion   No evidence for hemodynamically significant pericardial effusion. Aorta   Normal aortic root and ascending aorta.    Miscellaneous   The inferior vena cava diameter is normal with normal respiratory   variation. Conclusions      Summary   Normal left ventricle size. Mild concentric left ventricular hypertrophy. Ejection fraction is visually estimated at 55%. Mild mitral regurgitation is present. EXAMINATION:   CTA OF THE NECK; CTA OF THE HEAD WITH CONTRAST; CTA OF THE HEAD WITH CONTRAST   WITH PERFUSION 9/30/2022 10:57 am:       TECHNIQUE:   CTA of the neck was performed with the administration of intravenous   contrast. Multiplanar reformatted images are provided for review. MIP images   are provided for review. Stenosis of the internal carotid arteries measured   using NASCET criteria. Automated exposure control, iterative reconstruction,   and/or weight based adjustment of the mA/kV was utilized to reduce the   radiation dose to as low as reasonably achievable.; CTA of the head/brain was   performed with the administration of intravenous contrast. Multiplanar   reformatted images are provided for review. MIP images are provided for   review. Automated exposure control, iterative reconstruction, and/or weight   based adjustment of the mA/kV was utilized to reduce the radiation dose to as   low as reasonably achievable. This scan was analyzed using Viz. ai contact LVO. Identification of suspected   findings is not for diagnostic use beyond notification. Viz LVO is limited to   analysis of imaging data and should not be used in-lieu of full patient   evaluation or relied upon to make or confirm diagnosis. COMPARISON:   None. HISTORY:   ORDERING SYSTEM PROVIDED HISTORY: weakness   TECHNOLOGIST PROVIDED HISTORY:   Reason for exam:->weakness   Has a \"code stroke\" or \"stroke alert\" been called? ->Yes   Decision Support Exception - unselect if not a suspected or confirmed   emergency medical condition->Emergency Medical Condition ([place]); ORDERING   SYSTEM PROVIDED HISTORY: weakness to right   TECHNOLOGIST PROVIDED HISTORY: Reason for exam:->weakness to right   Has a \"code stroke\" or \"stroke alert\" been called? ->Yes   Decision Support Exception - unselect if not a suspected or confirmed   emergency medical condition->Emergency Medical Condition ([place])       FINDINGS:       CTA NECK:       AORTIC ARCH/ARCH VESSELS: No dissection or arterial injury. No significant   stenosis of the brachiocephalic or subclavian arteries. CAROTID ARTERIES: Approximately 90% diameter stenosis is identified within   the proximal left ICA due to atherosclerosis. Approximately 50% diameter   stenosis is identified within the proximal right ICA due to atherosclerosis. VERTEBRAL ARTERIES: No dissection, arterial injury, or significant stenosis. SOFT TISSUES: The lung apices are clear. No cervical or superior mediastinal   lymphadenopathy. The larynx and pharynx are unremarkable. No acute   abnormality of the salivary and thyroid glands. BONES: No acute osseous abnormality. CTA HEAD:       ANTERIOR CIRCULATION: No significant stenosis of the intracranial internal   carotid, anterior cerebral, or middle cerebral arteries. No aneurysm. POSTERIOR CIRCULATION: There is severe stenosis of the V4 segment of the   right vertebral artery due to atherosclerosis. Moderate stenosis identified   within the left vertebral artery intracranially. OTHER: No dural venous sinus thrombosis on this non-dedicated study. BRAIN: No mass effect or midline shift. No extra-axial fluid collection. The   gray-white differentiation is maintained. CT PERFUSION:       EXAM QUALITY: The examination is diagnostic with appropriate arterial inflow   and venous outflow curves, and diagnostic perfusion maps. CORE INFARCT: The total area of ischemic core is 0 mL (CBF<30% volume). TOTAL HYPOPERFUSION: The total area of hypoperfusion is 0 mL (Tmax>6s volume).        PENUMBRA: The penumbra (mismatch) volume is 0 mL and is located in the N/A. The mismatch ratio is N/A. Impression   1. No perfusion mismatch. 2. No intracranial large vessel occlusion or aneurysm. 3. Severe stenosis of the intracranial V4 segment of the right vertebral   artery. Moderate stenosis of the intracranial left vertebral artery. 4. Severe high-grade stenosis measuring at least 90% within the proximal left   cervical internal carotid artery at the origin. 5. Approximately 50% diameter stenosis involving the right proximal cervical   ICA secondary to atherosclerosis. EXAMINATION:   CTA OF THE NECK; CTA OF THE HEAD WITH CONTRAST; CTA OF THE HEAD WITH CONTRAST   WITH PERFUSION 9/30/2022 10:57 am:       TECHNIQUE:   CTA of the neck was performed with the administration of intravenous   contrast. Multiplanar reformatted images are provided for review. MIP images   are provided for review. Stenosis of the internal carotid arteries measured   using NASCET criteria. Automated exposure control, iterative reconstruction,   and/or weight based adjustment of the mA/kV was utilized to reduce the   radiation dose to as low as reasonably achievable.; CTA of the head/brain was   performed with the administration of intravenous contrast. Multiplanar   reformatted images are provided for review. MIP images are provided for   review. Automated exposure control, iterative reconstruction, and/or weight   based adjustment of the mA/kV was utilized to reduce the radiation dose to as   low as reasonably achievable. This scan was analyzed using Viz. ai contact LVO. Identification of suspected   findings is not for diagnostic use beyond notification. Viz LVO is limited to   analysis of imaging data and should not be used in-lieu of full patient   evaluation or relied upon to make or confirm diagnosis. COMPARISON:   None.        HISTORY:   ORDERING SYSTEM PROVIDED HISTORY: weakness   TECHNOLOGIST PROVIDED HISTORY:   Reason for exam:->weakness   Has a \"code stroke\" or \"stroke alert\" been called? ->Yes   Decision Support Exception - unselect if not a suspected or confirmed   emergency medical condition->Emergency Medical Condition ([place]); ORDERING   SYSTEM PROVIDED HISTORY: weakness to right   TECHNOLOGIST PROVIDED HISTORY:   Reason for exam:->weakness to right   Has a \"code stroke\" or \"stroke alert\" been called? ->Yes   Decision Support Exception - unselect if not a suspected or confirmed   emergency medical condition->Emergency Medical Condition ([place])       FINDINGS:       CTA NECK:       AORTIC ARCH/ARCH VESSELS: No dissection or arterial injury. No significant   stenosis of the brachiocephalic or subclavian arteries. CAROTID ARTERIES: Approximately 90% diameter stenosis is identified within   the proximal left ICA due to atherosclerosis. Approximately 50% diameter   stenosis is identified within the proximal right ICA due to atherosclerosis. VERTEBRAL ARTERIES: No dissection, arterial injury, or significant stenosis. SOFT TISSUES: The lung apices are clear. No cervical or superior mediastinal   lymphadenopathy. The larynx and pharynx are unremarkable. No acute   abnormality of the salivary and thyroid glands. BONES: No acute osseous abnormality. CTA HEAD:       ANTERIOR CIRCULATION: No significant stenosis of the intracranial internal   carotid, anterior cerebral, or middle cerebral arteries. No aneurysm. POSTERIOR CIRCULATION: There is severe stenosis of the V4 segment of the   right vertebral artery due to atherosclerosis. Moderate stenosis identified   within the left vertebral artery intracranially. OTHER: No dural venous sinus thrombosis on this non-dedicated study. BRAIN: No mass effect or midline shift. No extra-axial fluid collection. The   gray-white differentiation is maintained.            CT PERFUSION:       EXAM QUALITY: The examination is diagnostic with appropriate arterial inflow   and venous outflow curves, and diagnostic perfusion maps. CORE INFARCT: The total area of ischemic core is 0 mL (CBF<30% volume). TOTAL HYPOPERFUSION: The total area of hypoperfusion is 0 mL (Tmax>6s volume). PENUMBRA: The penumbra (mismatch) volume is 0 mL and is located in the N/A. The mismatch ratio is N/A. Impression   1. No perfusion mismatch. 2. No intracranial large vessel occlusion or aneurysm. 3. Severe stenosis of the intracranial V4 segment of the right vertebral   artery. Moderate stenosis of the intracranial left vertebral artery. 4. Severe high-grade stenosis measuring at least 90% within the proximal left   cervical internal carotid artery at the origin. 5. Approximately 50% diameter stenosis involving the right proximal cervical   ICA secondary to atherosclerosis. Order History    Open Order Details     EXAMINATION:   MRI OF THE BRAIN WITHOUT CONTRAST  10/2/2022 11:26 am       TECHNIQUE:   Multiplanar multisequence MRI of the brain was performed without the   administration of intravenous contrast.       COMPARISON:   09/30/2022. HISTORY:   ORDERING SYSTEM PROVIDED HISTORY: tia vs small stroke with rapid resolution   TECHNOLOGIST PROVIDED HISTORY:   Reason for exam:->tia vs small stroke with rapid resolution   What reading provider will be dictating this exam?->CRC       FINDINGS:   INTRACRANIAL STRUCTURES/VENTRICLES: There are multifocal areas of restricted   diffusion in the left cerebral hemisphere, primarily in a watershed   distribution with associated areas of increased T2 signal, compatible with   acute/early subacute infarcts. No other areas of restricted diffusion. The   cerebral and cerebellar parenchyma demonstrate volume loss. There are   scattered areas of increased T2 signal noted supratentorially that are   compatible with mild chronic microvascular white matter ischemic disease.        There are no abnormal extra-axial fluid collections. The ventricles are   proportional to the cerebral sulci, likely related to involutional change. Normal major intracranial flow voids are noted. No areas of susceptibility artifact to suggest hemorrhage. There are areas   of susceptibility artifact related to the vertebral artery calcifications. ORBITS: There is asymmetric right globe proptosis. No retro-orbital mass. Question of there is underlying thyroid disease. SINUSES: There is scattered mild paranasal sinus disease. There is a large   left elizabeth bullosa. There is a T2 hyperintense mass along the anterior   right nasal cavity measuring 2.5 x 0.9 cm that may represent a polyp. Direct   visualization is recommended. There is a left mastoid effusion. The right mastoid air cells are clear. BONES/SOFT TISSUES: The bone marrow signal intensity and craniocervical   junction are unremarkable. Pituitary gland is normal in appearance. Impression   1. Multifocal acute/early subacute infarcts are noted in the left cerebral   hemisphere, in a watershed distribution. 2. Cerebral parenchymal volume loss with mild chronic microvascular white   matter ischemic disease. 3. Mass along the anterior right nasal cavity measuring 2.5 x 0.9 cm which   may represent a polyp. Direct visualization is recommended. Anesthesia Plan      general     ASA 4       Induction: intravenous. arterial line and BIS  MIPS: Postoperative opioids intended and Prophylactic antiemetics administered. Anesthetic plan and risks discussed with patient. Use of blood products discussed with patient whom consented to blood products. Plan discussed with CRNA and attending. Disha Martinez RN   10/2/2022      Patient seen and examined, chart reviewed, agree with above findings. Anesthetic plan, risks, benefits, alternatives, and personnel involved discussed with patient.   Patient verbalized an understanding and agreed to proceed. NPO status confirmed. Anesthetic plan discussed with care team members and agreed upon.     Vinnie Philippe DO   10/3/2022  11:11 AM

## 2022-10-02 NOTE — PROGRESS NOTES
[facility] Medicine    Subjective:  pt alert conversive wife at bedside      Current Facility-Administered Medications:     glucose chewable tablet 16 g, 4 tablet, Oral, PRN, Algkaitlin Alan Malmer, DO    dextrose bolus 10% 125 mL, 125 mL, IntraVENous, PRN **OR** dextrose bolus 10% 250 mL, 250 mL, IntraVENous, PRN, Algkaitlin ChaviraAlan Malmer, DO    glucagon (rDNA) injection 1 mg, 1 mg, SubCUTAneous, PRN, Algkaitlin ChaviraAlan Malmer, DO    dextrose 10 % infusion, , IntraVENous, Continuous PRN, Algkaitlin ChaviraAlan Malmer, DO    mesalamine (CANASA) suppository 1,000 mg  (Patient Supplied), 1,000 mg, Rectal, Nightly, Algie Alan Malmer, DO, 1,000 mg at 10/01/22 2017    doxazosin (CARDURA) tablet 2 mg, 2 mg, Oral, QAM, Hector Phillips Malmer, DO, 2 mg at 10/02/22 4420    perflutren lipid microspheres (DEFINITY) injection 1.65 mg, 1.5 mL, IntraVENous, ONCE PRN, ANDREW Nam    aspirin EC tablet 81 mg, 81 mg, Oral, Daily, Algkaitlin NettlesAlan Malmer, DO, 81 mg at 10/02/22 0242    balsalazide (COLAZAL) capsule 2,250 mg  (Patient Supplied), 2,250 mg, Oral, TID, Algie Alan Malmer, DO, 2,250 mg at 10/02/22 0920    hydrocortisone (ANUSOL-HC) suppository 25 mg, 25 mg, Rectal, BID, Algkaitlin NettlesAlan Malmer, DO, 25 mg at 09/30/22 2332    insulin glargine-yfgn (SEMGLEE-YFGN) injection vial 26 Units, 26 Units, SubCUTAneous, QAM, Hector Chavirattcher Malmer, DO, 26 Units at 10/01/22 0943    metoprolol succinate (TOPROL XL) extended release tablet 50 mg, 50 mg, Oral, QAM, Algkaitlin Alan Malmer, DO, 50 mg at 10/02/22 0920    pravastatin (PRAVACHOL) tablet 20 mg, 20 mg, Oral, Nightly, Algkaitlin Alan Malmer, DO, 20 mg at 10/01/22 2018    sodium chloride flush 0.9 % injection 5-40 mL, 5-40 mL, IntraVENous, 2 times per day, Chhaya Sebastian DO, 10 mL at 10/02/22 5905    sodium chloride flush 0.9 % injection 5-40 mL, 5-40 mL, IntraVENous, PRN, Hector Breen DO    0.9 % sodium chloride infusion, , IntraVENous, PRN, Hector Breen DO    ondansetron (ZOFRAN-ODT) disintegrating tablet 4 mg, 4 mg, Oral, Q8H PRN **OR** ondansetron (ZOFRAN) injection 4 mg, 4 mg, IntraVENous, Q6H PRN, Dennys Foley Malmer, DO    polyethylene glycol (GLYCOLAX) packet 17 g, 17 g, Oral, Daily PRN, Dennys Foley Malmer, DO    acetaminophen (TYLENOL) tablet 650 mg, 650 mg, Oral, Q6H PRN **OR** acetaminophen (TYLENOL) suppository 650 mg, 650 mg, Rectal, Q6H PRN, Dennys Foley Malmer, DO    insulin lispro (HUMALOG) injection vial 0-8 Units, 0-8 Units, SubCUTAneous, TID WC, Dennys Barkermer, DO, 2 Units at 10/01/22 1755    insulin lispro (HUMALOG) injection vial 0-4 Units, 0-4 Units, SubCUTAneous, Nightly, Dennys Foley Malmer, DO    enoxaparin (LOVENOX) injection 80 mg, 1 mg/kg, SubCUTAneous, BID, Marni Miller MD, 80 mg at 10/02/22 2789    amLODIPine (NORVASC) tablet 10 mg, 10 mg, Oral, Daily, 10 mg at 10/02/22 0919 **AND** lisinopril (PRINIVIL;ZESTRIL) tablet 20 mg, 20 mg, Oral, Daily, Charleyzachery Barkermer, DO, 20 mg at 10/02/22 0919    doxazosin (CARDURA) tablet 4 mg, 4 mg, Oral, Nightly, Jayroalicia Barkermer, DO, 4 mg at 10/01/22 2223    Objective:    /67   Pulse (!) 104   Temp 97.5 °F (36.4 °C) (Temporal)   Resp 18   Ht 6' 1\" (1.854 m)   Wt 167 lb 9.6 oz (76 kg)   SpO2 99%   BMI 22.11 kg/m²     Heart:  irreg  Lungs:  ctab  Abd: + bs soft nontender  Extrem:  w/o edema    CBC with Differential:    Lab Results   Component Value Date/Time    WBC 6.1 09/30/2022 10:30 AM    RBC 4.28 09/30/2022 10:30 AM    HGB 12.7 09/30/2022 10:30 AM    HCT 36.4 09/30/2022 10:30 AM     09/30/2022 10:30 AM    MCV 85.0 09/30/2022 10:30 AM    MCH 29.7 09/30/2022 10:30 AM    MCHC 34.9 09/30/2022 10:30 AM    RDW 13.3 09/30/2022 10:30 AM    NRBC 0.0 02/07/2021 06:00 AM    LYMPHOPCT 11.6 09/30/2022 10:30 AM    MONOPCT 5.9 09/30/2022 10:30 AM    BASOPCT 0.5 09/30/2022 10:30 AM    MONOSABS 0.36 09/30/2022 10:30 AM    LYMPHSABS 0.71 09/30/2022 10:30 AM    EOSABS 0.11 09/30/2022 10:30 AM    BASOSABS 0.03 09/30/2022 10:30 AM     CMP:    Lab Results   Component Value Date/Time  09/30/2022 10:30 AM    K 4.0 09/30/2022 10:30 AM     09/30/2022 10:30 AM    CO2 27 09/30/2022 10:30 AM    BUN 12 09/30/2022 10:30 AM    CREATININE 0.9 09/30/2022 10:30 AM    GFRAA >60 09/30/2022 10:30 AM    LABGLOM >60 09/30/2022 10:30 AM    GLUCOSE 282 09/30/2022 10:35 AM    GLUCOSE 119 12/30/2010 04:50 AM    PROT 6.8 09/30/2022 10:30 AM    LABALBU 4.5 09/30/2022 10:30 AM    CALCIUM 9.3 09/30/2022 10:30 AM    BILITOT 0.6 09/30/2022 10:30 AM    ALKPHOS 90 09/30/2022 10:30 AM    AST 17 09/30/2022 10:30 AM    ALT <5 09/30/2022 10:30 AM     Warfarin PT/INR:    Lab Results   Component Value Date    INR 1.2 09/30/2022    INR 1.4 02/11/2021    INR 1.0 07/08/2020    PROTIME 13.4 (H) 09/30/2022    PROTIME 16.3 (H) 02/11/2021    PROTIME 12.0 07/08/2020       Assessment:    Principal Problem:    RUE weakness  Active Problems:    TIA (transient ischemic attack)  Resolved Problems:    * No resolved hospital problems.  *      Plan:  For OR tomorrow        Joseluis Crain DO  10:22 AM  10/2/2022

## 2022-10-02 NOTE — PLAN OF CARE
Nothing new overnight. MRI brain and echo remain pending. Planned for CEA tomorrow with vascular. Will f/u remainder of testing once complete.  Please call with questions or concerns in the interim     Monica Beasley PA-C

## 2022-10-03 ENCOUNTER — ANESTHESIA (OUTPATIENT)
Dept: OPERATING ROOM | Age: 67
DRG: 038 | End: 2022-10-03
Payer: MEDICARE

## 2022-10-03 LAB
ABO/RH: NORMAL
ANION GAP SERPL CALCULATED.3IONS-SCNC: 11 MMOL/L (ref 7–16)
ANTIBODY SCREEN: NORMAL
BUN BLDV-MCNC: 17 MG/DL (ref 6–23)
CALCIUM SERPL-MCNC: 8.8 MG/DL (ref 8.6–10.2)
CHLORIDE BLD-SCNC: 107 MMOL/L (ref 98–107)
CO2: 26 MMOL/L (ref 22–29)
CREAT SERPL-MCNC: 0.9 MG/DL (ref 0.7–1.2)
GFR AFRICAN AMERICAN: >60
GFR NON-AFRICAN AMERICAN: >60 ML/MIN/1.73
GLUCOSE BLD-MCNC: 117 MG/DL (ref 74–99)
HCT VFR BLD CALC: 36.1 % (ref 37–54)
HEMOGLOBIN: 12.4 G/DL (ref 12.5–16.5)
INR BLD: 1.3
MCH RBC QN AUTO: 29.6 PG (ref 26–35)
MCHC RBC AUTO-ENTMCNC: 34.3 % (ref 32–34.5)
MCV RBC AUTO: 86.2 FL (ref 80–99.9)
METER GLUCOSE: 125 MG/DL (ref 74–99)
METER GLUCOSE: 192 MG/DL (ref 74–99)
METER GLUCOSE: 234 MG/DL (ref 74–99)
PDW BLD-RTO: 13.3 FL (ref 11.5–15)
PLATELET # BLD: 123 E9/L (ref 130–450)
PMV BLD AUTO: 10.1 FL (ref 7–12)
POC ACT LR: 158 SECONDS
POC ACT LR: 162 SECONDS
POC ACT LR: 267 SECONDS
POC ACT LR: 364 SECONDS
POTASSIUM SERPL-SCNC: 3.4 MMOL/L (ref 3.5–5)
PROTHROMBIN TIME: 13.6 SEC (ref 9.3–12.4)
RBC # BLD: 4.19 E12/L (ref 3.8–5.8)
SODIUM BLD-SCNC: 144 MMOL/L (ref 132–146)
WBC # BLD: 5.7 E9/L (ref 4.5–11.5)

## 2022-10-03 PROCEDURE — 6370000000 HC RX 637 (ALT 250 FOR IP): Performed by: SURGERY

## 2022-10-03 PROCEDURE — 88304 TISSUE EXAM BY PATHOLOGIST: CPT

## 2022-10-03 PROCEDURE — 3700000001 HC ADD 15 MINUTES (ANESTHESIA): Performed by: SURGERY

## 2022-10-03 PROCEDURE — 03CJ0ZZ EXTIRPATION OF MATTER FROM LEFT COMMON CAROTID ARTERY, OPEN APPROACH: ICD-10-PCS | Performed by: SURGERY

## 2022-10-03 PROCEDURE — 86850 RBC ANTIBODY SCREEN: CPT

## 2022-10-03 PROCEDURE — 7100000000 HC PACU RECOVERY - FIRST 15 MIN

## 2022-10-03 PROCEDURE — 6360000002 HC RX W HCPCS: Performed by: ANESTHESIOLOGIST ASSISTANT

## 2022-10-03 PROCEDURE — 2000000000 HC ICU R&B

## 2022-10-03 PROCEDURE — 2580000003 HC RX 258: Performed by: ANESTHESIOLOGIST ASSISTANT

## 2022-10-03 PROCEDURE — 2580000003 HC RX 258: Performed by: SURGERY

## 2022-10-03 PROCEDURE — 2580000003 HC RX 258: Performed by: STUDENT IN AN ORGANIZED HEALTH CARE EDUCATION/TRAINING PROGRAM

## 2022-10-03 PROCEDURE — 2500000003 HC RX 250 WO HCPCS: Performed by: SURGERY

## 2022-10-03 PROCEDURE — 6370000000 HC RX 637 (ALT 250 FOR IP): Performed by: INTERNAL MEDICINE

## 2022-10-03 PROCEDURE — 86901 BLOOD TYPING SEROLOGIC RH(D): CPT

## 2022-10-03 PROCEDURE — 88311 DECALCIFY TISSUE: CPT

## 2022-10-03 PROCEDURE — 80048 BASIC METABOLIC PNL TOTAL CA: CPT

## 2022-10-03 PROCEDURE — 6360000002 HC RX W HCPCS: Performed by: SURGERY

## 2022-10-03 PROCEDURE — A4217 STERILE WATER/SALINE, 500 ML: HCPCS | Performed by: SURGERY

## 2022-10-03 PROCEDURE — 85347 COAGULATION TIME ACTIVATED: CPT

## 2022-10-03 PROCEDURE — 2580000003 HC RX 258: Performed by: NURSE PRACTITIONER

## 2022-10-03 PROCEDURE — 85027 COMPLETE CBC AUTOMATED: CPT

## 2022-10-03 PROCEDURE — 03CL0ZZ EXTIRPATION OF MATTER FROM LEFT INTERNAL CAROTID ARTERY, OPEN APPROACH: ICD-10-PCS | Performed by: SURGERY

## 2022-10-03 PROCEDURE — 3600000005 HC SURGERY LEVEL 5 BASE: Performed by: SURGERY

## 2022-10-03 PROCEDURE — 6360000002 HC RX W HCPCS: Performed by: INTERNAL MEDICINE

## 2022-10-03 PROCEDURE — 2580000003 HC RX 258: Performed by: INTERNAL MEDICINE

## 2022-10-03 PROCEDURE — 2500000003 HC RX 250 WO HCPCS: Performed by: NURSE PRACTITIONER

## 2022-10-03 PROCEDURE — 86900 BLOOD TYPING SEROLOGIC ABO: CPT

## 2022-10-03 PROCEDURE — 35301 RECHANNELING OF ARTERY: CPT | Performed by: SURGERY

## 2022-10-03 PROCEDURE — 7100000001 HC PACU RECOVERY - ADDTL 15 MIN

## 2022-10-03 PROCEDURE — 85610 PROTHROMBIN TIME: CPT

## 2022-10-03 PROCEDURE — 6360000002 HC RX W HCPCS: Performed by: NURSE PRACTITIONER

## 2022-10-03 PROCEDURE — 2500000003 HC RX 250 WO HCPCS: Performed by: ANESTHESIOLOGIST ASSISTANT

## 2022-10-03 PROCEDURE — 36415 COLL VENOUS BLD VENIPUNCTURE: CPT

## 2022-10-03 PROCEDURE — 6360000002 HC RX W HCPCS: Performed by: STUDENT IN AN ORGANIZED HEALTH CARE EDUCATION/TRAINING PROGRAM

## 2022-10-03 PROCEDURE — 82962 GLUCOSE BLOOD TEST: CPT

## 2022-10-03 PROCEDURE — 2709999900 HC NON-CHARGEABLE SUPPLY: Performed by: SURGERY

## 2022-10-03 PROCEDURE — 99232 SBSQ HOSP IP/OBS MODERATE 35: CPT | Performed by: PHYSICIAN ASSISTANT

## 2022-10-03 PROCEDURE — 3700000000 HC ANESTHESIA ATTENDED CARE: Performed by: SURGERY

## 2022-10-03 PROCEDURE — 3600000015 HC SURGERY LEVEL 5 ADDTL 15MIN: Performed by: SURGERY

## 2022-10-03 PROCEDURE — 6360000002 HC RX W HCPCS: Performed by: NURSE ANESTHETIST, CERTIFIED REGISTERED

## 2022-10-03 RX ORDER — MORPHINE SULFATE 2 MG/ML
2 INJECTION, SOLUTION INTRAMUSCULAR; INTRAVENOUS
Status: DISCONTINUED | OUTPATIENT
Start: 2022-10-03 | End: 2022-10-04

## 2022-10-03 RX ORDER — NITROGLYCERIN 5 MG/ML
INJECTION, SOLUTION INTRAVENOUS PRN
Status: DISCONTINUED | OUTPATIENT
Start: 2022-10-03 | End: 2022-10-03 | Stop reason: SDUPTHER

## 2022-10-03 RX ORDER — BUPIVACAINE HYDROCHLORIDE 2.5 MG/ML
INJECTION, SOLUTION EPIDURAL; INFILTRATION; INTRACAUDAL PRN
Status: DISCONTINUED | OUTPATIENT
Start: 2022-10-03 | End: 2022-10-03 | Stop reason: ALTCHOICE

## 2022-10-03 RX ORDER — ESMOLOL HYDROCHLORIDE 10 MG/ML
INJECTION INTRAVENOUS PRN
Status: DISCONTINUED | OUTPATIENT
Start: 2022-10-03 | End: 2022-10-03 | Stop reason: SDUPTHER

## 2022-10-03 RX ORDER — LABETALOL HYDROCHLORIDE 5 MG/ML
INJECTION, SOLUTION INTRAVENOUS PRN
Status: DISCONTINUED | OUTPATIENT
Start: 2022-10-03 | End: 2022-10-03 | Stop reason: SDUPTHER

## 2022-10-03 RX ORDER — LABETALOL HYDROCHLORIDE 5 MG/ML
10 INJECTION, SOLUTION INTRAVENOUS ONCE
Status: COMPLETED | OUTPATIENT
Start: 2022-10-03 | End: 2022-10-03

## 2022-10-03 RX ORDER — EPHEDRINE SULFATE/0.9% NACL/PF 50 MG/5 ML
SYRINGE (ML) INTRAVENOUS PRN
Status: DISCONTINUED | OUTPATIENT
Start: 2022-10-03 | End: 2022-10-03 | Stop reason: SDUPTHER

## 2022-10-03 RX ORDER — PHENYLEPHRINE HYDROCHLORIDE 10 MG/ML
INJECTION INTRAVENOUS PRN
Status: DISCONTINUED | OUTPATIENT
Start: 2022-10-03 | End: 2022-10-03 | Stop reason: SDUPTHER

## 2022-10-03 RX ORDER — POTASSIUM CHLORIDE 7.45 MG/ML
10 INJECTION INTRAVENOUS
Status: COMPLETED | OUTPATIENT
Start: 2022-10-03 | End: 2022-10-03

## 2022-10-03 RX ORDER — PROTAMINE SULFATE 10 MG/ML
INJECTION, SOLUTION INTRAVENOUS PRN
Status: DISCONTINUED | OUTPATIENT
Start: 2022-10-03 | End: 2022-10-03 | Stop reason: SDUPTHER

## 2022-10-03 RX ORDER — LIDOCAINE HYDROCHLORIDE 10 MG/ML
INJECTION, SOLUTION EPIDURAL; INFILTRATION; INTRACAUDAL; PERINEURAL
Status: DISPENSED
Start: 2022-10-03 | End: 2022-10-03

## 2022-10-03 RX ORDER — LABETALOL HYDROCHLORIDE 5 MG/ML
10 INJECTION, SOLUTION INTRAVENOUS EVERY 4 HOURS PRN
Status: DISCONTINUED | OUTPATIENT
Start: 2022-10-03 | End: 2022-10-05 | Stop reason: HOSPADM

## 2022-10-03 RX ORDER — FENTANYL CITRATE 50 UG/ML
INJECTION, SOLUTION INTRAMUSCULAR; INTRAVENOUS PRN
Status: DISCONTINUED | OUTPATIENT
Start: 2022-10-03 | End: 2022-10-03 | Stop reason: SDUPTHER

## 2022-10-03 RX ORDER — LIDOCAINE HYDROCHLORIDE 20 MG/ML
INJECTION, SOLUTION INTRAVENOUS PRN
Status: DISCONTINUED | OUTPATIENT
Start: 2022-10-03 | End: 2022-10-03 | Stop reason: SDUPTHER

## 2022-10-03 RX ORDER — PROPOFOL 10 MG/ML
INJECTION, EMULSION INTRAVENOUS PRN
Status: DISCONTINUED | OUTPATIENT
Start: 2022-10-03 | End: 2022-10-03 | Stop reason: SDUPTHER

## 2022-10-03 RX ORDER — DEXAMETHASONE SODIUM PHOSPHATE 10 MG/ML
INJECTION INTRAMUSCULAR; INTRAVENOUS PRN
Status: DISCONTINUED | OUTPATIENT
Start: 2022-10-03 | End: 2022-10-03 | Stop reason: SDUPTHER

## 2022-10-03 RX ORDER — SODIUM CHLORIDE 9 MG/ML
INJECTION, SOLUTION INTRAVENOUS CONTINUOUS PRN
Status: DISCONTINUED | OUTPATIENT
Start: 2022-10-03 | End: 2022-10-03 | Stop reason: SDUPTHER

## 2022-10-03 RX ORDER — HEPARIN SODIUM 1000 [USP'U]/ML
INJECTION, SOLUTION INTRAVENOUS; SUBCUTANEOUS PRN
Status: DISCONTINUED | OUTPATIENT
Start: 2022-10-03 | End: 2022-10-03 | Stop reason: SDUPTHER

## 2022-10-03 RX ORDER — MORPHINE SULFATE 4 MG/ML
4 INJECTION, SOLUTION INTRAMUSCULAR; INTRAVENOUS
Status: DISCONTINUED | OUTPATIENT
Start: 2022-10-03 | End: 2022-10-04

## 2022-10-03 RX ORDER — ROCURONIUM BROMIDE 10 MG/ML
INJECTION, SOLUTION INTRAVENOUS PRN
Status: DISCONTINUED | OUTPATIENT
Start: 2022-10-03 | End: 2022-10-03 | Stop reason: SDUPTHER

## 2022-10-03 RX ORDER — SODIUM CHLORIDE 9 MG/ML
INJECTION, SOLUTION INTRAVENOUS CONTINUOUS
Status: DISCONTINUED | OUTPATIENT
Start: 2022-10-03 | End: 2022-10-04

## 2022-10-03 RX ADMIN — ESMOLOL HYDROCHLORIDE 10 MG: 10 INJECTION, SOLUTION INTRAVENOUS at 13:41

## 2022-10-03 RX ADMIN — BALSALAZIDE DISODIUM 2250 MG: 750 CAPSULE ORAL at 08:46

## 2022-10-03 RX ADMIN — Medication 5 MG: at 12:22

## 2022-10-03 RX ADMIN — POTASSIUM CHLORIDE 10 MEQ: 7.46 INJECTION, SOLUTION INTRAVENOUS at 16:41

## 2022-10-03 RX ADMIN — SODIUM CHLORIDE: 9 INJECTION, SOLUTION INTRAVENOUS at 12:15

## 2022-10-03 RX ADMIN — MORPHINE SULFATE 2 MG: 2 INJECTION, SOLUTION INTRAMUSCULAR; INTRAVENOUS at 15:11

## 2022-10-03 RX ADMIN — ASPIRIN 81 MG: 81 TABLET, COATED ORAL at 08:41

## 2022-10-03 RX ADMIN — HEPARIN SODIUM 3000 UNITS: 1000 INJECTION INTRAVENOUS; SUBCUTANEOUS at 12:18

## 2022-10-03 RX ADMIN — FENTANYL CITRATE 100 MCG: 50 INJECTION, SOLUTION INTRAMUSCULAR; INTRAVENOUS at 11:20

## 2022-10-03 RX ADMIN — LABETALOL HYDROCHLORIDE 5 MG: 5 INJECTION INTRAVENOUS at 13:35

## 2022-10-03 RX ADMIN — LABETALOL HYDROCHLORIDE 10 MG: 5 INJECTION INTRAVENOUS at 14:14

## 2022-10-03 RX ADMIN — PROTAMINE SULFATE 30 MG: 10 INJECTION, SOLUTION INTRAVENOUS at 13:03

## 2022-10-03 RX ADMIN — CEFAZOLIN 2000 MG: 2 INJECTION, POWDER, FOR SOLUTION INTRAMUSCULAR; INTRAVENOUS at 17:46

## 2022-10-03 RX ADMIN — HEPARIN SODIUM 8000 UNITS: 1000 INJECTION INTRAVENOUS; SUBCUTANEOUS at 12:09

## 2022-10-03 RX ADMIN — ROCURONIUM BROMIDE 40 MG: 10 INJECTION, SOLUTION INTRAVENOUS at 11:20

## 2022-10-03 RX ADMIN — SODIUM CHLORIDE, PRESERVATIVE FREE 10 ML: 5 INJECTION INTRAVENOUS at 17:46

## 2022-10-03 RX ADMIN — AMLODIPINE BESYLATE 10 MG: 10 TABLET ORAL at 08:43

## 2022-10-03 RX ADMIN — FENTANYL CITRATE 50 MCG: 50 INJECTION, SOLUTION INTRAMUSCULAR; INTRAVENOUS at 12:33

## 2022-10-03 RX ADMIN — Medication 5 MG: at 12:17

## 2022-10-03 RX ADMIN — SODIUM CHLORIDE: 9 INJECTION, SOLUTION INTRAVENOUS at 11:05

## 2022-10-03 RX ADMIN — PROPOFOL 150 MG: 10 INJECTION, EMULSION INTRAVENOUS at 11:20

## 2022-10-03 RX ADMIN — LISINOPRIL 20 MG: 20 TABLET ORAL at 08:45

## 2022-10-03 RX ADMIN — LABETALOL HYDROCHLORIDE 5 MG: 5 INJECTION INTRAVENOUS at 13:38

## 2022-10-03 RX ADMIN — ROCURONIUM BROMIDE 10 MG: 10 INJECTION, SOLUTION INTRAVENOUS at 11:54

## 2022-10-03 RX ADMIN — Medication 5 MG: at 12:20

## 2022-10-03 RX ADMIN — PROPOFOL 30 MG: 10 INJECTION, EMULSION INTRAVENOUS at 12:33

## 2022-10-03 RX ADMIN — ESMOLOL HYDROCHLORIDE 50 MG: 10 INJECTION, SOLUTION INTRAVENOUS at 11:23

## 2022-10-03 RX ADMIN — ESMOLOL HYDROCHLORIDE 20 MG: 10 INJECTION, SOLUTION INTRAVENOUS at 11:53

## 2022-10-03 RX ADMIN — PRAVASTATIN SODIUM 20 MG: 20 TABLET ORAL at 21:30

## 2022-10-03 RX ADMIN — LABETALOL HYDROCHLORIDE 5 MG: 5 INJECTION INTRAVENOUS at 13:45

## 2022-10-03 RX ADMIN — DEXTROSE MONOHYDRATE 5 MG/HR: 50 INJECTION, SOLUTION INTRAVENOUS at 14:44

## 2022-10-03 RX ADMIN — ESMOLOL HYDROCHLORIDE 50 MG: 10 INJECTION, SOLUTION INTRAVENOUS at 11:24

## 2022-10-03 RX ADMIN — DOXAZOSIN 4 MG: 4 TABLET ORAL at 21:33

## 2022-10-03 RX ADMIN — DEXAMETHASONE SODIUM PHOSPHATE 10 MG: 10 INJECTION INTRAMUSCULAR; INTRAVENOUS at 11:21

## 2022-10-03 RX ADMIN — POTASSIUM CHLORIDE 10 MEQ: 7.46 INJECTION, SOLUTION INTRAVENOUS at 15:37

## 2022-10-03 RX ADMIN — NITROGLYCERIN 100 MCG: 5 INJECTION, SOLUTION INTRAVENOUS at 13:44

## 2022-10-03 RX ADMIN — CEFAZOLIN 2000 MG: 2 INJECTION, POWDER, FOR SOLUTION INTRAMUSCULAR; INTRAVENOUS at 11:41

## 2022-10-03 RX ADMIN — NITROGLYCERIN 50 MCG: 5 INJECTION, SOLUTION INTRAVENOUS at 13:41

## 2022-10-03 RX ADMIN — POTASSIUM CHLORIDE 10 MEQ: 7.46 INJECTION, SOLUTION INTRAVENOUS at 14:47

## 2022-10-03 RX ADMIN — DOXAZOSIN 2 MG: 4 TABLET ORAL at 08:42

## 2022-10-03 RX ADMIN — SODIUM CHLORIDE, PRESERVATIVE FREE 10 ML: 5 INJECTION INTRAVENOUS at 20:35

## 2022-10-03 RX ADMIN — SUGAMMADEX 500 MG: 100 INJECTION, SOLUTION INTRAVENOUS at 13:41

## 2022-10-03 RX ADMIN — LABETALOL HYDROCHLORIDE 5 MG: 5 INJECTION INTRAVENOUS at 13:47

## 2022-10-03 RX ADMIN — LIDOCAINE HYDROCHLORIDE 60 MG: 20 INJECTION, SOLUTION INTRAVENOUS at 11:20

## 2022-10-03 RX ADMIN — LABETALOL HYDROCHLORIDE 10 MG: 5 INJECTION INTRAVENOUS at 18:20

## 2022-10-03 RX ADMIN — NITROGLYCERIN 50 MCG: 5 INJECTION, SOLUTION INTRAVENOUS at 13:39

## 2022-10-03 RX ADMIN — SODIUM CHLORIDE, PRESERVATIVE FREE 10 ML: 5 INJECTION INTRAVENOUS at 08:42

## 2022-10-03 RX ADMIN — PHENYLEPHRINE HYDROCHLORIDE 100 MCG: 10 INJECTION, SOLUTION INTRAMUSCULAR; INTRAVENOUS; SUBCUTANEOUS at 12:15

## 2022-10-03 RX ADMIN — METOPROLOL SUCCINATE 50 MG: 50 TABLET, FILM COATED, EXTENDED RELEASE ORAL at 08:43

## 2022-10-03 RX ADMIN — NITROGLYCERIN 50 MCG: 5 INJECTION, SOLUTION INTRAVENOUS at 13:47

## 2022-10-03 RX ADMIN — PHENYLEPHRINE HYDROCHLORIDE 100 MCG: 10 INJECTION, SOLUTION INTRAVENOUS at 13:17

## 2022-10-03 RX ADMIN — PHENYLEPHRINE HYDROCHLORIDE 100 MCG: 10 INJECTION, SOLUTION INTRAVENOUS at 12:43

## 2022-10-03 RX ADMIN — PHENYLEPHRINE HYDROCHLORIDE 100 MCG: 10 INJECTION, SOLUTION INTRAMUSCULAR; INTRAVENOUS; SUBCUTANEOUS at 11:43

## 2022-10-03 RX ADMIN — FENTANYL CITRATE 100 MCG: 50 INJECTION, SOLUTION INTRAMUSCULAR; INTRAVENOUS at 11:51

## 2022-10-03 ASSESSMENT — PAIN DESCRIPTION - DESCRIPTORS: DESCRIPTORS: ACHING

## 2022-10-03 ASSESSMENT — PAIN SCALES - GENERAL
PAINLEVEL_OUTOF10: 4
PAINLEVEL_OUTOF10: 0

## 2022-10-03 ASSESSMENT — PAIN DESCRIPTION - LOCATION: LOCATION: HEAD

## 2022-10-03 ASSESSMENT — PAIN DESCRIPTION - PAIN TYPE: TYPE: ACUTE PAIN

## 2022-10-03 NOTE — PROGRESS NOTES
[facility] Medicine    Subjective:  pt alert conversive scheduled for OR today      Current Facility-Administered Medications:     ceFAZolin (ANCEF) 2,000 mg in sterile water 20 mL IV syringe, 2,000 mg, IntraVENous, On Call to OR, Darnell Franco MD    glucose chewable tablet 16 g, 4 tablet, Oral, PRN, Michaelmore Carli Malmer, DO    dextrose bolus 10% 125 mL, 125 mL, IntraVENous, PRN **OR** dextrose bolus 10% 250 mL, 250 mL, IntraVENous, PRN, Philmore [place] Malmer, DO    glucagon (rDNA) injection 1 mg, 1 mg, SubCUTAneous, PRN, Philmore Carli Malmer, DO    dextrose 10 % infusion, , IntraVENous, Continuous PRN, Michaelmore [place] Malmer, DO    mesalamine (CANASA) suppository 1,000 mg  (Patient Supplied), 1,000 mg, Rectal, Nightly, Philmore [place] Malmer, DO, 1,000 mg at 10/02/22 2104    doxazosin (CARDURA) tablet 2 mg, 2 mg, Oral, QAM, Philmore [place] Malmer, DO, 2 mg at 10/02/22 5568    perflutren lipid microspheres (DEFINITY) injection 1.65 mg, 1.5 mL, IntraVENous, ONCE PRN, ANDREW Rodriguez    aspirin EC tablet 81 mg, 81 mg, Oral, Daily, Philmore Carli Malmer, DO, 81 mg at 10/02/22 1835    balsalazide (COLAZAL) capsule 2,250 mg  (Patient Supplied), 2,250 mg, Oral, TID, Philmore Carli Malmer, DO, 2,250 mg at 10/02/22 2104    hydrocortisone (ANUSOL-HC) suppository 25 mg, 25 mg, Rectal, BID, Philmore [place] Malmer, DO, 25 mg at 09/30/22 2332    insulin glargine-yfgn (SEMGLEE-YFGN) injection vial 26 Units, 26 Units, SubCUTAneous, QAM, Michaelmore Carli Malmer, DO, 26 Units at 10/01/22 7879    metoprolol succinate (TOPROL XL) extended release tablet 50 mg, 50 mg, Oral, QAM, Philmore [place] Malmer, DO, 50 mg at 10/02/22 0920    pravastatin (PRAVACHOL) tablet 20 mg, 20 mg, Oral, Nightly, Mane Breen DO, 20 mg at 10/02/22 2105    sodium chloride flush 0.9 % injection 5-40 mL, 5-40 mL, IntraVENous, 2 times per day, Daniella Mason DO, 10 mL at 10/02/22 2104    sodium chloride flush 0.9 % injection 5-40 mL, 5-40 mL, IntraVENous, PRN, Mane Breen DO    0.9 % sodium chloride infusion, , IntraVENous, PRN, Renee Terrelliff Malmer, DO    ondansetron (ZOFRAN-ODT) disintegrating tablet 4 mg, 4 mg, Oral, Q8H PRN **OR** ondansetron (ZOFRAN) injection 4 mg, 4 mg, IntraVENous, Q6H PRN, Renee Terrelliff Malmer, DO    polyethylene glycol (GLYCOLAX) packet 17 g, 17 g, Oral, Daily PRN, Renee Terrelliff Malmer, DO    acetaminophen (TYLENOL) tablet 650 mg, 650 mg, Oral, Q6H PRN **OR** acetaminophen (TYLENOL) suppository 650 mg, 650 mg, Rectal, Q6H PRN, Renee Terrelliff Malmer, DO    insulin lispro (HUMALOG) injection vial 0-8 Units, 0-8 Units, SubCUTAneous, TID WC, Renee Hale Malmer, DO, 2 Units at 10/01/22 1755    insulin lispro (HUMALOG) injection vial 0-4 Units, 0-4 Units, SubCUTAneous, Nightly, Renee Terrelliff Malmer, DO    amLODIPine (NORVASC) tablet 10 mg, 10 mg, Oral, Daily, 10 mg at 10/02/22 0919 **AND** lisinopril (PRINIVIL;ZESTRIL) tablet 20 mg, 20 mg, Oral, Daily, Renee Geoffrey Malmer, DO, 20 mg at 10/02/22 0919    doxazosin (CARDURA) tablet 4 mg, 4 mg, Oral, Nightly, Renee Geoffrey Malmer, DO, 4 mg at 10/02/22 2105    Objective:    /81   Pulse 77   Temp 98.1 °F (36.7 °C)   Resp 18   Ht 6' 1\" (1.854 m)   Wt 167 lb 9.6 oz (76 kg)   SpO2 99%   BMI 22.11 kg/m²     Heart:  irreg  Lungs:  ctab  Abd: + bs soft nontender  Extrem:  w/o edema good strength    CBC with Differential:    Lab Results   Component Value Date/Time    WBC 6.1 09/30/2022 10:30 AM    RBC 4.28 09/30/2022 10:30 AM    HGB 12.7 09/30/2022 10:30 AM    HCT 36.4 09/30/2022 10:30 AM     09/30/2022 10:30 AM    MCV 85.0 09/30/2022 10:30 AM    MCH 29.7 09/30/2022 10:30 AM    MCHC 34.9 09/30/2022 10:30 AM    RDW 13.3 09/30/2022 10:30 AM    NRBC 0.0 02/07/2021 06:00 AM    LYMPHOPCT 11.6 09/30/2022 10:30 AM    MONOPCT 5.9 09/30/2022 10:30 AM    BASOPCT 0.5 09/30/2022 10:30 AM    MONOSABS 0.36 09/30/2022 10:30 AM    LYMPHSABS 0.71 09/30/2022 10:30 AM    EOSABS 0.11 09/30/2022 10:30 AM    BASOSABS 0.03 09/30/2022 10:30 AM     CMP:    Lab Results   Component Value Date/Time     09/30/2022 10:30 AM    K 4.0 09/30/2022 10:30 AM     09/30/2022 10:30 AM    CO2 27 09/30/2022 10:30 AM    BUN 12 09/30/2022 10:30 AM    CREATININE 0.9 09/30/2022 10:30 AM    GFRAA >60 09/30/2022 10:30 AM    LABGLOM >60 09/30/2022 10:30 AM    GLUCOSE 282 09/30/2022 10:35 AM    GLUCOSE 119 12/30/2010 04:50 AM    PROT 6.8 09/30/2022 10:30 AM    LABALBU 4.5 09/30/2022 10:30 AM    CALCIUM 9.3 09/30/2022 10:30 AM    BILITOT 0.6 09/30/2022 10:30 AM    ALKPHOS 90 09/30/2022 10:30 AM    AST 17 09/30/2022 10:30 AM    ALT <5 09/30/2022 10:30 AM     Warfarin PT/INR:    Lab Results   Component Value Date    INR 1.3 10/03/2022    INR 1.2 09/30/2022    INR 1.4 02/11/2021    PROTIME 13.6 (H) 10/03/2022    PROTIME 13.4 (H) 09/30/2022    PROTIME 16.3 (H) 02/11/2021       Assessment:    Principal Problem:    RUE weakness  Active Problems:    TIA (transient ischemic attack)  Resolved Problems:    * No resolved hospital problems.  *      Plan:  OR today        Jimbo Landon DO  8:29 AM  10/3/2022

## 2022-10-03 NOTE — PROGRESS NOTES
CVICU Admission Note    Name: Julieta Borges  MRN: [id number]    CC: Postoperative Critical Care Management     Indication for Surgery/Procedure: Symptomatic Left Carotid Artery Stenosis     Important/Relevant PMH/PSH: TIA, HTN, HPL, Afib, DM, ulcerative colitis, former smoker     Procedure/Surgeries: 10/3/2022 Left Carotid Endarterectomy       Physical Exam:    /81   Pulse 91   Temp 98.1 °F (36.7 °C) (Temporal)   Resp 18   Ht 6' 1\" (1.854 m)   Wt 167 lb 9.6 oz (76 kg)   SpO2 99%   BMI 22.11 kg/m²     Recent Labs     10/03/22  0841   WBC 5.7   RBC 4.19   HGB 12.4*   HCT 36.1*   MCV 86.2   MCH 29.6   MCHC 34.3   RDW 13.3   *   MPV 10.1     Recent Labs     10/03/22  0841      K 3.4*      CO2 26   BUN 17   CREATININE 0.9   GLUCOSE 117*   CALCIUM 8.8       General Appearance: Arrived to ICU drowsy, following commands. Hypertensive. Eyes: PERRL  Pulmonary: Diminished bibasilar. No wheezes, no accessory muscle use noted   Cardiovascular: IRR, no heaves or thrills palpated   Telemetry: Afib  Abdomen: Soft, nontender  Extremities:  Palpable pulses all extremities, no edema   Neurologic/Psych: Awake, CALHOUN x4 to command, slight RUE weakness, tongue slight deviation to left   Skin: Warm and dry    Incision: Left neck well approximated      Assessment/Plan: Day of Surgery     1.  Symptomatic left ICA stenosis S/p Left CEA   - Frequent neurovascular checks, monitor incision for signs of swelling/hematoma   - ASA, resume Elqiuis for Afib tomorrow, Pravastatin (LDL goal <70)  - NPO, IVF @100cc/hr  - Carr catheter to GD  - Bedrest  - Perioperative Ancef  - PRN morphine for pain management   - Maintain SBP <160, start Cardizem gtt infusion post op    Neurology signed off, follow up in Stroke Clinic      Electronically signed by WILIAM Silva - CNP on 10/3/2022 at 1:52 PM

## 2022-10-03 NOTE — PROGRESS NOTES
[facility]  Neurology Consult    Date:  10/3/2022  Patient Name:  Allison Sarkar  YOB: 1955  MRN: [id number]     PCP:  Sola Mace MD   Referring:  No ref. provider found      Chief Complaint: TIA    History obtained from: Patient and chart    Assessment  Embolic, watershed distribution acute ischemic stroke secondary to symptomatic left ICA stenosis    Plan  Continue aspirin per vascular surgery recommendations  CEA planned with vascular surgery on 10/3  Eliquis on hold for upcoming surgery, currently on therapeutic Lovenox for his history of A. Fib  Statin therapy with goal LDL less than 70  Stroke clinic follow-up  Neuro will sign off, please call with questions . Okay to d/c from neuro once okay with others     History of Present Illness:  Allison Sarkar is a 69 y.o. right handed male presenting for evaluation of tia. Patient presented with altered mental status and right arm weakness. Symptoms resolved in the ED. CTA of the head and neck revealed 90% stenosis of the left ICA. Vascular has already evaluated and plans for a left CEA on 10/3. He does have a history of A. fib and is on Eliquis. This has been held for upcoming surgery. He is currently on therapeutic Lovenox. He is also been started on daily baby aspirin. He was on Pravachol 20 mg prior to admission and this was continued. LDL is 49. A1c is 5.8. Blood pressure on admission was 173/97. MRI brain revealed embolic appearing stroke on the L in the watershed distribution. No chest pain or palpitations  No SOB  No vertigo, lightheadedness or loss of consciousness  No falls, tripping or stumbling  No incontinence of bowels or bladder  No itching or bruising appreciated  No numbness, tingling or focal arm/leg weakness    ROS otherwise negative      Allergies:       Allergies   Allergen Reactions    Sulfa Antibiotics Other (See Comments)     MOUTH BLISTERS        Physical Examination  Vitals Vitals:    10/02/22 1515 10/02/22 1930 10/03/22 0742 10/03/22 0843   BP: 115/67 139/85 130/81 130/81   Pulse: 84 82 77 91   Resp: 18 18 18    Temp: 97 °F (36.1 °C) 97.6 °F (36.4 °C) 98.1 °F (36.7 °C)    TempSrc: Temporal Temporal Temporal    SpO2: 100% 100% 99%    Weight:       Height:            General: Patient appears in no acute distress. Awake and oriented x 4  HEENT: Normocephalic, atraumatic  Chest: effort normal   Heart: NSR on tele   Extremities/Peripheral vascular: No edema/swelling noted. No cold limbs noted. Neurologic Examination    Mental Status  Alert, and oriented to person, place and time. Speech is fluent with intact comprehension. No evidence of memory impairment. Attention and concentration appeared normal.     Cranial Nerves  II. Visual fields full to confrontation bilaterally. III, IV, VI: Pupils equally round and reactive to light, 3 to 2 mm bilaterally. EOMs: full, no nystagmus. V. Facial sensation intact to light touch bilaterally  VII: Facial movements symmetric and strong  VIII: Hearing intact to voice  IX,X: Palate elevates symmetrically.  No dysarthria  XI: Sternocleidomastoid and trapezius 5/5 bilaterally   XII: Tongue is midline    Motor     Right Left   Right Left   Deltoid 5 5  Hip Flexion 5 5   Biceps      5  5  Knee Extension 5 5   Triceps 5 5  Knee Flexion 5 5   Handgrip 5 5  Ankle Dorsiflexion 5 5       Ankle Plantarflexion 5 5     Tone: Normal in all four limbs    Bulk: Normal in all four limbs with no evidence of atrophy    Pronator drift: absent bilaterally    Sensation  Light Touch: Intact distally in all four limbs    Reflexes     Right Left   Biceps 2 2   Brachioradialis 2 2   Triceps 2 2   Patellar 2 2   Achilles 2 2   ankle clonus none none   Babinski absent absent     Coordination  Rapid alternating movements normal in bilateral upper extremities  Finger to nose testing normal bilaterally  Heel to shin testing normal bilaterally    Gait  Deferred for safety/fall consideration    Labs  Recent Labs     10/01/22  1346 10/03/22  0841   NA  --  144   K  --  3.4*   CL  --  107   CO2  --  26   BUN  --  17   CREATININE  --  0.9   GLUCOSE  --  117*   CALCIUM  --  8.8   WBC  --  5.7   RBC  --  4.19   HGB  --  12.4*   HCT  --  36.1*   MCV  --  86.2   MCH  --  29.6   MCHC  --  34.3   RDW  --  13.3   PLT  --  123*   MPV  --  10.1   HDL 46  --    LDLCALC 49  --    LABA1C 5.8*  --        Imaging    CTA head/neck  1. No perfusion mismatch. 2. No intracranial large vessel occlusion or aneurysm. 3. Severe stenosis of the intracranial V4 segment of the right vertebral  artery. Moderate stenosis of the intracranial left vertebral artery. 4. Severe high-grade stenosis measuring at least 90% within the proximal left  cervical internal carotid artery at the origin. 5. Approximately 50% diameter stenosis involving the right proximal cervical  ICA secondary to atherosclerosis. MRI brain   1. Multifocal acute/early subacute infarcts are noted in the left cerebral  hemisphere, in a watershed distribution. 2. Cerebral parenchymal volume loss with mild chronic microvascular white  matter ischemic disease. 3. Mass along the anterior right nasal cavity measuring 2.5 x 0.9 cm which  may represent a polyp. Direct visualization is recommended.     Echo - report pending     I have personally reviewed the following images: CT head , MRI brain       Electronically signed by ANDREW Barlow on 10/3/2022 at 11:54 AM

## 2022-10-03 NOTE — PLAN OF CARE
Problem: Chronic Conditions and Co-morbidities  Goal: Patient's chronic conditions and co-morbidity symptoms are monitored and maintained or improved  10/3/2022 1529 by Alejandra Yadav RN  Outcome: Progressing  10/3/2022 0457 by Minna Richardson RN  Outcome: Progressing     Problem: Discharge Planning  Goal: Discharge to home or other facility with appropriate resources  10/3/2022 1529 by Alejandra Yadav RN  Outcome: Progressing  10/3/2022 0457 by Minna Richardson RN  Outcome: Progressing     Problem: Neurosensory - Adult  Goal: Achieves stable or improved neurological status  Outcome: Progressing  Goal: Absence of seizures  Outcome: Progressing  Goal: Remains free of injury related to seizures activity  Outcome: Progressing  Goal: Achieves maximal functionality and self care  Outcome: Progressing     Problem: Cardiovascular - Adult  Goal: Maintains optimal cardiac output and hemodynamic stability  Outcome: Progressing  Goal: Absence of cardiac dysrhythmias or at baseline  Outcome: Progressing

## 2022-10-03 NOTE — OP NOTE
Operative Note      Patient: Nicole Gonzalez  YOB: 1955  MRN: [id number]    Date of Procedure: 10/3/2022    Pre-Op Diagnosis: Stenosis of left carotid artery [I65.22], symptomatic    Post-Op Diagnosis: Same       Procedure(s):  CAROTID ENDARTERECTOMY, left    Surgeon(s):  Clarnce Osler, MD    Assistant:   Surgical Assistant: Becka Ryan  Resident: Felipa Stewart,     Anesthesia: General    Estimated Blood Loss (mL): less than 698     Complications: None    Specimens:   * No specimens in log *    Implants:  * No implants in log *      Drains:   Urinary Catheter 10/03/22 Carr (Active)       Findings:     DESCRIPTION OF PROCEDURE: The patient was identified and the procedure was confirmed. The left neck was prepped and draped in the usual sterile fashion. A skin incision was made along the anterior border of the sternocleidomastoid muscle and carried down through the subcutaneous tissue. Dissection continued through the platysma and along the anterior border of the sternocleidomastoid muscle. The common facial vein was divided between silk ties. The common carotid artery was identified and dissected free from the surrounding tissues. It was surrounded proximally in the soft portion with an umbilical tape. The vagus nerve was deep to the artery and preserved. Dissection continued along the carotid artery and the external carotid artery and its superior thyroid branch were dissected free from the surrounding tissues and surrounded with vessel loops for control. The bifurcation was high in the neck. The patient was then heparinized, maintaining an activated clotting time greater than 300 seconds. Dissection continued along the internal carotid artery, which was freed from the surrounding tissues and surrounded with a vessel loop for control. The hypoglossal nerve was identified and preserved.     The vessel loops on the external carotid artery branches were controlled and the internal carotid artery was clamped. The common carotid artery was clamped and then the internal carotid artery was transected at its origin. There was a 90% stenosis in the origin of the internal carotid artery. An eversion endarterectomy was performed of the internal carotid artery obtaining a smooth end point. A standard endarterectomy was then performed of the common carotid artery obtaining smooth end points. Loose fibrinous debris was removed from the vessel bed, which was flushed with heparinized saline solution. The internal carotid artery was then reimplanted onto the common carotid artery using a running 6-0 Prolene suture. Prior to completing the closure, the vessels were flushed and back-bled and the closure was completed. Flow was restored initially through the external carotid artery followed by the internal carotid artery. Flow through the vessels was confirmed with the handheld Doppler probe. The patient was given protamine and hemostasis was obtained. The incision was irrigated with antibiotic saline solution. The platysma was approximated with interrupted 3-0 Vicryl sutures and 0.25% Marcaine was injected into the subcutaneous tissue surrounding the incision. The skin was closed with a subcuticular Vicryl stitch and skin adhesive was applied to the skin incision in the operating room. Needle, sponge, and instrument counts were reported as correct x2. The patient tolerated the procedure and was transferred to the Cardiovascular ICU in satisfactory condition.      Electronically signed by Graciela Mckeon MD on 10/3/2022 at 1:14 PM

## 2022-10-04 LAB
ANION GAP SERPL CALCULATED.3IONS-SCNC: 13 MMOL/L (ref 7–16)
BUN BLDV-MCNC: 16 MG/DL (ref 6–23)
CALCIUM SERPL-MCNC: 8.4 MG/DL (ref 8.6–10.2)
CHLORIDE BLD-SCNC: 106 MMOL/L (ref 98–107)
CO2: 21 MMOL/L (ref 22–29)
CREAT SERPL-MCNC: 0.7 MG/DL (ref 0.7–1.2)
GFR AFRICAN AMERICAN: >60
GFR NON-AFRICAN AMERICAN: >60 ML/MIN/1.73
GLUCOSE BLD-MCNC: 279 MG/DL (ref 74–99)
HCT VFR BLD CALC: 32.1 % (ref 37–54)
HEMOGLOBIN: 11.5 G/DL (ref 12.5–16.5)
MCH RBC QN AUTO: 29.7 PG (ref 26–35)
MCHC RBC AUTO-ENTMCNC: 35.8 % (ref 32–34.5)
MCV RBC AUTO: 82.9 FL (ref 80–99.9)
METER GLUCOSE: 138 MG/DL (ref 74–99)
METER GLUCOSE: 148 MG/DL (ref 74–99)
METER GLUCOSE: 241 MG/DL (ref 74–99)
METER GLUCOSE: 255 MG/DL (ref 74–99)
PDW BLD-RTO: 13 FL (ref 11.5–15)
PLATELET # BLD: 114 E9/L (ref 130–450)
PMV BLD AUTO: 10.1 FL (ref 7–12)
POTASSIUM SERPL-SCNC: 4.2 MMOL/L (ref 3.5–5)
RBC # BLD: 3.87 E12/L (ref 3.8–5.8)
SODIUM BLD-SCNC: 140 MMOL/L (ref 132–146)
WBC # BLD: 7.6 E9/L (ref 4.5–11.5)

## 2022-10-04 PROCEDURE — 2500000003 HC RX 250 WO HCPCS: Performed by: SURGERY

## 2022-10-04 PROCEDURE — 80048 BASIC METABOLIC PNL TOTAL CA: CPT

## 2022-10-04 PROCEDURE — 36415 COLL VENOUS BLD VENIPUNCTURE: CPT

## 2022-10-04 PROCEDURE — 2000000000 HC ICU R&B

## 2022-10-04 PROCEDURE — 6370000000 HC RX 637 (ALT 250 FOR IP): Performed by: SURGERY

## 2022-10-04 PROCEDURE — 85027 COMPLETE CBC AUTOMATED: CPT

## 2022-10-04 PROCEDURE — 82962 GLUCOSE BLOOD TEST: CPT

## 2022-10-04 PROCEDURE — 6360000002 HC RX W HCPCS: Performed by: NURSE PRACTITIONER

## 2022-10-04 PROCEDURE — 37799 UNLISTED PX VASCULAR SURGERY: CPT

## 2022-10-04 PROCEDURE — 2580000003 HC RX 258: Performed by: SURGERY

## 2022-10-04 PROCEDURE — 2580000003 HC RX 258: Performed by: NURSE PRACTITIONER

## 2022-10-04 PROCEDURE — S5553 INSULIN LONG ACTING 5 U: HCPCS | Performed by: SURGERY

## 2022-10-04 RX ORDER — OXYCODONE HYDROCHLORIDE 5 MG/1
5 TABLET ORAL EVERY 4 HOURS PRN
Status: DISCONTINUED | OUTPATIENT
Start: 2022-10-04 | End: 2022-10-05 | Stop reason: HOSPADM

## 2022-10-04 RX ADMIN — AMLODIPINE BESYLATE 10 MG: 10 TABLET ORAL at 08:45

## 2022-10-04 RX ADMIN — BALSALAZIDE DISODIUM 2250 MG: 750 CAPSULE ORAL at 20:05

## 2022-10-04 RX ADMIN — ASPIRIN 81 MG: 81 TABLET, COATED ORAL at 08:50

## 2022-10-04 RX ADMIN — APIXABAN 5 MG: 5 TABLET, FILM COATED ORAL at 20:04

## 2022-10-04 RX ADMIN — DEXTROSE MONOHYDRATE 7.5 MG/HR: 50 INJECTION, SOLUTION INTRAVENOUS at 00:11

## 2022-10-04 RX ADMIN — APIXABAN 5 MG: 5 TABLET, FILM COATED ORAL at 08:45

## 2022-10-04 RX ADMIN — MESALAMINE 1000 MG: 1000 SUPPOSITORY RECTAL at 20:06

## 2022-10-04 RX ADMIN — SODIUM CHLORIDE: 9 INJECTION, SOLUTION INTRAVENOUS at 06:21

## 2022-10-04 RX ADMIN — INSULIN GLARGINE-YFGN 26 UNITS: 100 INJECTION, SOLUTION SUBCUTANEOUS at 08:46

## 2022-10-04 RX ADMIN — SODIUM CHLORIDE, PRESERVATIVE FREE 10 ML: 5 INJECTION INTRAVENOUS at 08:46

## 2022-10-04 RX ADMIN — DOXAZOSIN 4 MG: 4 TABLET ORAL at 20:05

## 2022-10-04 RX ADMIN — METOPROLOL SUCCINATE 50 MG: 50 TABLET, FILM COATED, EXTENDED RELEASE ORAL at 08:45

## 2022-10-04 RX ADMIN — INSULIN LISPRO 2 UNITS: 100 INJECTION, SOLUTION INTRAVENOUS; SUBCUTANEOUS at 08:46

## 2022-10-04 RX ADMIN — BALSALAZIDE DISODIUM 2250 MG: 750 CAPSULE ORAL at 13:38

## 2022-10-04 RX ADMIN — BALSALAZIDE DISODIUM 2250 MG: 750 CAPSULE ORAL at 09:33

## 2022-10-04 RX ADMIN — DOXAZOSIN 2 MG: 4 TABLET ORAL at 08:50

## 2022-10-04 RX ADMIN — SODIUM CHLORIDE, PRESERVATIVE FREE 10 ML: 5 INJECTION INTRAVENOUS at 20:04

## 2022-10-04 RX ADMIN — PRAVASTATIN SODIUM 20 MG: 20 TABLET ORAL at 20:04

## 2022-10-04 RX ADMIN — INSULIN LISPRO 4 UNITS: 100 INJECTION, SOLUTION INTRAVENOUS; SUBCUTANEOUS at 13:19

## 2022-10-04 RX ADMIN — LISINOPRIL 20 MG: 20 TABLET ORAL at 08:45

## 2022-10-04 RX ADMIN — CEFAZOLIN 2000 MG: 2 INJECTION, POWDER, FOR SOLUTION INTRAMUSCULAR; INTRAVENOUS at 01:43

## 2022-10-04 ASSESSMENT — PAIN SCALES - GENERAL: PAINLEVEL_OUTOF10: 0

## 2022-10-04 NOTE — PROGRESS NOTES
Vascular Surgery Progress Note    Pt is being seen in f/u today regarding L ICA stenosis s/p L CEA on 10/3. Subjective  Pt s/e. Patients reports no concerns this morning, states that he is hungry. Current Medications:    dilTIAZem (CARDIZEM) 100 mg in dextrose 5% 100 mL (ADD-[place]) 5 mg/hr (10/04/22 9394)    sodium chloride 100 mL/hr at 10/04/22 0621    dextrose      sodium chloride        morphine **OR** morphine, labetalol, glucose, dextrose bolus **OR** dextrose bolus, glucagon (rDNA), dextrose, perflutren lipid microspheres, sodium chloride flush, sodium chloride, ondansetron **OR** ondansetron, polyethylene glycol, acetaminophen **OR** acetaminophen    apixaban  5 mg Oral BID    mesalamine  1,000 mg Rectal Nightly    doxazosin  2 mg Oral QAM    aspirin  81 mg Oral Daily    balsalazide  2,250 mg Oral TID    hydrocortisone  25 mg Rectal BID    insulin glargine-yfgn  26 Units SubCUTAneous QAM    metoprolol succinate  50 mg Oral QAM    pravastatin  20 mg Oral Nightly    sodium chloride flush  5-40 mL IntraVENous 2 times per day    insulin lispro  0-8 Units SubCUTAneous TID WC    insulin lispro  0-4 Units SubCUTAneous Nightly    amLODIPine  10 mg Oral Daily    And    lisinopril  20 mg Oral Daily    doxazosin  4 mg Oral Nightly        PHYSICAL EXAM:    BP (!) 168/77   Pulse 84   Temp 98.1 °F (36.7 °C) (Axillary)   Resp 18   Ht 6' 1\" (1.854 m)   Wt 167 lb 9.6 oz (76 kg)   SpO2 97%   BMI 22.11 kg/m²     Intake/Output Summary (Last 24 hours) at 10/4/2022 0651  Last data filed at 10/4/2022 8784  Gross per 24 hour   Intake 3788.02 ml   Output 2740 ml   Net 1048.02 ml          Gen: awake, alert and oriented x3, no apparent distress  Neck: Left neck incision CDI with no drainage, hematoma or swelling  CVS: RR  Resp: No increased work of breathing  Abd: Soft, non-tender, non-distended. No guarding, rigidity, rebound. EXTREMITIES:   MAEx4. Atraumatic. No LE edema.   NEURO: CN II-XI intact, minor tongue deviation to left on protrusion but moves freely and easily in all directions.   UEs, LE sensation intact, RUE/RLE 4/5, LUE/LLE 5/5    LABS:    Lab Results   Component Value Date    WBC 7.6 10/04/2022    HGB 11.5 (L) 10/04/2022    HCT 32.1 (L) 10/04/2022     (L) 10/04/2022    PROTIME 13.6 (H) 10/03/2022    INR 1.3 10/03/2022    APTT 32.0 09/30/2022    K 4.2 10/04/2022    BUN 16 10/04/2022    CREATININE 0.7 10/04/2022       A/P  69 y.o. male w symptomatic left ICA stenosis s/p left CEA 10/3     Still on Cardizem drip  Discontinue arterial line  Discontinue Carr catheter  Okay for regular diet  Encourage out of bed, incentive spirometer  Maintain SBP less than 160  As needed pain control Tylenol, morphine        Tammi Cluster

## 2022-10-04 NOTE — PROGRESS NOTES
Hospital Medicine    Subjective:  pt pod # 1 seen in icu pt alert conversiv      Current Facility-Administered Medications:     oxyCODONE (ROXICODONE) immediate release tablet 5 mg, 5 mg, Oral, Q4H PRN, Pernell Palma MD    dilTIAZem 100 mg in dextrose 5 % 100 mL infusion (ADD-[place]), 2.5-15 mg/hr, IntraVENous, Continuous, Pernell Palma MD, Last Rate: 5 mL/hr at 10/04/22 8502, 5 mg/hr at 10/04/22 9574    apixaban (ELIQUIS) tablet 5 mg, 5 mg, Oral, BID, Pernell Palma MD    labetalol (NORMODYNE;TRANDATE) injection 10 mg, 10 mg, IntraVENous, Q4H PRN, Gideon Woodall APRN - CNP, 10 mg at 10/03/22 1820    glucose chewable tablet 16 g, 4 tablet, Oral, PRN, Pernell Palma MD    dextrose bolus 10% 125 mL, 125 mL, IntraVENous, PRN **OR** dextrose bolus 10% 250 mL, 250 mL, IntraVENous, PRN, Pernell Palma MD    glucagon (rDNA) injection 1 mg, 1 mg, SubCUTAneous, PRN, Pernell Palma MD    dextrose 10 % infusion, , IntraVENous, Continuous PRN, Pernell Palma MD    mesalamine (CANASA) suppository 1,000 mg  (Patient Supplied), 1,000 mg, Rectal, Nightly, Pernell Palma MD, 1,000 mg at 10/02/22 2104    doxazosin (CARDURA) tablet 2 mg, 2 mg, Oral, QAM, Pernell Palma MD, 2 mg at 10/03/22 7864    perflutren lipid microspheres (DEFINITY) injection 1.65 mg, 1.5 mL, IntraVENous, ONCE PRN, Pernell Palma MD    aspirin EC tablet 81 mg, 81 mg, Oral, Daily, Pernell Palma MD, 81 mg at 10/03/22 0841    balsalazide (COLAZAL) capsule 2,250 mg  (Patient Supplied), 2,250 mg, Oral, TID, Pernell Palma MD, 2,250 mg at 10/03/22 0846    hydrocortisone (ANUSOL-HC) suppository 25 mg, 25 mg, Rectal, BID, Pernell Palma MD, 25 mg at 09/30/22 2332    insulin glargine-yfgn (SEMGLEE-YFGN) injection vial 26 Units, 26 Units, SubCUTAneous, JUVENCIO, Pernell Palma MD, 26 Units at 10/01/22 0943    metoprolol succinate (TOPROL XL) extended release tablet 50 mg, 50 mg, Oral, JUVENCIO, Pernell Palma MD, 50 mg at 10/03/22 6598    pravastatin (PRAVACHOL) tablet 20 mg, 20 mg, Oral, Nightly, Shirin Green MD, 20 mg at 10/03/22 2130    sodium chloride flush 0.9 % injection 5-40 mL, 5-40 mL, IntraVENous, 2 times per day, Shirin Green MD, 10 mL at 10/03/22 2035    sodium chloride flush 0.9 % injection 5-40 mL, 5-40 mL, IntraVENous, PRN, Shirin Green MD, 10 mL at 10/03/22 1746    0.9 % sodium chloride infusion, , IntraVENous, PRN, Shirin Green MD    ondansetron (ZOFRAN-ODT) disintegrating tablet 4 mg, 4 mg, Oral, Q8H PRN **OR** ondansetron (ZOFRAN) injection 4 mg, 4 mg, IntraVENous, Q6H PRN, Shirin Green MD    polyethylene glycol (GLYCOLAX) packet 17 g, 17 g, Oral, Daily PRN, Shirin Green MD    acetaminophen (TYLENOL) tablet 650 mg, 650 mg, Oral, Q6H PRN **OR** acetaminophen (TYLENOL) suppository 650 mg, 650 mg, Rectal, Q6H PRN, Shirin Green MD    insulin lispro (HUMALOG) injection vial 0-8 Units, 0-8 Units, SubCUTAneous, TID WC, Shirin Green MD, 2 Units at 10/01/22 1755    insulin lispro (HUMALOG) injection vial 0-4 Units, 0-4 Units, SubCUTAneous, Nightly, Shirin Green MD    amLODIPine (NORVASC) tablet 10 mg, 10 mg, Oral, Daily, 10 mg at 10/03/22 0843 **AND** lisinopril (PRINIVIL;ZESTRIL) tablet 20 mg, 20 mg, Oral, Daily, Shirin Green MD, 20 mg at 10/03/22 0845    doxazosin (CARDURA) tablet 4 mg, 4 mg, Oral, Nightly, Shirin Green MD, 4 mg at 10/03/22 2133    Objective:    BP (!) 168/77   Pulse 72   Temp 98.1 °F (36.7 °C) (Axillary)   Resp 13   Ht 6' 1\" (1.854 m)   Wt 167 lb 9.6 oz (76 kg)   SpO2 95%   BMI 22.11 kg/m²     Heart:  irreg  Lungs:  ctab  Abd: + bs soft nontender  Extrem:  w/o edema    CBC with Differential:    Lab Results   Component Value Date/Time    WBC 7.6 10/04/2022 04:45 AM    RBC 3.87 10/04/2022 04:45 AM    HGB 11.5 10/04/2022 04:45 AM    HCT 32.1 10/04/2022 04:45 AM     10/04/2022 04:45 AM    MCV 82.9 10/04/2022 04:45 AM    MCH 29.7 10/04/2022 04:45 AM MCHC 35.8 10/04/2022 04:45 AM    RDW 13.0 10/04/2022 04:45 AM    NRBC 0.0 02/07/2021 06:00 AM    LYMPHOPCT 11.6 09/30/2022 10:30 AM    MONOPCT 5.9 09/30/2022 10:30 AM    BASOPCT 0.5 09/30/2022 10:30 AM    MONOSABS 0.36 09/30/2022 10:30 AM    LYMPHSABS 0.71 09/30/2022 10:30 AM    EOSABS 0.11 09/30/2022 10:30 AM    BASOSABS 0.03 09/30/2022 10:30 AM     CMP:    Lab Results   Component Value Date/Time     10/04/2022 04:45 AM    K 4.2 10/04/2022 04:45 AM    K 4.0 09/30/2022 10:30 AM     10/04/2022 04:45 AM    CO2 21 10/04/2022 04:45 AM    BUN 16 10/04/2022 04:45 AM    CREATININE 0.7 10/04/2022 04:45 AM    GFRAA >60 10/04/2022 04:45 AM    LABGLOM >60 10/04/2022 04:45 AM    GLUCOSE 279 10/04/2022 04:45 AM    GLUCOSE 119 12/30/2010 04:50 AM    PROT 6.8 09/30/2022 10:30 AM    LABALBU 4.5 09/30/2022 10:30 AM    CALCIUM 8.4 10/04/2022 04:45 AM    BILITOT 0.6 09/30/2022 10:30 AM    ALKPHOS 90 09/30/2022 10:30 AM    AST 17 09/30/2022 10:30 AM    ALT <5 09/30/2022 10:30 AM     Warfarin PT/INR:    Lab Results   Component Value Date    INR 1.3 10/03/2022    INR 1.2 09/30/2022    INR 1.4 02/11/2021    PROTIME 13.6 (H) 10/03/2022    PROTIME 13.4 (H) 09/30/2022    PROTIME 16.3 (H) 02/11/2021       Assessment:    Principal Problem:    RUE weakness  Active Problems:    Symptomatic stenosis of left carotid artery    TIA (transient ischemic attack)  Resolved Problems:    * No resolved hospital problems.  *      Plan:  Advance diet per surgery resume po meds dc planning        Sonia Dooley DO  8:38 AM  10/4/2022

## 2022-10-04 NOTE — PROGRESS NOTES
Physician Progress Note      Miguel Sandoval  CSN #:                  [id number]  :                       1955  ADMIT DATE:       2022 3:07 PM  [address] DATE:  RESPONDING  PROVIDER #: Justino Purcell DO          QUERY TEXT:    Dear Provider,    Pt admitted with right arm weakness, unsteady gait and expressive aphasia and   has TIA documented. Noted neurology consultant 10/3 documentation of embolic,   watershed distribution acute ischemic stroke secondary to symptomatic left   ICA  stenosis. If possible, please document in progress notes and discharge   summary if you are in agreement with the neurology consultant: The medical record reflects the following:  Risk Factors: 90% left ICA stenosis, CAD, Afib on Eliquis  Clinical Indicators: Present with right arm weakness, unsteady gait and   expressive aphasia. MRI brain impression of multifocal acute/early subacute   infarcts are noted in the left cerebral hemisphere, in a watershed   distribution. 10/3 Neurology note: MRI brain revealed embolic appearing stroke on the L in   the watershed distribution  Treatment: Carotid endarterectomy, ASA, resume Eliquis for Afib, Pravastatin    Thank you,  Omkar Hernandez RN  Clinical documentation Improvement  [phone]  Options provided:  -- Cerebral infarction due to embolism of ICA confirmed present on admission  -- Cerebral infarction due to embolism of ICA  ruled out  -- Other - I will add my own diagnosis  -- Disagree - Not applicable / Not valid  -- Disagree - Clinically unable to determine / Unknown  -- Refer to Clinical Documentation Reviewer    PROVIDER RESPONSE TEXT:    The diagnosis of 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     Query created by: Sukhwinder Howard on 10/4/2022 11:56 AM      Electronically signed by:  Justino Purcell DO 10/4/2022 12:35 PM

## 2022-10-04 NOTE — ANESTHESIA POSTPROCEDURE EVALUATION
Department of Anesthesiology  Postprocedure Note    Patient: Jaye Driscoll  MRN: [id number]  YOB: 1955  Date of evaluation: 10/3/2022      Procedure Summary     Date: 10/03/22 Room / Location: [facility] OR 04 / [facility]    Anesthesia Start: 7819 Anesthesia Stop: 7617    Procedure: CAROTID ENDARTERECTOMY (Left) Diagnosis:       Stenosis of left carotid artery      (Stenosis of left carotid artery [I65.22])    Surgeons: Gunner Rothman MD Responsible Provider: Ifrah Menendez DO    Anesthesia Type: general ASA Status: 4          Anesthesia Type: No value filed.     Blanche Phase I: Blanche Score: 9    Blanche Phase II:        Anesthesia Post Evaluation    Patient location during evaluation: PACU  Patient participation: complete - patient participated  Level of consciousness: awake and alert  Pain score: 1  Airway patency: patent  Nausea & Vomiting: no nausea and no vomiting  Complications: no  Cardiovascular status: hemodynamically stable  Respiratory status: acceptable  Hydration status: euvolemic

## 2022-10-04 NOTE — CARE COORDINATION
Pod #1 s/p L cea. On cardizem drip. Met with pt and wife in room. Offered [facility]hc and choices. They are agreeable to [facility] and chose [facility]. [facility], 2. [place] [facility]. Referral made to Angelito Serrano at [facility]. Await acceptance. Plan is to transfer to stepdown today, then tentative plan for dc home in am.   [facility] accepted.

## 2022-10-04 NOTE — PROGRESS NOTES
VASCULAR SURGERY PROGRESS NOTE    Pt is being seen in f/u today regarding symptomatic left ICA stenosis and left CEA 10/3    SUBJECTIVE  Pt seen/examined. Has no complaints at this time, states that he is hungry. CURRENT MEDICATIONS    dilTIAZem (CARDIZEM) 100 mg in dextrose 5% 100 mL (ADD-[place]) 5 mg/hr (10/04/22 5510)    sodium chloride 100 mL/hr at 10/04/22 0621    dextrose      sodium chloride        morphine **OR** morphine, labetalol, glucose, dextrose bolus **OR** dextrose bolus, glucagon (rDNA), dextrose, perflutren lipid microspheres, sodium chloride flush, sodium chloride, ondansetron **OR** ondansetron, polyethylene glycol, acetaminophen **OR** acetaminophen    apixaban  5 mg Oral BID    mesalamine  1,000 mg Rectal Nightly    doxazosin  2 mg Oral QAM    aspirin  81 mg Oral Daily    balsalazide  2,250 mg Oral TID    hydrocortisone  25 mg Rectal BID    insulin glargine-yfgn  26 Units SubCUTAneous QAM    metoprolol succinate  50 mg Oral QAM    pravastatin  20 mg Oral Nightly    sodium chloride flush  5-40 mL IntraVENous 2 times per day    insulin lispro  0-8 Units SubCUTAneous TID WC    insulin lispro  0-4 Units SubCUTAneous Nightly    amLODIPine  10 mg Oral Daily    And    lisinopril  20 mg Oral Daily    doxazosin  4 mg Oral Nightly        PHYSICAL EXAM   BP (!) 168/77   Pulse 84   Temp 98.1 °F (36.7 °C) (Axillary)   Resp 18   Ht 6' 1\" (1.854 m)   Wt 167 lb 9.6 oz (76 kg)   SpO2 97%   BMI 22.11 kg/m²     Intake/Output Summary (Last 24 hours) at 10/4/2022 0702  Last data filed at 10/4/2022 3159  Gross per 24 hour   Intake 3788.02 ml   Output 2740 ml   Net 1048.02 ml          GENERAL:  NAD. A&Ox3. HEAD/Heck: Left neck incision CDI, no swelling, no hematoma, no drainage  LUNGS:  No increased work of breathing. CARDIOVASCULAR: RR  ABDOMEN:  Soft, non-distended, non-tender. No guarding, rigidity, rebound. EXTREMITIES:   MAEx4. Atraumatic. No LE edema.   NEURO: CN II-XI intact, minor tongue deviation to left on protrusion but moves freely and easily in all directions. UEs, LE sensation intact, RUE/RLE 4/5, LUE/LLE 5/5    LABS    Lab Results   Component Value Date    WBC 7.6 10/04/2022    HGB 11.5 (L) 10/04/2022    HCT 32.1 (L) 10/04/2022     (L) 10/04/2022    PROTIME 13.6 (H) 10/03/2022    INR 1.3 10/03/2022    APTT 32.0 09/30/2022    K 4.2 10/04/2022    BUN 16 10/04/2022    CREATININE 0.7 10/04/2022       ASSESSMENT/PLAN  69 y.o. male w symptomatic left ICA stenosis s/p left CEA 10/3    Still on Cardizem drip  Discontinue arterial line  Discontinue Carr catheter  Okay for regular diet  Encourage out of bed, incentive spirometer  Maintain SBP less than 160  As needed pain control Tylenol, morphine    Will discuss with Dr. Daisy Joyce, DO  Surgery Resident PGY-1  10/4/2022  7:02 AM     Agree. Transfer to floor. Restart Eliquis. Ambulate with assist.  From vascular standpoint, likely OK for discharge tomorrow.

## 2022-10-04 NOTE — PROGRESS NOTES
Patient complained of not being able to fully swallow regular foods. Tongue is deviated to the left. NP notified and said this was already addressed post-op.

## 2022-10-04 NOTE — PROGRESS NOTES
CVICU Progress Note    Name: Ethel Ray  MRN: [id number]    CC: Postoperative Critical Care Management      Indication for Surgery/Procedure: Symptomatic Left Carotid Artery Stenosis      Important/Relevant PMH/PSH: TIA, HTN, HPL, Afib, DM, ulcerative colitis, former smoker      Procedure/Surgeries: 10/3/2022 Left Carotid Endarterectomy       Intake/Output Summary (Last 24 hours) at 10/4/2022 0831  Last data filed at 10/4/2022 0700  Gross per 24 hour   Intake 3788.02 ml   Output 2790 ml   Net 998.02 ml       Recent Labs     10/03/22  0841 10/04/22  0445   WBC 5.7 7.6   HGB 12.4* 11.5*   HCT 36.1* 32.1*   * 114*      Lab Results   Component Value Date/Time     10/04/2022 04:45 AM    K 4.2 10/04/2022 04:45 AM    K 4.0 09/30/2022 10:30 AM     10/04/2022 04:45 AM    CO2 21 10/04/2022 04:45 AM    BUN 16 10/04/2022 04:45 AM    CREATININE 0.7 10/04/2022 04:45 AM    GLUCOSE 279 10/04/2022 04:45 AM    GLUCOSE 119 12/30/2010 04:50 AM    CALCIUM 8.4 10/04/2022 04:45 AM    IONCA 1.25 12/30/2010 09:25 AM         Physical Exam:    BP (!) 168/77   Pulse 72   Temp 98.1 °F (36.7 °C) (Axillary)   Resp 13   Ht 6' 1\" (1.854 m)   Wt 167 lb 9.6 oz (76 kg)   SpO2 95%   BMI 22.11 kg/m²       General: Awake, alert. On Cardizem infusion for HTN and HR control. Eyes: PERRL, anicteric   Pulmonary: No wheezes, no accessory muscle use noted   Cardiovascular:  RRR, no heaves or thrills on palpation  Tele: SR  Abdomen: Soft, nontender, + BS   Extremities: Palpable pulses all extremities,  edema   Neurologic/Psych: A&Ox3, CALHOUN to command with 4/5 strength on RUE/RLE, tongue slightly deviated to left   Skin: Warm and dry  Incisions: left neck well approximated, no swelling noted      Assessment/Plan: POD #1    1.  Symptomatic left ICA stenosis S/p Left CEA   - Frequent neurovascular checks, monitor incision for signs of swelling/hematoma   - ASA, resume Elqiuis for Afib, Pravastatin (LDL goal <70)  - Advance diet, stop IVF   - remove hernandez, arterial line   - OOBTC, ambulate   - PRN oxycodone for pain management   - Remains on Cardizem gtt, home antihypertensives restarted  -Neurology signed off, follow up in Stroke Clinic      VTE Prophylaxis: Pharmacologic/Mechanical:  Yes, SCDs   Line infection prevention: Can CVC or arterial line be removed: yes, remove  Continued need for urinary catheter: no, remove     Dispo: Transfer to step down unit     Electronically signed by WILIAM Silva - CNP on 10/4/2022 at 8:31 AM

## 2022-10-04 NOTE — PROGRESS NOTES
Patient's wife brought home medications in, mesalamine suppositories and balsalazide capsules. Taken to pharmacy for verification. Pharmacy to call when medication can be picked up.

## 2022-10-05 ENCOUNTER — APPOINTMENT (OUTPATIENT)
Dept: GENERAL RADIOLOGY | Age: 67
DRG: 038 | End: 2022-10-05
Payer: MEDICARE

## 2022-10-05 VITALS
HEIGHT: 73 IN | TEMPERATURE: 97.7 F | OXYGEN SATURATION: 97 % | RESPIRATION RATE: 17 BRPM | DIASTOLIC BLOOD PRESSURE: 80 MMHG | HEART RATE: 108 BPM | BODY MASS INDEX: 22.21 KG/M2 | WEIGHT: 167.6 LBS | SYSTOLIC BLOOD PRESSURE: 111 MMHG

## 2022-10-05 LAB
METER GLUCOSE: 130 MG/DL (ref 74–99)
METER GLUCOSE: 249 MG/DL (ref 74–99)

## 2022-10-05 PROCEDURE — 6370000000 HC RX 637 (ALT 250 FOR IP): Performed by: SURGERY

## 2022-10-05 PROCEDURE — S5553 INSULIN LONG ACTING 5 U: HCPCS | Performed by: SURGERY

## 2022-10-05 PROCEDURE — 92611 MOTION FLUOROSCOPY/SWALLOW: CPT

## 2022-10-05 PROCEDURE — 2580000003 HC RX 258: Performed by: SURGERY

## 2022-10-05 PROCEDURE — 74230 X-RAY XM SWLNG FUNCJ C+: CPT

## 2022-10-05 PROCEDURE — 92526 ORAL FUNCTION THERAPY: CPT

## 2022-10-05 PROCEDURE — 82962 GLUCOSE BLOOD TEST: CPT

## 2022-10-05 PROCEDURE — 2500000003 HC RX 250 WO HCPCS: Performed by: RADIOLOGY

## 2022-10-05 RX ORDER — ACETAMINOPHEN 325 MG/1
650 TABLET ORAL EVERY 6 HOURS PRN
Qty: 120 TABLET | Refills: 3 | COMMUNITY
Start: 2022-10-05

## 2022-10-05 RX ORDER — MESALAMINE 1000 MG/1
1000 SUPPOSITORY RECTAL NIGHTLY
Qty: 60 SUPPOSITORY | Refills: 3
Start: 2022-10-05

## 2022-10-05 RX ORDER — ASPIRIN 81 MG/1
81 TABLET ORAL DAILY
Qty: 30 TABLET | Refills: 3 | COMMUNITY
Start: 2022-10-06

## 2022-10-05 RX ADMIN — INSULIN LISPRO 2 UNITS: 100 INJECTION, SOLUTION INTRAVENOUS; SUBCUTANEOUS at 12:04

## 2022-10-05 RX ADMIN — METOPROLOL SUCCINATE 50 MG: 50 TABLET, FILM COATED, EXTENDED RELEASE ORAL at 09:05

## 2022-10-05 RX ADMIN — BARIUM SULFATE 15 ML: 0.81 POWDER, FOR SUSPENSION ORAL at 10:43

## 2022-10-05 RX ADMIN — ASPIRIN 81 MG: 81 TABLET, COATED ORAL at 09:04

## 2022-10-05 RX ADMIN — SODIUM CHLORIDE, PRESERVATIVE FREE 10 ML: 5 INJECTION INTRAVENOUS at 09:03

## 2022-10-05 RX ADMIN — INSULIN GLARGINE-YFGN 26 UNITS: 100 INJECTION, SOLUTION SUBCUTANEOUS at 09:04

## 2022-10-05 RX ADMIN — DOXAZOSIN 2 MG: 4 TABLET ORAL at 09:04

## 2022-10-05 RX ADMIN — BALSALAZIDE DISODIUM 2250 MG: 750 CAPSULE ORAL at 09:06

## 2022-10-05 RX ADMIN — AMLODIPINE BESYLATE 10 MG: 10 TABLET ORAL at 09:03

## 2022-10-05 RX ADMIN — LISINOPRIL 20 MG: 20 TABLET ORAL at 09:03

## 2022-10-05 RX ADMIN — BARIUM SULFATE 15 ML: 400 SUSPENSION ORAL at 10:42

## 2022-10-05 RX ADMIN — BARIUM SULFATE 15 ML: 400 PASTE ORAL at 10:42

## 2022-10-05 RX ADMIN — APIXABAN 5 MG: 5 TABLET, FILM COATED ORAL at 09:04

## 2022-10-05 ASSESSMENT — PAIN SCALES - GENERAL
PAINLEVEL_OUTOF10: 0
PAINLEVEL_OUTOF10: 0

## 2022-10-05 NOTE — PROGRESS NOTES
Vascular Surgery Progress Note    Pt is being seen in f/u today regarding L ICA stenosis s/p L CEA on 10/3. Subjective  Pt s/e. Patient reports his tongue feels numb today. He also reports a sore throat and sore jaw bone. Current Medications:    dextrose      sodium chloride        oxyCODONE, labetalol, glucose, dextrose bolus **OR** dextrose bolus, glucagon (rDNA), dextrose, perflutren lipid microspheres, sodium chloride flush, sodium chloride, ondansetron **OR** ondansetron, polyethylene glycol, acetaminophen **OR** acetaminophen    apixaban  5 mg Oral BID    mesalamine  1,000 mg Rectal Nightly    doxazosin  2 mg Oral QAM    aspirin  81 mg Oral Daily    balsalazide  2,250 mg Oral TID    insulin glargine-yfgn  26 Units SubCUTAneous QAM    metoprolol succinate  50 mg Oral QAM    pravastatin  20 mg Oral Nightly    sodium chloride flush  5-40 mL IntraVENous 2 times per day    insulin lispro  0-8 Units SubCUTAneous TID WC    insulin lispro  0-4 Units SubCUTAneous Nightly    amLODIPine  10 mg Oral Daily    And    lisinopril  20 mg Oral Daily    doxazosin  4 mg Oral Nightly        PHYSICAL EXAM:    /69   Pulse (!) 106   Temp 97.9 °F (36.6 °C) (Oral)   Resp 16   Ht 6' 1\" (1.854 m)   Wt 167 lb 9.6 oz (76 kg)   SpO2 97%   BMI 22.11 kg/m²     Intake/Output Summary (Last 24 hours) at 10/5/2022 0648  Last data filed at 10/5/2022 0500  Gross per 24 hour   Intake 1167.19 ml   Output 4625 ml   Net -3457.81 ml          Gen: awake, alert and oriented x3, no apparent distress  CVS: Tachycardiac, irregular rhythm   Resp: No increased work of breathing  Abd: Soft, non-tender, non-distended. No guarding, rigidity, rebound. EXTREMITIES:   MAEx4. Atraumatic. No LE edema.   NEURO:  UEs, LE sensation intact, RUE/RLE 4/5, LUE/LLE 5/5    LABS:    Lab Results   Component Value Date    WBC 7.6 10/04/2022    HGB 11.5 (L) 10/04/2022    HCT 32.1 (L) 10/04/2022     (L) 10/04/2022    PROTIME 13.6 (H) 10/03/2022    INR 1.3 10/03/2022    APTT 32.0 09/30/2022    K 4.2 10/04/2022    BUN 16 10/04/2022    CREATININE 0.7 10/04/2022       A/P  69 y.o. male w symptomatic left ICA stenosis s/p left CEA 10/3     - Cardizem stopped  - Eliquis started  - Continue regular diet  - Encourage out of bed, incentive spirometer  - Maintain SBP less than 160  - As needed pain control Tylenol, morphine         Junie Rowell

## 2022-10-05 NOTE — CARE COORDINATION
Pt discharging home today. [facility]. Notified Irais Herrmann at [facility] today.

## 2022-10-05 NOTE — PROGRESS NOTES
CVICU Progress Note    Name: Noris Sales  MRN: [id number]    CC: Postoperative Critical Care Management      Indication for Surgery/Procedure: Symptomatic Left Carotid Artery Stenosis      Important/Relevant PMH/PSH: TIA, HTN, HPL, Afib, DM, ulcerative colitis, former smoker      Procedure/Surgeries: 10/3/2022 Left Carotid Endarterectomy       Intake/Output Summary (Last 24 hours) at 10/5/2022 0858  Last data filed at 10/5/2022 0700  Gross per 24 hour   Intake 1167.19 ml   Output 4700 ml   Net -3532.81 ml       Recent Labs     10/03/22  0841 10/04/22  0445   WBC 5.7 7.6   HGB 12.4* 11.5*   HCT 36.1* 32.1*   * 114*      Lab Results   Component Value Date/Time     10/04/2022 04:45 AM    K 4.2 10/04/2022 04:45 AM    K 4.0 09/30/2022 10:30 AM     10/04/2022 04:45 AM    CO2 21 10/04/2022 04:45 AM    BUN 16 10/04/2022 04:45 AM    CREATININE 0.7 10/04/2022 04:45 AM    GLUCOSE 279 10/04/2022 04:45 AM    GLUCOSE 119 12/30/2010 04:50 AM    CALCIUM 8.4 10/04/2022 04:45 AM    IONCA 1.25 12/30/2010 09:25 AM         Physical Exam:    BP (!) 147/93   Pulse (!) 115   Temp 97.9 °F (36.6 °C) (Oral)   Resp 12   Ht 6' 1\" (1.854 m)   Wt 167 lb 9.6 oz (76 kg)   SpO2 97%   BMI 22.11 kg/m²       General: Awake, alert. States he is swallowing better today, SLP pending- pt may need barium swallow per speech therapy. Eyes: PERRL, anicteric   Pulmonary: Diminished bibasilar. No wheezes, no accessory muscle use noted   Cardiovascular:  IRR, no heaves or thrills on palpation  Tele: Afib   Abdomen: Soft, nontender, + BS   Extremities: Palpable pulses all extremities, no edema   Neurologic/Psych: A&Ox3, CALHOUN to command with 4/5 strength RUE/RLE; tongue remains deviated to left  Skin: Warm and dry  Incisions: Left neck well approximated      Assessment/Plan: POD #2    1.  Symptomatic left ICA stenosis S/p Left CEA   - Frequent neurovascular checks, monitor incision for signs of swelling/hematoma   - ASA, [place]is for Afib, Pravastatin (LDL goal <70)  - SLP eval, barium swallow pending   - OOBTC, ambulate   - PRN oxycodone for pain management   - BP controlled   -Neurology signed off, follow up in Stroke Clinic        Electronically signed by WILIAM Connelly - CNP on 10/5/2022 at 8:58 AM

## 2022-10-05 NOTE — DISCHARGE INSTRUCTIONS
Carotid Stenosis and Carotid Endarterectomy  Care After Surgery     Refer to this sheet in the next few weeks. These discharge instructions provide you with general information on caring for yourself after you leave the hospital. Your caregiver may also give you specific instructions. Your treatment has been planned according to the most current medical practices available, but unavoidable complications sometimes occur. If you have any problems or questions after discharge, please call your caregiver. HOME CARE INSTRUCTIONS  Ø It is normal to be sore for a couple weeks after surgery. See your caregiver if this seems to be getting worse rather than better. Ø Take showers, not baths, for a few days after surgery or until instructed otherwise by your caregiver. Do not take baths or swim until directed by your surgeon. Ø Only take over-the-counter or prescription medicines for pain, discomfort, or fever as directed by your caregiver. Ø A blood thinner (anticoagulant) may be prescribed after surgery. This medicine should be taken exactly as directed. Ø Change bandages (dressings) as directed by your caregiver. Ø Resume your normal activities as directed by your caregiver. Ø Avoid lifting until you are instructed otherwise. Ø Make an appointment to see your caregiver for stitches (suture) or staple removal when instructed. Ø Stop smoking if you smoke. This is a grave risk factor. Ø Stop taking the pill (oral contraceptives) unless your caregiver recommends otherwise. Ø Maintain good blood pressure control. Ø Exercise regularly or as instructed. Ø Lower blood lipids (cholesterol and triglycerides). Ø Eat a heart-healthy diet. Manage heart problems if they are contributing to your risk. SEEK MEDICAL CARE IF:  Ø There is increased bleeding from the wound. Ø You notice redness, swelling, or increasing pain in the wound. Ø You notice swelling in your neck or have difficulty breathing or talking.   Ø You notice a bad smell or pus coming from the wound or dressing. Ø You have an oral temperature above 102º F (38.9º C). SEEK IMMEDIATE MEDICAL CARE IF:  Ø Your initial symptoms are getting worse rather than better. Ø You develop any abnormal bruising or bleeding. Ø You develop a rash. Ø You have difficulty breathing. Ø You develop any reaction or side effects to medicine given. Ø You develop chest pain, shortness of breath, or pain or swelling in your legs. Ø You have a return of symptoms or problems that caused you to have this surgery. Ø You develop a temporary loss of vision. Ø You develop temporary numbness on one side. Ø You develop a temporary inability to speak (aphasia). Ø You develop temporary areas of weakness. Ø You have problems or concerns that have not been answered. MAKE SURE YOU:  Ø Understand these instructions. Ø Will watch your condition. Ø Will get help right away if you are not doing well or get worse. Document Released: 01/01/2000  Document Re-Released: 03/14/2011  ExitCare® Patient Information ©2011 Manisha Fernandez. Other Instructions:    No driving for 2 weeks. OK to shower. Wash incision daily with soap and water. FOLLOW-UP CARE:  [x]Call the office at [phone] for follow-up appointment in 2 weeks.     WILIAM Blackwood CNP  10/5/2022  2:07 PM

## 2022-10-05 NOTE — PROGRESS NOTES
SPEECH/LANGUAGE PATHOLOGY  VIDEOFLUOROSCOPIC STUDY OF SWALLOWING (MBS)   and PLAN OF CARE    PATIENT NAME:  Aparna Bravo  (male)     MRN:  [id number]    :  1955  (69 y.o.)  STATUS:  Inpatient: Room 3819/3819-A    TODAY'S DATE:  10/5/2022  REFERRING PROVIDER:   Dr. Lanie Laws: [place] modified barium swallow with video  Date of order:  10-5-22   REASON FOR REFERRAL: dysphagia   EVALUATING THERAPIST: CARIN Saldana      RESULTS:      DYSPHAGIA DIAGNOSIS:  mild-moderate pharyngeal phase dysphagia     DIET RECOMMENDATIONS:  Regular consistency solids (IDDSI level 7) with  thin liquids (IDDSI level 0)    FEEDING RECOMMENDATIONS:    Assistance level:  No assistance needed     Compensatory strategies recommended: Double swallow, Effortful swallow, Small bites/sips, Alternate solids and liquids, and No straw     Discussed recommendations with nursing and/or faxed report to referring provider: Yes    SPEECH THERAPY  PLAN OF CARE   The dysphagia POC is established based on physician order and dysphagia diagnosis    Outpatient OR Home Care Skilled SLP intervention for dysphagia management is recommended 1-2 times per week to address the established treatment plan      Conditions Requiring Skilled Therapeutic Intervention for dysphagia:    Patient is performing below functional baseline d/t  current acute condition, Multiple diagnoses, multiple medications, and increased dependency upon caregivers. SPECIFIC DYSPHAGIA INTERVENTIONS TO INCLUDE:     Compensatory strategy training   Therapeutic exercises    Specific instructions for next treatment:  initiate instruction of therapeutic exercises  and initiate instruction of compensatory strategies  Treatment Goals:    Short Term Goals:  Pt will implement identified compensatory swallowing strategies on 90% of opportunities or greater to improve airway protection and swallow function.   Pt will complete BOTR strength/ ROM exercises to reduce pharyngeal residuals and improve epiglottic inversion minimal verbal prompts  Pt will complete Shaker and/or Chin Tuck Against Resistance (CTAR) to increase UES relaxation diameter and increase anterior laryngeal excursion to reduce pharyngeal residuals and reduce risk of pen/asp with minimal verbal prompts. Long Term Goals:   Pt will improve oropharyngeal swallow function to ensure airway protection during PO intake to maintain adequate nutrition/hydration and decrease signs/symptoms of aspiration to less than 1 x/day. Patient/family Goal:    To consume solid foods without feeling like they get stuck    Plan of care discussed with Patient   The Patient understand(s) the diagnosis, prognosis and plan of care     Rehabilitation Potential/Prognosis: good                      ADMITTING DIAGNOSIS: BAILEY weakness [R29.898]     VISIT DIAGNOSIS:   Visit Diagnoses         Codes    Stenosis of left carotid artery     I65.22                PATIENT REPORT/COMPLAINT: food sticking in the throat    PRIOR LEVEL OF SWALLOW FUNCTION:    Past History of Dysphagia?:  none reported    Home diet: Regular consistency solids (IDDSI level 7) with  thin liquids (IDDSI level 0)  Current Diet Order:  ADULT DIET;  Regular; Low Fat/Low Chol/High Fiber/2 gm Na    PROCEDURE:  Consistencies Administered During the Evaluation   Liquids: thin liquid and nectar thick liquid   Solids:  pureed foods and solid foods      Method of Intake:   cup, straw, spoon  Self fed, Fed by clinician      Position:   Seated, upright, Lateral plane    INSTRUMENTAL ASSESSMENT:    ORAL PREP/ ORAL PHASE:    The oral stage of swallowing was within functional limits for consistencies administered     PHARYNGEAL PHASE:     ONSET TIME       Onset time of the pharyngeal swallow was adequate       PHARYNGEAL RESIDUALS        Vallecula/Pharyngeal Wall           Reduced tongue base retraction resulting in residuals in vallecula and/or posterior pharyngeal wall for t nectar consistency liquid, pureed foods, and solid foods which mostly cleared with cued double swallow and liquid chaser       Pyriform Sinuses      Residuals in the pyriform sinuses were noted due to cricopharyngeal dysfunction for nectar consistency liquid, pureed foods, and solid foods  which  mostly cleared with cued double swallow and liquid chaser      LARYNGEAL PENETRATION   Laryngeal penetration occurred in the absence of aspiration DURING the swallow for thin liquid due to  delayed laryngeal closure which remained in the laryngeal vestibule. . Laryngeal penetration was minimal and occurred only with use of a straw   spontaneous throat clearing was noted    ASPIRATION  Aspiration was not present during this evaluation    PENETRATION-ASPIRATION SCALE (PAS):  THIN 3 = Material enters the airway, remains above the vocal folds, and is not ejected from the airway  MILDLY THICK 1 = Material does not enter the airway  MODERATELY THICK item not administered  PUREE 1 = Material does not enter the airway  HARD SOLID 1 = Material does not enter the airway       COMPENSATORY STRATEGIES    Compensatory strategies that were beneficial included Double swallow, Effortful swallow, Small bites/sips, Alternate solids and liquids, and No straw and Compensatory strategies that were not beneficial included Chin tuck      STRUCTURAL/FUNCTIONAL ANOMALIES   No structural/functional anomalies were noted    CERVICAL ESOPHAGEAL STAGE :     The cervical esophagus appeared adequate          ___________    Cognition:   Within functional limits for this exam    Oral Peripheral Examination   Adequate lingual/labial strength     Current Respiratory Status   2 liters nasal cannula     Parameters of Speech Production  Respiration:  Adequate for speech production  Quality:   Breathy  Intensity: Within functional limits    Pain: No pain reported.     EDUCATION:   The Speech Language Pathologist (SLP) completed education regarding results of evaluation and that intervention is warranted at this time. Learner: Patient  Education: Reviewed results and recommendations of this evaluation and Reviewed diet and strategies  Evaluation of Education:  Tyra De Paz understanding    This plan may be re-evaluated and revised as warranted. Evaluation Time includes thorough review of current medical information, gathering information on past medical history/social history and prior level of function, completion of standardized testing/informal observation of tasks, assessment of data and education on plan of care and goals. [x]The admitting diagnosis and active problem list, have been reviewed prior to initiation of this evaluation.     CPT Code: 32038  dysphagia study    ACTIVE PROBLEM LIST:   Patient Active Problem List   Diagnosis    Coronary atherosclerosis of native coronary artery    Hypertension    Hyperlipidemia    Diabetes mellitus (HCC)    Longstanding persistent atrial fibrillation (HCC)    History of echocardiogram    COVID-19    Adrenal adenoma    Testicular hypofunction    Ulcerative colitis (HCC)    Thrombocytopenia (HCC)    Symptomatic stenosis of left carotid artery    History of tobacco use    RUE weakness    TIA (transient ischemic attack)

## 2022-10-05 NOTE — PLAN OF CARE
Problem: Discharge Planning  Goal: Discharge to home or other facility with appropriate resources  10/5/2022 0735 by Chalino Luevano RN  Outcome: Progressing  Flowsheets (Taken 10/5/2022 0735)  Discharge to home or other facility with appropriate resources:   Identify barriers to discharge with patient and caregiver   Arrange for needed discharge resources and transportation as appropriate   Identify discharge learning needs (meds, wound care, etc)   Arrange for interpreters to assist at discharge as needed   Refer to discharge planning if patient needs post-hospital services based on physician order or complex needs related to functional status, cognitive ability or social support system     Problem: Neurosensory - Adult  Goal: Achieves maximal functionality and self care  10/5/2022 0735 by Chalino Luevano RN  Outcome: Progressing  Flowsheets (Taken 10/5/2022 0735)  Achieves maximal functionality and self care:   Monitor swallowing and airway patency with patient fatigue and changes in neurological status   Encourage and assist patient to increase activity and self care with guidance from physical therapy/occupational therapy     Problem: Cardiovascular - Adult  Goal: Maintains optimal cardiac output and hemodynamic stability  10/5/2022 0735 by Chalino Luevano RN  Outcome: Progressing  Flowsheets (Taken 10/5/2022 0735)  Maintains optimal cardiac output and hemodynamic stability:   Monitor blood pressure and heart rate   Monitor urine output and notify Licensed Independent Practitioner for values outside of normal range   Assess for signs of decreased cardiac output   Administer fluid and/or volume expanders as ordered   Administer vasoactive medications as ordered

## 2022-10-05 NOTE — PROGRESS NOTES
Vascular Surgery Progress Note    Pt is being seen in f/u today regarding symptomatic left ICA stenosis and left CEA 10/3    Subjective  Pt s/e. In afib w/ RVR this morning. Still having some tongue weakness. He states his throat is sore and he had some trouble swallowing his eggs yesterday. Current Medications:    dextrose      sodium chloride        oxyCODONE, labetalol, glucose, dextrose bolus **OR** dextrose bolus, glucagon (rDNA), dextrose, perflutren lipid microspheres, sodium chloride flush, sodium chloride, ondansetron **OR** ondansetron, polyethylene glycol, acetaminophen **OR** acetaminophen    apixaban  5 mg Oral BID    mesalamine  1,000 mg Rectal Nightly    doxazosin  2 mg Oral QAM    aspirin  81 mg Oral Daily    balsalazide  2,250 mg Oral TID    insulin glargine-yfgn  26 Units SubCUTAneous QAM    metoprolol succinate  50 mg Oral QAM    pravastatin  20 mg Oral Nightly    sodium chloride flush  5-40 mL IntraVENous 2 times per day    insulin lispro  0-8 Units SubCUTAneous TID WC    insulin lispro  0-4 Units SubCUTAneous Nightly    amLODIPine  10 mg Oral Daily    And    lisinopril  20 mg Oral Daily    doxazosin  4 mg Oral Nightly        PHYSICAL EXAM:    BP (!) 147/93   Pulse (!) 115   Temp 97.9 °F (36.6 °C) (Oral)   Resp 12   Ht 6' 1\" (1.854 m)   Wt 167 lb 9.6 oz (76 kg)   SpO2 97%   BMI 22.11 kg/m²     Intake/Output Summary (Last 24 hours) at 10/5/2022 0758  Last data filed at 10/5/2022 0700  Gross per 24 hour   Intake 1167.19 ml   Output 4825 ml   Net -3657.81 ml          Gen: awake, alert and oriented x3, no apparent distress  Neck: L neck incision C/D/I, no hematoma or drainage  CVS: RRR  Resp: No increased work of breathing on room air  Abd: Soft, non-tender, non-distended  NEURO: CN II-XI intact, minor tongue deviation to left on protrusion but moves freely and easily in all directions.   UEs, LE sensation intact, RUE/RLE 4/5, LUE/LLE 5/5    LABS:    Lab Results   Component Value Date WBC 7.6 10/04/2022    HGB 11.5 (L) 10/04/2022    HCT 32.1 (L) 10/04/2022     (L) 10/04/2022    PROTIME 13.6 (H) 10/03/2022    INR 1.3 10/03/2022    APTT 32.0 09/30/2022    K 4.2 10/04/2022    BUN 16 10/04/2022    CREATININE 0.7 10/04/2022       A/P  69 y.o. male with symptomatic L ICA stenosis s/p L CEA 10/3    - SLP to evaluate this morning  - Afib w/ RVR as per primary  - Pain/nausea control prn  - Goal SBP <160  - Continue eliquis  - OK to transfer to floor  - OK to discharge from vascular surgery POV pending SLP evaluation    Electronically signed by Gaby Machuca MD on 10/5/2022 at 8:02 AM

## 2022-10-05 NOTE — DISCHARGE INSTR - COC
Continuity of Care Form    Patient Name: Chelsi Gaston   :  1955  MRN:  [id number]    Admit date:  2022  Discharge date:  ***    Code Status Order: Full Code   Advance Directives:   Advance Care Flowsheet Documentation       Date/Time Healthcare Directive Type of Healthcare Directive Copy in [address] Agent's Name Healthcare Agent's Phone Number    10/03/22 8528 No, patient does not have an advance directive for healthcare treatment -- -- -- -- --    10/03/22 0155 No, patient does not have an advance directive for healthcare treatment -- -- -- -- --            Admitting Physician:  Estuardo Price DO  PCP: Adriana Mack MD    Discharging Nurse: [facility]/Room#: 7108/5473-A  Discharging Unit Phone Number: ***    Emergency Contact:   Extended Emergency Contact Information  Primary Emergency Contact: Fernanda Chacko  Address: [address] [place] [address] Phone: [phone]  Mobile Phone: [phone]  Relation: Spouse   needed?  No    Past Surgical History:  Past Surgical History:   Procedure Laterality Date    CAROTID ENDARTERECTOMY Left 10/3/2022    CAROTID ENDARTERECTOMY performed by Paolo Santa MD at [address] [place] [address] CATH LAB PROCEDURE      SIGMOIDOSCOPY         Immunization History:   Immunization History   Administered Date(s) Administered    COVID-19, PFIZER PURPLE top, DILUTE for use, (age 15 y+), 30mcg/0.3mL 2021, 2021, 2021    Hepatitis A/Hepatitis B (Twinrix) 2018, 10/16/2018, 2019    Influenza Virus Vaccine 2019    Influenza, High Dose (Fluzone 65 yrs and older) 10/16/2020    Pneumococcal Polysaccharide (Shhorlvtf93) 2019    Td (Adult), 2 Lf Tetanus Toxoid, Pf (Td, Absorbed) 2022    Zoster Recombinant (Shingrix) 2019       Active Problems:  Patient Active Problem List   Diagnosis Code Coronary atherosclerosis of native coronary artery I25.10    Hypertension I10    Hyperlipidemia E78.5    Diabetes mellitus (HCC) E11.9    Longstanding persistent atrial fibrillation (HCC) I48.11    History of echocardiogram Z92.89    COVID-19 U07.1    Adrenal adenoma D35.00    Testicular hypofunction E29.1    Ulcerative colitis (HCC) K51.90    Thrombocytopenia (HCC) D69.6    Symptomatic stenosis of left carotid artery I65.22    History of tobacco use Z87.891    RUE weakness R29.898    TIA (transient ischemic attack) G45.9       Isolation/Infection:   Isolation            No Isolation          Patient Infection Status       Infection Onset Added Last Indicated Last Indicated By Review Planned Expiration Resolved Resolved By    None active    Resolved    COVID-19 21 COVID-19   21     COVID-19 (Rule Out) 21 COVID-19 (Ordered)   21 Rule-Out Test Resulted            Nurse Assessment:  Last Vital Signs: /80   Pulse 87   Temp 97.7 °F (36.5 °C) (Oral)   Resp 13   Ht 6' 1\" (1.854 m)   Wt 167 lb 9.6 oz (76 kg)   SpO2 95%   BMI 22.11 kg/m²     Last documented pain score (0-10 scale): Pain Level: 0  Last Weight:   Wt Readings from Last 1 Encounters:   22 167 lb 9.6 oz (76 kg)     Mental Status:  {IP PT MENTAL STATUS:35374}    IV Access:  { LATONIA IV ACCESS:577983621}    Nursing Mobility/ADLs:  Walking   {[facility] DME QAXF:026540370}  Transfer  {P DME XZAR:990620589}  Bathing  {P DME JJTE:671826515}  Dressing  {[facility] DME PABR:247187710}  Toileting  {[facility] DME OGS}  Feeding  {[facility] DME ECMN:874075862}  Med Admin  {P DME TWRQ:914545932}  Med Delivery   { LATONIA MED Delivery:938806366}    Wound Care Documentation and Therapy:  Incision 10/03/22 Neck Left;Lateral (Active)   Dressing Status Clean;Dry; Intact 10/05/22 1200   Dressing/Treatment Skin glue 10/05/22 1200   Closure Open to air 10/05/22 1200   Incision Assessment Dry 10/05/22 1200 Number of days: 2        Elimination:  Continence: Bowel: {YES / PA:61561}  Bladder: {YES / SO:61990}  Urinary Catheter: {Urinary Catheter:916735865}   Colostomy/Ileostomy/Ileal Conduit: {YES / ES:17738}       Date of Last BM: ***    Intake/Output Summary (Last 24 hours) at 10/5/2022 1343  Last data filed at 10/5/2022 1200  Gross per 24 hour   Intake 120 ml   Output 3800 ml   Net -3680 ml     I/O last 3 completed shifts: In: 2542.9 [P.O.:855;  I.V.:1687.9]  Out: 2153 [Urine:6395]    Safety Concerns:     [address] Safety Concerns:094610732}    Impairments/Disabilities:      {[facility] Impairments/Disabilities:656835256}    Nutrition Therapy:  Current Nutrition Therapy:   [address] RABT Diet List:788887123}    Routes of Feeding: {[facility] DME Other Feedings:661428371}  Liquids: {Slp liquid thickness:74364}  Daily Fluid Restriction: {P DME Yes amt example:406903980}  Last Modified Barium Swallow with Video (Video Swallowing Test): {Done Not Done CSJR:578554153}    Treatments at the Time of Hospital Discharge:   Respiratory Treatments: ***  Oxygen Therapy:  {Therapy; copd oxygen:71715}  Ventilator:    { CC Vent UIDE:643281612}    Rehab Therapies: {THERAPEUTIC INTERVENTION:1952380597}  Weight Bearing Status/Restrictions: [address] Viral  Weight Bearin}  Other Medical Equipment (for information only, NOT a DME order):  {EQUIPMENT:812671950}  Other Treatments: ***    Patient's personal belongings (please select all that are sent with patient):  {[facility] DME Belongings:380245387}    RN SIGNATURE:  {Esignature:800129947}    CASE MANAGEMENT/SOCIAL WORK SECTION    Inpatient Status Date: ***    Readmission Risk Assessment Score:  Readmission Risk              Risk of Unplanned Readmission:  18           Discharging to Facility/ Agency   Name:   Address:  Phone:  Fax:    Dialysis Facility (if applicable)   Name:  Address:  Dialysis Schedule:  Phone:  Fax:    / signature: {Esignature:491039940}    PHYSICIAN SECTION    Prognosis: {Prognosis:1305509940}    Condition at Discharge: Kayy Stratton Patient Condition:004291163}    Rehab Potential (if transferring to Rehab): {Prognosis:4883464429}    Recommended Labs or Other Treatments After Discharge: ***    Physician Certification: I certify the above information and transfer of Chelsi Gaston  is necessary for the continuing treatment of the diagnosis listed and that he requires {Admit to Appropriate Level of Care:16431} for {GREATER/LESS:229678096} 30 days.      Update Admission H&P: {CHP DME Changes in ZGPDR:928867501}    PHYSICIAN SIGNATURE:  {Esignature:017782764}

## 2022-10-05 NOTE — PLAN OF CARE
Problem: Chronic Conditions and Co-morbidities  Goal: Patient's chronic conditions and co-morbidity symptoms are monitored and maintained or improved  10/5/2022 1432 by Isa Lombardo RN  Outcome: Completed  10/5/2022 0225 by Cindy Greene RN  Outcome: Progressing     Problem: Discharge Planning  Goal: Discharge to home or other facility with appropriate resources  10/5/2022 1432 by Isa Lombardo RN  Outcome: Completed  10/5/2022 0735 by Ольга Canseco RN  Outcome: Progressing  Flowsheets (Taken 10/5/2022 0735)  Discharge to home or other facility with appropriate resources:   Identify barriers to discharge with patient and caregiver   Arrange for needed discharge resources and transportation as appropriate   Identify discharge learning needs (meds, wound care, etc)   Arrange for interpreters to assist at discharge as needed   Refer to discharge planning if patient needs post-hospital services based on physician order or complex needs related to functional status, cognitive ability or social support system  10/5/2022 0225 by Cindy Greene RN  Outcome: Progressing     Problem: Neurosensory - Adult  Goal: Achieves stable or improved neurological status  10/5/2022 1432 by Isa Lombardo RN  Outcome: Completed  10/5/2022 0225 by Cindy Greene RN  Flowsheets (Taken 10/5/2022 0225)  Achieves stable or improved neurological status: Maintain blood pressure and fluid volume within ordered parameters to optimize cerebral perfusion and minimize risk of hemorrhage  Goal: Absence of seizures  10/5/2022 1432 by Isa Lombardo RN  Outcome: Completed  10/5/2022 0225 by Cindy Greene RN  Outcome: Progressing  Goal: Remains free of injury related to seizures activity  10/5/2022 1432 by Isa Lombardo RN  Outcome: Completed  10/5/2022 0225 by Cindy Greene RN  Outcome: Progressing  Goal: Achieves maximal functionality and self care  10/5/2022 1432 by Isa Lombardo RN  Outcome: Completed  10/5/2022 0735 by Angelina Bains MICHAEL Hoffman  Outcome: Progressing  Flowsheets (Taken 10/5/2022 0735)  Achieves maximal functionality and self care:   Monitor swallowing and airway patency with patient fatigue and changes in neurological status   Encourage and assist patient to increase activity and self care with guidance from physical therapy/occupational therapy  10/5/2022 0225 by Smamie Mckay RN  Outcome: Progressing     Problem: Cardiovascular - Adult  Goal: Maintains optimal cardiac output and hemodynamic stability  10/5/2022 1432 by Natalie Canales RN  Outcome: Completed  10/5/2022 0735 by Christiano Wetzel RN  Outcome: Progressing  Flowsheets (Taken 10/5/2022 0735)  Maintains optimal cardiac output and hemodynamic stability:   Monitor blood pressure and heart rate   Monitor urine output and notify Licensed Independent Practitioner for values outside of normal range   Assess for signs of decreased cardiac output   Administer fluid and/or volume expanders as ordered   Administer vasoactive medications as ordered  10/5/2022 0225 by Sammie Mckay RN  Outcome: Progressing  Goal: Absence of cardiac dysrhythmias or at baseline  10/5/2022 1432 by Natalie Canales RN  Outcome: Completed  10/5/2022 0225 by Sammie Mckay RN  Outcome: Progressing     Problem: Pain  Goal: Verbalizes/displays adequate comfort level or baseline comfort level  Outcome: Completed

## 2022-10-05 NOTE — PLAN OF CARE
Problem: Chronic Conditions and Co-morbidities  Goal: Patient's chronic conditions and co-morbidity symptoms are monitored and maintained or improved  Outcome: Progressing     Problem: Discharge Planning  Goal: Discharge to home or other facility with appropriate resources  Outcome: Progressing     Problem: Neurosensory - Adult  Goal: Absence of seizures  Outcome: Progressing  Goal: Remains free of injury related to seizures activity  Outcome: Progressing  Goal: Achieves maximal functionality and self care  Outcome: Progressing     Problem: Cardiovascular - Adult  Goal: Maintains optimal cardiac output and hemodynamic stability  Outcome: Progressing  Goal: Absence of cardiac dysrhythmias or at baseline  Outcome: Progressing

## 2022-10-05 NOTE — PROGRESS NOTES
SLP unable to complete evaluation due to patient's complaints of numbness in his tongue and pain in the jaw. Video Swallow needed. Dr. Delia Flores answering service called regarding need for an order for a video swallow. Telephone order received for video swallow, see orders.

## 2022-10-05 NOTE — PROGRESS NOTES
Hospital Medicine    Subjective:  pt seen this am c/o difficulty swallowing      Current Facility-Administered Medications:     oxyCODONE (ROXICODONE) immediate release tablet 5 mg, 5 mg, Oral, Q4H PRN, Nazia Gutierres MD    apixaban Cristina Geoffrey) tablet 5 mg, 5 mg, Oral, BID, Nazia Gutierres MD, 5 mg at 10/05/22 3974    labetalol (NORMODYNE;TRANDATE) injection 10 mg, 10 mg, IntraVENous, Q4H PRN, WILIAM Guerrero - CNP, 10 mg at 10/03/22 1820    glucose chewable tablet 16 g, 4 tablet, Oral, PRN, Nazia Gutierres MD    dextrose bolus 10% 125 mL, 125 mL, IntraVENous, PRN **OR** dextrose bolus 10% 250 mL, 250 mL, IntraVENous, PRN, Nazia Gutierres MD    glucagon (rDNA) injection 1 mg, 1 mg, SubCUTAneous, PRN, Nazia Gutierres MD    dextrose 10 % infusion, , IntraVENous, Continuous PRN, Nazia Gutierres MD    mesalamine (CANASA) suppository 1,000 mg  (Patient Supplied), 1,000 mg, Rectal, Nightly, Nazia Gutierres MD, 1,000 mg at 10/04/22 2006    doxazosin (CARDURA) tablet 2 mg, 2 mg, Oral, QAM, Nazia Gutierres MD, 2 mg at 10/05/22 6558    perflutren lipid microspheres (DEFINITY) injection 1.65 mg, 1.5 mL, IntraVENous, ONCE PRN, Nazia Gutierres MD    aspirin EC tablet 81 mg, 81 mg, Oral, Daily, Nazia Gutierres MD, 81 mg at 10/05/22 9971    balsalazide (COLAZAL) capsule 2,250 mg  (Patient Supplied), 2,250 mg, Oral, TID, Nazia Gutierres MD, 2,250 mg at 10/05/22 0906    insulin glargine-yfgn (SEMGLEE-YFGN) injection vial 26 Units, 26 Units, SubCUTAneous, QAM, Nazia Gutierres MD, 26 Units at 10/05/22 0904    metoprolol succinate (TOPROL XL) extended release tablet 50 mg, 50 mg, Oral, QAM, Kathy Chavez MD, 50 mg at 10/05/22 0905    pravastatin (PRAVACHOL) tablet 20 mg, 20 mg, Oral, Nightly, Nazia Gutierres MD, 20 mg at 10/04/22 2004    sodium chloride flush 0.9 % injection 5-40 mL, 5-40 mL, IntraVENous, 2 times per day, Nazia Gutierres MD, 10 mL at 10/05/22 0903    sodium chloride flush 0.9 % injection 5-40 mL, 5-40 mL, IntraVENous, PRN, Freya Nixon MD, 10 mL at 10/03/22 1746    0.9 % sodium chloride infusion, , IntraVENous, PRN, Freya Nixon MD    ondansetron (ZOFRAN-ODT) disintegrating tablet 4 mg, 4 mg, Oral, Q8H PRN **OR** ondansetron (ZOFRAN) injection 4 mg, 4 mg, IntraVENous, Q6H PRN, Freya Nixon MD    polyethylene glycol (GLYCOLAX) packet 17 g, 17 g, Oral, Daily PRN, Freya Nixon MD    acetaminophen (TYLENOL) tablet 650 mg, 650 mg, Oral, Q6H PRN **OR** acetaminophen (TYLENOL) suppository 650 mg, 650 mg, Rectal, Q6H PRN, Freya Nixon MD    insulin lispro (HUMALOG) injection vial 0-8 Units, 0-8 Units, SubCUTAneous, TID WC, Freya Nixon MD, 2 Units at 10/05/22 1204    insulin lispro (HUMALOG) injection vial 0-4 Units, 0-4 Units, SubCUTAneous, Nightly, Freya Nixon MD    amLODIPine (NORVASC) tablet 10 mg, 10 mg, Oral, Daily, 10 mg at 10/05/22 0903 **AND** lisinopril (PRINIVIL;ZESTRIL) tablet 20 mg, 20 mg, Oral, Daily, Freya Nixon MD, 20 mg at 10/05/22 8822    doxazosin (CARDURA) tablet 4 mg, 4 mg, Oral, Nightly, Freya Nixon MD, 4 mg at 10/04/22 2005    Objective:    /76   Pulse 91   Temp 97.7 °F (36.5 °C) (Oral)   Resp 12   Ht 6' 1\" (1.854 m)   Wt 167 lb 9.6 oz (76 kg)   SpO2 99%   BMI 22.11 kg/m²     Heart:  irreg  Lungs:  ctab  Abd: + bs soft nontender  Extrem:  w/o edema    CBC with Differential:    Lab Results   Component Value Date/Time    WBC 7.6 10/04/2022 04:45 AM    RBC 3.87 10/04/2022 04:45 AM    HGB 11.5 10/04/2022 04:45 AM    HCT 32.1 10/04/2022 04:45 AM     10/04/2022 04:45 AM    MCV 82.9 10/04/2022 04:45 AM    MCH 29.7 10/04/2022 04:45 AM    MCHC 35.8 10/04/2022 04:45 AM    RDW 13.0 10/04/2022 04:45 AM    NRBC 0.0 02/07/2021 06:00 AM    LYMPHOPCT 11.6 09/30/2022 10:30 AM    MONOPCT 5.9 09/30/2022 10:30 AM    BASOPCT 0.5 09/30/2022 10:30 AM    MONOSABS 0.36 09/30/2022 10:30 AM    LYMPHSABS 0.71 09/30/2022 10:30 AM EOSABS 0.11 09/30/2022 10:30 AM    BASOSABS 0.03 09/30/2022 10:30 AM     CMP:    Lab Results   Component Value Date/Time     10/04/2022 04:45 AM    K 4.2 10/04/2022 04:45 AM    K 4.0 09/30/2022 10:30 AM     10/04/2022 04:45 AM    CO2 21 10/04/2022 04:45 AM    BUN 16 10/04/2022 04:45 AM    CREATININE 0.7 10/04/2022 04:45 AM    GFRAA >60 10/04/2022 04:45 AM    LABGLOM >60 10/04/2022 04:45 AM    GLUCOSE 279 10/04/2022 04:45 AM    GLUCOSE 119 12/30/2010 04:50 AM    PROT 6.8 09/30/2022 10:30 AM    LABALBU 4.5 09/30/2022 10:30 AM    CALCIUM 8.4 10/04/2022 04:45 AM    BILITOT 0.6 09/30/2022 10:30 AM    ALKPHOS 90 09/30/2022 10:30 AM    AST 17 09/30/2022 10:30 AM    ALT <5 09/30/2022 10:30 AM     Warfarin PT/INR:    Lab Results   Component Value Date    INR 1.3 10/03/2022    INR 1.2 09/30/2022    INR 1.4 02/11/2021    PROTIME 13.6 (H) 10/03/2022    PROTIME 13.4 (H) 09/30/2022    PROTIME 16.3 (H) 02/11/2021       Assessment:    Principal Problem:    RUE weakness  Active Problems:    Symptomatic stenosis of left carotid artery    TIA (transient ischemic attack)  Resolved Problems:    * No resolved hospital problems.  *      Plan:  Swallow eval dc planning home when ok with vascular        Zachery Vasquez DO  12:40 PM  10/5/2022

## 2022-10-06 ENCOUNTER — OFFICE VISIT (OUTPATIENT)
Dept: CARDIOLOGY CLINIC | Age: 67
End: 2022-10-06
Payer: MEDICARE

## 2022-10-06 VITALS
DIASTOLIC BLOOD PRESSURE: 40 MMHG | SYSTOLIC BLOOD PRESSURE: 61 MMHG | HEIGHT: 73 IN | BODY MASS INDEX: 22 KG/M2 | HEART RATE: 100 BPM | RESPIRATION RATE: 14 BRPM | WEIGHT: 166 LBS

## 2022-10-06 DIAGNOSIS — I25.10 ATHEROSCLEROSIS OF NATIVE CORONARY ARTERY OF NATIVE HEART WITHOUT ANGINA PECTORIS: ICD-10-CM

## 2022-10-06 DIAGNOSIS — I48.11 LONGSTANDING PERSISTENT ATRIAL FIBRILLATION (HCC): Primary | ICD-10-CM

## 2022-10-06 PROCEDURE — 99214 OFFICE O/P EST MOD 30 MIN: CPT | Performed by: INTERNAL MEDICINE

## 2022-10-06 PROCEDURE — 1123F ACP DISCUSS/DSCN MKR DOCD: CPT | Performed by: INTERNAL MEDICINE

## 2022-10-06 PROCEDURE — 93000 ELECTROCARDIOGRAM COMPLETE: CPT | Performed by: INTERNAL MEDICINE

## 2022-10-06 NOTE — PROGRESS NOTES
Chief Complaint   Patient presents with    Atrial Fibrillation    Coronary Artery Disease       Patient Active Problem List    Diagnosis Date Noted    TIA (transient ischemic attack) 10/01/2022    RUE weakness 09/30/2022    Symptomatic stenosis of left carotid artery 05/19/2022     Overview Note:     70% plus stenosis based upon my personal interpretation      History of tobacco use 05/19/2022    Thrombocytopenia ([facility]     COVID-19 02/06/2021    History of echocardiogram 10/01/2020     Overview Note:     A. Echo 8/17/2020: EF 55%, mild LVH, mild MR      Longstanding persistent [facility] 07/16/2020     Overview Note:     A. CHADS-VASC=4      Coronary atherosclerosis of native coronary artery 03/07/2012     Overview Note:       Cardiac catheterization, 4/15/05. Moderate coronary atherosclerosis in RCA, circumflex and LAD arteries. Normal LV systolic function. LVEF 65%. Exercise Myoview 4/21/08.  13 METS, no chest pain, normal ECG, normal scan. Hypertension     Hyperlipidemia      Overview Note:       Possible LFT abnormality with Lipitor.  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    Ulcerative colitis ([facility] 06/17/2011     Overview Note:     Last Assessment & Plan:   Continue with medical management  Appreciate ongoing GI input  No surgical indication at this time         Current Outpatient Medications   Medication Sig Dispense Refill    Zinc Gluconate 10 MG LOZG Take 1 lozenge by mouth daily      acetaminophen (TYLENOL) 325 MG tablet Take 2 tablets by mouth every 6 hours as needed for Pain 120 tablet 3    aspirin 81 MG EC tablet Take 1 tablet by mouth daily 30 tablet 3    mesalamine (CANASA) 1000 MG suppository Place 1 suppository rectally nightly 60 suppository 3    apixaban (ELIQUIS) 5 MG TABS tablet Take 5 mg by mouth 2 times daily      Cholecalciferol (VITAMIN D3) 50 MCG (2000 UT) TABS Take 2,000-6,000 Units by GIB for some time on Entyvio. Undergoing Entivio injections per [facility] adjusting meds. Hayley Omer [facility] for UC and dr Adryan Swan for renal issues/htn. Physical Exam   BP (!) 61/40   Pulse 100   Resp 14   Ht 6' 1\" (1.854 m)   Wt 166 lb (75.3 kg)   BMI 21.90 kg/m²   Constitutional: Oriented to person, place, and time. Thin, sickly appearing No distress. Head: Normocephalic and atraumatic. Eyes: EOM are normal. Pupils are equal, round, and reactive to light. Neck: Normal range of motion. Neck supple. No hepatojugular reflux and no JVD present. Carotid bruit is not present. Cardiovascular: Normal rate, irregular rhythm, normal heart sounds and intact distal pulses. Exam reveals no gallop and no friction rub. No murmur heard. Pulmonary/Chest: Effort normal and breath sounds normal. No respiratory distress. No wheezes. No rales. Abdominal: Soft. Bowel sounds are normal. No distension and no mass. Musculoskeletal: Normal range of motion. No edema  Neurological: Alert and oriented to person, place, and time. Skin: Skin is warm and dry. No rash noted. Not diaphoretic. No erythema. Psychiatric: Normal mood and affect.  Behavior is normal.     EKG:  Atrial fibrillation with CVR, otherwise normal     ASSESSMENT AND PLAN:  Patient Active Problem List   Diagnosis    Coronary atherosclerosis of native coronary artery    Hypertension    Hyperlipidemia    Diabetes mellitus ([facility]    Long standing persistent atrial fibrillation     Patient with permanent, asymptomatic atrial fibrillation with CVR -  non valvular  Elevated CHADS-VASC score - no recent major GIBs - on NOAC  No sign of chf or new ischemic changes on ekg  Symptomatic low BP - will discontinue HCTZ, hold PM meds today, hydrate, further input per dr Homa Fuchs 6 months      Gonzalo Augustine M.D  [facility]

## 2022-10-18 ENCOUNTER — OFFICE VISIT (OUTPATIENT)
Dept: VASCULAR SURGERY | Age: 67
End: 2022-10-18

## 2022-10-18 DIAGNOSIS — I65.23 BILATERAL CAROTID ARTERY STENOSIS: Primary | ICD-10-CM

## 2022-10-18 PROCEDURE — 99024 POSTOP FOLLOW-UP VISIT: CPT | Performed by: SURGERY

## 2022-10-18 RX ORDER — MULTIVIT-MIN/IRON/FOLIC ACID/K 18-600-40
CAPSULE ORAL
COMMUNITY

## 2022-10-18 NOTE — PROGRESS NOTES
Vascular Surgery Outpatient Progress Note      Chief Complaint   Patient presents with    Post-Op Check     S?p (L) CEA, still having trouble swallowing and problems with speaking. HISTORY OF PRESENT ILLNESS:                The patient is a 69 y.o. male who returns for follow-up evaluation of L CEA performed 10/3. Pt says the only residual symptoms from TIA and subsequent L CEA are some dysphagia to solids and some minor left sided tongue deviation. Able to tolerate PO intake to liquids, and soft solids, but chokes w bigger meals meats etc.      Past Medical History:        Diagnosis Date    SHIVAM (cerebral atherosclerosis)     Coronary atherosclerosis of native coronary artery     COVID     Diabetes mellitus ([facility]     Dizziness     History of tobacco use 5/19/2022    Hyperlipidemia     Hypertension     Left carotid stenosis 5/19/2022    Ulcerative colitis [facility])      Past Surgical History:        Procedure Laterality Date    CAROTID ENDARTERECTOMY Left 10/3/2022    CAROTID ENDARTERECTOMY performed by Amy Dubois MD at [address] [place] [address] CATH LAB PROCEDURE      SIGMOIDOSCOPY       Current Medications:   Prior to Admission medications    Medication Sig Start Date End Date Taking?  Authorizing Provider   Ascorbic Acid (VITAMIN C) 500 MG CAPS Take by mouth   Yes Historical Provider, MD   Zinc Gluconate 10 MG LOZG Take 1 lozenge by mouth daily   Yes Historical Provider, MD   acetaminophen (TYLENOL) 325 MG tablet Take 2 tablets by mouth every 6 hours as needed for Pain 10/5/22  Yes Nikki Mar DO   aspirin 81 MG EC tablet Take 1 tablet by mouth daily 10/6/22  Yes Nikki Mar,    mesalamine (CANASA) 1000 MG suppository Place 1 suppository rectally nightly 10/5/22  Yes Wilfredo Breen, DO   apixaban (ELIQUIS) 5 MG TABS tablet Take 5 mg by mouth 2 times daily   Yes Historical Provider, MD   Cholecalciferol (VITAMIN D3) 50 MCG (2000 UT) TABS Take 2,000-6,000 Units by mouth daily   Yes Historical Provider, MD   potassium chloride (KLOR-CON M) 20 MEQ extended release tablet Take 60 mEq by mouth daily   Yes Historical Provider, MD   insulin lispro (HUMALOG) 100 UNIT/ML injection vial Inject into the skin 3 times daily (before meals) PER SLIDING SCALE   Yes Historical Provider, MD   Probiotic Product (PROBIOTIC DAILY) CAPS Take 1 capsule by mouth nightly   Yes Historical Provider, MD   vedolizumab (ENTYVIO) 300 MG injection Infuse 300 mg intravenously every 28 days GIVEN AT [facility] 8/25/21  Yes Historical Provider, MD   Turmeric 500 MG CAPS Take 500 mg by mouth 2 times daily   Yes Historical Provider, MD   metoprolol succinate (TOPROL XL) 50 MG extended release tablet Take 50 mg by mouth every morning 4/7/17  Yes Historical Provider, MD   CIALIS 20 MG tablet Take 20 mg by mouth as needed for Erectile Dysfunction 3/2/17  Yes Historical Provider, MD   amLODIPine-benazepril (LOTREL) 10-20 MG per capsule Take 1 capsule by mouth every morning   Yes Historical Provider, MD   terazosin (HYTRIN) 5 MG capsule Take 5 mg by mouth nightly    Yes Historical Provider, MD   pravastatin (PRAVACHOL) 20 MG tablet Take 20 mg by mouth nightly    Yes Historical Provider, MD   balsalazide (COLAZAL) 750 MG capsule Take 2,250 mg by mouth 3 times daily   Yes Historical Provider, MD   Testosterone 20.25 MG/1.25GM (1.62%) GEL Place 1 Package onto the skin daily   Yes Historical Provider, MD   insulin glargine (LANTUS) 100 UNIT/ML injection Inject 26 Units into the skin every morning   Yes Historical Provider, MD   terazosin (HYTRIN) 2 MG capsule Take 2 mg by mouth every morning **SEE OTHER ORDER**  Patient not taking: Reported on 10/18/2022    Historical Provider, MD   hydrochlorothiazide (HYDRODIURIL) 25 MG tablet Take 25 mg by mouth every morning  Patient not taking: Reported on 10/18/2022    Historical Provider, MD     Allergies:  Sulfa antibiotics    Social History     Socioeconomic History    Marital status:      Spouse name: Not on file    Number of children: Not on file    Years of education: Not on file    Highest education level: Not on file   Occupational History    Not on file   Tobacco Use    Smoking status: Former     Packs/day: 1.00     Types: Cigarettes     Start date: 1972     Quit date: 1982     Years since quittin.8    Smokeless tobacco: Never   Vaping Use    Vaping Use: Never used   Substance and Sexual Activity    Alcohol use: Yes     Comment: occasionally    Drug use: No    Sexual activity: Never   Other Topics Concern    Not on file   Social History Narrative    Not on file     Social Determinants of Health     Financial Resource Strain: Not on file   Food Insecurity: Not on file   Transportation Needs: Not on file   Physical Activity: Not on file   Stress: Not on file   Social Connections: Not on file   Intimate Partner Violence: Not on file   Housing Stability: Not on file        Family History   Problem Relation Age of Onset    Cancer Mother        REVIEW OF SYSTEMS (New symptoms):    Eyes:      Blurred vision:  No [x]/Yes []               Diplopia:   No [x]/Yes []               Vision loss:       No [x]/Yes []   Ears, nose, throat:             Hearing loss:    No [x]/Yes []      Vertigo:   No [x]/Yes []                       Swallowing problem:  No [x]/Yes []               Nose bleeds:   No [x]/Yes []      Voice hoarseness:  No [x]/Yes []  Respiratory:             Cough:   No [x]/Yes []      Pleuritic chest pain:  No [x]/Yes []                        Dyspnea:   No [x]/Yes []      Wheezing:   No [x]/Yes []  Cardiovascular:             Angina:   No [x]/Yes []      Palpitations:   No [x]/Yes []          Claudication:    No [x]/Yes []      Leg swelling:   No [x]/Yes []  Gastrointestinal:             Nausea or vomiting:  No [x]/Yes []               Abdominal pain:  No [x]/Yes []                     Intestinal bleeding: No [x]/Yes []  Musculoskeletal:             Leg pain:   No [x]/Yes []      Back pain:   No [x]/Yes []                    Weakness:   No [x]/Yes []  Neurologic:             Numbness:   No [x]/Yes []      Paralysis:   No [x]/Yes []                       Headaches:   No [x]/Yes []  Hematologic, lymphatic:   Anemia:   No [x]/Yes []              Bleeding or bruising:  No [x]/Yes []              Fevers or chills: No [x]/Yes []  Endocrine:             Temp intolerance:   No [x]/Yes []                       Polydipsia, polyuria:  No [x]/Yes []  Skin:              Rash:    No [x]/Yes []      Ulcers:   No [x]/Yes []              Abnorm pigment: No [x]/Yes []  :              Frequency/urgency:  No [x]/Yes []      Hematuria:    No [x]/Yes []                      Incontinence:    No [x]/Yes []    PHYSICAL EXAM:  There were no vitals filed for this visit. General Appearance: alert and oriented to person, place and time, in no acute distress, well developed and well- nourished  Neurologic: no cranial nerve deficit, speech normal, CN II-XII intact, w minor left sided tongue deviation  Head: normocephalic and atraumatic  Eyes: extraocular eye movements intact, conjunctivae normal  ENT: external ear and ear canal normal bilaterally, nose without deformity. L neck incision C.D.I., no hematoma or ecchymosis noted. Pulmonary/Chest: normal air movement, no respiratory distress  Cardiovascular: normal rate, regular rhythm, no murmur  Abdomen: non-distended, no masses  Musculoskeletal: no joint deformity or tenderness.  5/5 motor UE's and LE's, sensation intact everywhere except minor sensory deficit to soft touch on left neck  Extremities: no leg edema bilaterally  Skin: warm and dry, no rash or erythema    PULSE EXAM      Right      Left   Brachial     Radial     Femoral     Popliteal     Dorsalis Pedis     Posterior Tibial     (3=normal, 2=diminished, 1=barely palpable, 4=widened)    RADIOLOGY: [facility] today    Problem List Items Addressed This Visit    None  Visit Diagnoses       Bilateral carotid artery stenosis    -  Primary    Relevant Orders    US CAROTID ARTERY BILATERAL        Pt seen as 2wk f/u from symptomatic L ICA stenosis s/p LCEA on 10/3. Discussed w patient that tongue and swallowing issues should gradually improve over the coming months. Patient currently w no restrictions related to driving, physical activity, eating, airline travel etc.    Follow up w repeat carotid US in 6 months. No follow-ups on file. Agree. I reviewed with the patient that the swallowing should continue to improve. I encouraged him to remain hydrated especially with his history of colitis. I asked him to call if he is unable to maintain his weight. I feel that it is unlikely that he would require a feeding tube at this point. I will plan to see him again in 6 months but asked him to call sooner with any worsening symptoms or other problems.

## 2022-11-10 NOTE — DISCHARGE SUMMARY
[address]                  [address] [facility],  [facility]                               DISCHARGE SUMMARY    PATIENT NAME: Braulio Huber                      :        1955  MED REC NO:   [id number]                            ROOM:       3819  ACCOUNT NO:   [de-identified]                           ADMIT DATE: 2022  PROVIDER:     Simon Johnson DO                  DISCHARGE DATE:  10/05/2022    DISCHARGE DIAGNOSES:  1. Acute CVA. 2.  Left carotid stenosis. 3.  Atrial fibrillation. 4.  Ulcerative colitis. 5.  Hypertension. 6.  Diabetes mellitus. 7.  Elevated cholesterol. 8.  Chronic anticoagulation. HISTORY OF PRESENT ILLNESS AND HOSPITAL COURSE:  The patient is a  67-year-old  male who presented to the emergency room with  acute onset of altered mental status and right arm weakness. Diagnostic  evaluation revealed 50% stenosis right internal carotid, 90% stenosis  left internal carotid, severe stenosis right vertebral artery. He was  admitted to the hospital.  He was seen by Neurology, Cardiology,  Vascular and IR. He underwent surgery on 10/03/2022, left carotid  endarterectomy. Postop course was unremarkable. He was discharged to  home in stable condition on 10/05/2022. DISCHARGE MEDICATIONS:  Discharge meds as per discharge med rec which  include;  1. Tylenol p.r.n.  2.  Aspirin. 3.  Canasa rectal as directed. 4.  Lotrel. 5.  Eliquis. 6.  Colazal.  7.  Cialis p.r.n.  8.  HydroDIURIL. 9.  Lantus. 10  Humalog. 11. Toprol XL.  12.  Potassium chloride. 13.  Pravachol. 14.  Probiotic. 15.  Hytrin. 16.  Testosterone gel. 17.  Turmeric. 18.  Entyvio. 19.  Vitamin D.    DISCHARGE INSTRUCTIONS:  The patient instructed to follow up with Dr. Kirstin Julian, call office for appointment.         Laura Ballesteros DO    D: 2022 16:57:21       T: 2022 16:59:36     [email or web address]  Job#: [id number]     Doc#: [id number]    CC:

## 2022-11-16 ENCOUNTER — TELEPHONE (OUTPATIENT)
Dept: VASCULAR SURGERY | Age: 67
End: 2022-11-16

## 2022-11-16 NOTE — TELEPHONE ENCOUNTER
Patient states he is still having swelling of his tongue and neck. This is getting better but is a slow process. Do you recommend therapy at this time ( and what type)?

## 2022-11-28 RX ORDER — APIXABAN 5 MG/1
TABLET, FILM COATED ORAL
Qty: 180 TABLET | Refills: 3 | Status: SHIPPED | OUTPATIENT
Start: 2022-11-28

## 2022-12-06 ENCOUNTER — OFFICE VISIT (OUTPATIENT)
Dept: VASCULAR SURGERY | Age: 67
End: 2022-12-06

## 2022-12-06 VITALS — BODY MASS INDEX: 22.8 KG/M2 | WEIGHT: 172 LBS | HEIGHT: 73 IN

## 2022-12-06 DIAGNOSIS — R13.10 DYSPHAGIA, UNSPECIFIED TYPE: ICD-10-CM

## 2022-12-06 DIAGNOSIS — I65.22 LEFT CAROTID STENOSIS: Primary | ICD-10-CM

## 2022-12-06 PROCEDURE — 99024 POSTOP FOLLOW-UP VISIT: CPT | Performed by: PHYSICIAN ASSISTANT

## 2022-12-06 NOTE — PROGRESS NOTES
12/6/2022    Bowen Andrews  1955    Chief Complaint   Patient presents with    Check-Up     Trouble swallowing       Patient returns for post operative evaluation status post left carotid endarterectomy. He continues to have divya-incisional numbness but it is improving. He still has difficulty swallowing specific foods such as lettuce and cheese. He has no difficulty in swallowing liquids. He has been drinking protein smoothies to keep his weight adequate. Procedure Laterality Date    CAROTID ENDARTERECTOMY Left 10/3/2022    CAROTID ENDARTERECTOMY performed by Diane Brewster MD at [address] [place] [address] CATH LAB PROCEDURE      SIGMOIDOSCOPY         Physical Exam:  The neck incision is healing without evidence of infection. + tongue deviation to the L. Heart rhythm is regular. Radial pulse are appreciated bilaterally. Assessment:  Post-operative carotid endarterectomy. Problem List Items Addressed This Visit          Circulatory    Left carotid stenosis - Primary    Relevant Orders    External Referral To Speech Therapy       Digestive    Dysphagia    Relevant Orders    External Referral To Speech Therapy       I reviewed with the patient that normal activities can be resumed as tolerated. He is okay to travel as planned to [facility]. His swallowing is slow to improve and feel it would be advantageous to refer him to a speech therapist for evaluation. I encouraged him to remain hydrated especially with his history of colitis which he states he is doing ok with. I will plan to see him again in 6 months but asked him to call sooner with any worsening symptoms or other problems. Plan: Return in 6 months for follow-up carotid ultrasound. Pt seen and plan reviewed with Dr. Chandana Valles.      Letty Self PA-C

## 2022-12-12 DIAGNOSIS — I65.22 LEFT CAROTID STENOSIS: ICD-10-CM

## 2022-12-12 DIAGNOSIS — R13.10 DYSPHAGIA, UNSPECIFIED TYPE: Primary | ICD-10-CM

## 2023-01-12 ENCOUNTER — HOSPITAL ENCOUNTER (OUTPATIENT)
Dept: SPEECH THERAPY | Age: 68
Setting detail: THERAPIES SERIES
Discharge: HOME | End: 2023-01-12
Payer: MEDICARE

## 2023-01-12 PROCEDURE — 92610 EVALUATE SWALLOWING FUNCTION: CPT

## 2023-01-12 NOTE — PROGRESS NOTES
[address] [place] [address] One  SPEECH/LANGUAGE PATHOLOGY  CLINICAL ASSESSMENT OF SWALLOWING FUNCTION   and PLAN OF CARE:      PATIENT NAME:  Thaddeus Ling  (male)     MRN:  [id number]    :  1955  (69 y.o.)  STATUS:  Outpatient clinic   TODAY'S DATE:  2023  REFERRING PROVIDER:    Ignacia Segal MD  SPECIFIC PROVIDER ORDER: SLP eval and treat  Date of order:  12/10/2022  EVALUATING THERAPIST: CARIN Narayanan    CERTIFICATION/RECERTIFICATION PERIOD: 2023 to 23  INSURANCE PROVIDER:  Payor: Monet Daniels / Plan: Cal Cazares PPO / Product Type: Medicare /    INSURANCE ID:  [id number] - (Medicare Managed)   SECONDARY INSURANCE (if applicable):    No auth required; no visit limit  CPT Codes  EVALUATION: 38468  bedside swallow eval    TREATMENT:   Requesting treatment authorization for  12 visits over 12 weeks focusing on the following CPT codes:     26094  dysphagia tx        REFERRING/TREATMENT DIAGNOSIS: Dysphagia, unspecified type [R13.10]  Left carotid stenosis [I65.22]                  RESULTS:    DYSPHAGIA DIAGNOSIS:   Clinical indicators of mild-moderate oropharyngeal phase dysphagia       DIET RECOMMENDATIONS:  Regular consistency solids (IDDSI level 7) with  thin liquids (IDDSI level 0)     FEEDING RECOMMENDATIONS:     Assistance level:  No assistance needed      Compensatory strategies recommended: Double swallow, Multiple swallow, Small bites/sips, and Check for oral pocketing      Discussed recommendations with nursing and/or faxed report to referring provider: Yes    SPEECH THERAPY  PLAN OF CARE   The dysphagia POC is established based on physician order, dysphagia diagnosis and results of clinical assessment     Outpatient OR Home Care Skilled SLP intervention for dysphagia management is recommended 1-2 times per week to address the established treatment plan    Conditions Requiring Skilled Therapeutic Intervention for dysphagia:    Patient is performing below functional baseline d/t  current acute condition, Multiple diagnoses, multiple medications, and increased dependency upon caregivers. Specific dysphagia interventions to include:     Compensatory strategy training   Therapeutic exercises    Specific instructions for next treatment:  development and training of compensatory swallow strategies to improve airway protection and swallow function and initiate instruction of therapeutic exercises   Patient Treatment Goals:    Short Term Goals:  Pt will implement identified compensatory swallowing strategies on 90% of opportunities or greater to improve airway protection and swallow function. Pt will complete BOTR strength/ ROM exercises to reduce pharyngeal residuals and improve epiglottic inversion minimal verbal prompts  Pt will complete Shaker and/or Chin Tuck Against Resistance (CTAR) to increase UES relaxation diameter and increase anterior laryngeal excursion to reduce pharyngeal residuals and reduce risk of pen/asp with minimal verbal prompts. Long Term Goals:   Pt will improve oropharyngeal swallow function to ensure airway protection during PO intake to maintain adequate nutrition/hydration and decrease signs/symptoms of aspiration to less than 1 x/day. Patient/family Goal:    To return to prior level of function. Plan of care discussed with Patient   The Patient understand(s) the diagnosis, prognosis and plan of care     Rehabilitation Potential/Prognosis: good                    ADMITTING DIAGNOSIS: Dysphagia, unspecified type [R13.10]  Left carotid stenosis [I65.22]    PATIENT REPORT/COMPLAINT: food sticking in the throat. Onset of symptoms was TIA 9/2022.     PRIOR LEVEL OF SWALLOW FUNCTION:    PAST HISTORY OF DYSPHAGIA?: none reported    Home diet: Regular consistency solids (IDDSI level 7) with  thin liquids (IDDSI level 0)    PROCEDURE:  Consistencies Administered During the Evaluation   Liquids: thin liquid   Solids:  soft solid foods and solid foods      Method of Intake:   cup, straw  Self fed      Position:   Seated, upright    Strategies attempted:  Head turn: not successful  Chin tuck: not successful     CLINICAL ASSESSMENT:  Oral Stage:      Pt reported oral residues on tongue base and occasional L side pocketing. Pt reports normally initiating food into the R side of the mouth and chewing on the R side. Pharyngeal Stage:    Wet/gurgly vocal quality was noted after presentation of thin liquid with straw only. Pt reported feeling residual at base of tongue and in throat after intake of soft solids and solid consistencies. Cognition:   Within functional limits for this exam    Oral Peripheral Examination   Left lingual deviation     Current Respiratory Status    room air     Parameters of Speech Production  Respiration:  Adequate for speech production  Quality:   Hoarse and Breathy  Intensity: Quiet    Volitional Swallow: present     Volitional Cough:   present     Pain: No pain reported. EDUCATION:   The Speech Language Pathologist (SLP) completed education regarding results of evaluation and that intervention is warranted at this time. Learner: Patient  Education: Reviewed results and recommendations of this evaluation  Evaluation of Education:  Mitcheal Po understanding    This plan may be re-evaluated and revised as warranted. Treatment goals discussed with Patient   The Patient understand(s) the diagnosis, prognosis and plan of care     Evaluation Time includes thorough review of current medical information, gathering information on past medical history/social history and prior level of function, completion of standardized testing/informal observation of tasks, assessment of data and education on plan of care and goals. The admitting diagnosis and active problem list, as listed below have been reviewed prior to initiation of this evaluation.         ACTIVE PROBLEM LIST:   Patient Active Problem List   Diagnosis Coronary atherosclerosis of native coronary artery    Hypertension    Hyperlipidemia    Diabetes mellitus ([facility] [address]    Longstanding persistent atrial fibrillation (HCC)    History of echocardiogram    COVID-19    Adrenal adenoma    Testicular hypofunction    Ulcerative colitis ([facility] [address]    Thrombocytopenia (HCC)    Left carotid stenosis    History of tobacco use    RUE weakness    TIA (transient ischemic attack)    Dysphagia       Mikey Abraham, MS, [address]        Phone: [phone]     If you have any questions or concerns, please don't hesitate to call. Thank you for your referral.    Physician/Provider Signature:________________________________Date:__________________  By signing above, the therapists plan is approved by the physician/provider. All patients under [employer] must be signed by physician/provider.

## 2023-01-20 ENCOUNTER — HOSPITAL ENCOUNTER (OUTPATIENT)
Dept: SPEECH THERAPY | Age: 68
Setting detail: THERAPIES SERIES
Discharge: HOME | End: 2023-01-20
Payer: MEDICARE

## 2023-01-20 PROCEDURE — 92526 ORAL FUNCTION THERAPY: CPT

## 2023-01-20 NOTE — PROGRESS NOTES
SPEECH LANGUAGE PATHOLOGY  DAILY PROGRESS NOTE        PATIENT NAME:  Lynette Luna      :  1955          TODAY'S DATE:  2023       Current POC:    Short Term Goals:  Pt will implement identified compensatory swallowing strategies on 90% of opportunities or greater to improve airway protection and swallow function. Pt will complete BOTR strength/ ROM exercises to reduce pharyngeal residuals and improve epiglottic inversion minimal verbal prompts  Pt will complete Shaker and/or Chin Tuck Against Resistance (CTAR) to increase UES relaxation diameter and increase anterior laryngeal excursion to reduce pharyngeal residuals and reduce risk of pen/asp with minimal verbal prompts. Subjective:    Pt was seen this date for 30 minute dysphagia therapy. Pt was cooperative and engaged with all therapy activities. Objective:    Pt reports no change to swallow function. Clinician introduced both lingual ROM exercises and pharyngeal swallow exercises. The clinician first modeled each exercise and asked the pt to imitate. During lingual ROM, the pt demonstrated overall reduced ROM and mild oral apraxia. Giving visual feedback with the use of a mirror accuracy of tongue movement improved. The clinician gave a worksheet with tongue protrusion, retraction, lateralization, and tongue tip elevation exercises for at home use. The clinician introduced pharyngeal conditioning exercises including Sabiha, shaker, Mendelsohn, hard swallow which the pt imitated given clinician model. The clinician provided a worksheet with these exercises as well for home use. Assessment:    Pt continues to make progress toward therapy goals. Plan:    Continue to implement current POC.                                            Jolene Bundy MS, SLP      CPT code(s) 14310  dysphagia tx  Total minutes :  30 minutes

## 2023-01-27 ENCOUNTER — HOSPITAL ENCOUNTER (OUTPATIENT)
Dept: SPEECH THERAPY | Age: 68
Setting detail: THERAPIES SERIES
Discharge: HOME | End: 2023-01-27
Payer: MEDICARE

## 2023-01-27 PROCEDURE — 92526 ORAL FUNCTION THERAPY: CPT

## 2023-01-27 NOTE — PROGRESS NOTES
SPEECH LANGUAGE PATHOLOGY  DAILY PROGRESS NOTE        PATIENT NAME:  Severino Dillon      :  1955          TODAY'S DATE:  2023       Current POC:    Short Term Goals:  Pt will implement identified compensatory swallowing strategies on 90% of opportunities or greater to improve airway protection and swallow function. Pt will complete BOTR strength/ ROM exercises to reduce pharyngeal residuals and improve epiglottic inversion minimal verbal prompts  Pt will complete Shaker and/or Chin Tuck Against Resistance (CTAR) to increase UES relaxation diameter and increase anterior laryngeal excursion to reduce pharyngeal residuals and reduce risk of pen/asp with minimal verbal prompts. Subjective:    Pt was seen this date for 30 minute dysphagia therapy. Pt was cooperative and engaged with all therapy activities. Objective:    Pt reported consistent home practice of exercises provided last session. Pt reported some confusion with how to complete several exercises so clinician modeled each lingual ROM exercises and pharyngeal swallow exercises. The pt then imitated each exercise for the duration instructed with good return. Giving visual feedback with the use of a mirror accuracy of tongue movement improved. The clinician introduced vocal exercises as the pt's voice continues to display symptoms of dysphonia. The clinician introduced and modeled SOVT exercises including sustained phonation and straw phonation. The pt imitated all exercises with reduced breath support and loudness. Clinician modeled proper diaphragmatic breathing which the pt completed with fair return. Assessment:    Pt continues to make progress toward therapy goals. Plan:    Continue to implement current POC.                                            Lalito Alejo MS, SLP      CPT code(s) 12931  dysphagia tx  Total minutes :  30 minutes

## 2023-02-03 ENCOUNTER — HOSPITAL ENCOUNTER (OUTPATIENT)
Dept: SPEECH THERAPY | Age: 68
Setting detail: THERAPIES SERIES
Discharge: HOME | End: 2023-02-03
Payer: MEDICARE

## 2023-02-03 PROCEDURE — 92526 ORAL FUNCTION THERAPY: CPT

## 2023-02-03 NOTE — PROGRESS NOTES
SPEECH LANGUAGE PATHOLOGY  DAILY PROGRESS NOTE        PATIENT NAME:  Thaddeus Ling      :  1955          TODAY'S DATE:  2/3/2023       Current POC:    Short Term Goals:  Pt will implement identified compensatory swallowing strategies on 90% of opportunities or greater to improve airway protection and swallow function. Pt will complete BOTR strength/ ROM exercises to reduce pharyngeal residuals and improve epiglottic inversion minimal verbal prompts  Pt will complete Shaker and/or Chin Tuck Against Resistance (CTAR) to increase UES relaxation diameter and increase anterior laryngeal excursion to reduce pharyngeal residuals and reduce risk of pen/asp with minimal verbal prompts. Subjective:    Pt was seen this date for 30 minute dysphagia therapy. Pt was cooperative and engaged with all therapy activities. Pt reported skin pain at the site of his neck incision so the clinician recommended reaching out to his surgeon for a follow-up. Pt reports that swallowing has continued to improve with reduced feeling of food stuck in his throat. Objective: The clinician modeled each vocal exercise again this session as the pt was unsure of how to complete several of the exercises. Pt imitated SOVT and straw phonation exercises. Pt demonstrated improved breath support evidenced by longer periods of sustained phonation. The pt completed all tongue exercises (extension, retraction, tongue tip up, tongue lateralization) as the clinician observed. Pt shows improved range of motion during lingual extension. Pt continues to show reduced ROM during extension to nose and chin. Assessment:    Pt continues to make progress toward therapy goals. Plan:    Continue to implement current POC.                                            Leeann Bhardwaj MS, SLP      CPT code(s) 51593  dysphagia tx  Total minutes :  30 minutes

## 2023-02-10 ENCOUNTER — HOSPITAL ENCOUNTER (OUTPATIENT)
Dept: SPEECH THERAPY | Age: 68
Setting detail: THERAPIES SERIES
Discharge: HOME | End: 2023-02-10
Payer: MEDICARE

## 2023-02-10 PROCEDURE — 92526 ORAL FUNCTION THERAPY: CPT

## 2023-02-10 NOTE — PROGRESS NOTES
SPEECH LANGUAGE PATHOLOGY  DAILY PROGRESS NOTE        PATIENT NAME:  Felicity Mathis      :  1955          TODAY'S DATE:  2/10/2023       Current POC:    Short Term Goals:  Pt will implement identified compensatory swallowing strategies on 90% of opportunities or greater to improve airway protection and swallow function. Pt will complete BOTR strength/ ROM exercises to reduce pharyngeal residuals and improve epiglottic inversion minimal verbal prompts  Pt will complete Shaker and/or Chin Tuck Against Resistance (CTAR) to increase UES relaxation diameter and increase anterior laryngeal excursion to reduce pharyngeal residuals and reduce risk of pen/asp with minimal verbal prompts. Subjective:    Pt was seen this date for 30 minute dysphagia therapy. Pt was cooperative and engaged with all therapy activities. Pt reported no skin pain at site of incision since last appointment. Pt reports consistently completing exercises and feeling improvement in his swallow. Pt is going out of town for a month. Clinician placing him on hold with one final appointment scheduled for . Objective:    Clinician observed pt's completion of tongue ROM and strength exercises as well as pharyngeal conditioning exercises to ensure pt was completing correctly. Pt demonstrated exercises correctly and with improved ROM of tongue upon extraction. Pt still demonstrates limited ROM during extension to nose and chin. Pt showed improvement with Sabiha maneuver and was able to extend tongue during swallow. Assessment:    Pt continues to make progress toward therapy goals. Plan:    Continue to implement current POC.                                            Lucas Ward MS, SLP      CPT code(s) 75786  dysphagia tx  Total minutes :  30 minutes

## 2023-02-17 ENCOUNTER — APPOINTMENT (OUTPATIENT)
Dept: SPEECH THERAPY | Age: 68
End: 2023-02-17
Payer: MEDICARE

## 2023-03-10 ENCOUNTER — HOSPITAL ENCOUNTER (OUTPATIENT)
Dept: SPEECH THERAPY | Age: 68
Setting detail: THERAPIES SERIES
Discharge: HOME | End: 2023-03-10
Payer: MEDICARE

## 2023-03-10 PROCEDURE — 92526 ORAL FUNCTION THERAPY: CPT

## 2023-03-10 NOTE — PROGRESS NOTES
SPEECH LANGUAGE PATHOLOGY  DAILY PROGRESS NOTE        PATIENT NAME:  Anurag Avalos      :  1955          TODAY'S DATE:  3/10/2023       Current POC:    Short Term Goals:  Pt will implement identified compensatory swallowing strategies on 90% of opportunities or greater to improve airway protection and swallow function. Pt will complete BOTR strength/ ROM exercises to reduce pharyngeal residuals and improve epiglottic inversion minimal verbal prompts  Pt will complete Shaker and/or Chin Tuck Against Resistance (CTAR) to increase UES relaxation diameter and increase anterior laryngeal excursion to reduce pharyngeal residuals and reduce risk of pen/asp with minimal verbal prompts. Subjective:    Pt was seen this date for dysphagia therapy. Pt has not been seen for several weeks so clinician requested update on swallowing. Pt reports that his swallow has returned to baseline and he no longer has any difficulty swallowing. He has practiced his exercises daily and sees a significant improvement. Pt has returned to eating foods he enjoys. Objective:    Clinician reviewed exercises and encouraged pt to continue utilizing at home. Assessment:    Pt continues to make progress toward therapy goals. Plan:    Pt to be d/c this date.                                           Tim Rojas MS, SLP      CPT code(s) 29691  dysphagia tx  Total minutes :  30 minutes

## 2023-03-10 NOTE — PROGRESS NOTES
[address]  Outpatient Speech Therapy  Phone: [phone] Fax: [phone]    [facility] SUMMARY    Date of Report: 3/10/2023    Patient Joseph Ma  : 1955  MRN: [id number]    Diagnosis: Dysphagia, unspecified type [R13.10] ; Left carotid stenosis [I65.22]  Referring Physician: Conrado Reynaga MD    SUBJECTIVE  Patient attended 5 speech therapy sessions from 2023 to 3/10/2023 , with several cancellations. Focus of current treatment sessions has been on dysphagia therapy including compensatory strategies and therapeutic exercises. OBJECTIVE / GOAL STATUS   (Status Key: GM = Goal Met, MP = Making Progress, BP = Beginning Progress, NI = Not Introduced, D/C = Discontinue Goal, NM = Not Met)    Short Term Goals:  Pt will implement identified compensatory swallowing strategies on 90% of opportunities or greater to improve airway protection and swallow function. GM  Pt will complete BOTR strength/ ROM exercises to reduce pharyngeal residuals and improve epiglottic inversion minimal verbal prompts. GM  Pt will complete Shaker and/or Chin Tuck Against Resistance (CTAR) to increase UES relaxation diameter and increase anterior laryngeal excursion to reduce pharyngeal residuals and reduce risk of pen/asp with minimal verbal prompts. GM     Long Term Goals:              Pt will improve oropharyngeal swallow function to ensure airway protection during PO intake to maintain adequate nutrition/hydration and decrease signs/symptoms of aspiration to less than 1 x/day. GM    ASSESSMENT / STANDARDIZED TEST RESULTS  Patient has made excellent progress over the past several months. RECOMMENDATIONS  Patient is discharged at this time. Pt reports that his swallow has returned to baseline and he no longer has any difficulty swallowing. He has practiced his exercises daily and sees a significant improvement.  Pt has returned to eating foods he enjoys. Patient and/or caregiver aware of diagnosis? Yes   Patient and/or caregiver agree with POC?  Yes       Thank you for this referral.     Constance [address], SLP  3/10/2023

## 2023-04-17 ENCOUNTER — OFFICE VISIT (OUTPATIENT)
Dept: CARDIOLOGY CLINIC | Age: 68
End: 2023-04-17
Payer: MEDICARE

## 2023-04-17 VITALS
RESPIRATION RATE: 12 BRPM | HEIGHT: 73 IN | WEIGHT: 168 LBS | HEART RATE: 68 BPM | SYSTOLIC BLOOD PRESSURE: 128 MMHG | DIASTOLIC BLOOD PRESSURE: 85 MMHG | BODY MASS INDEX: 22.26 KG/M2

## 2023-04-17 DIAGNOSIS — I25.10 ATHEROSCLEROSIS OF NATIVE CORONARY ARTERY OF NATIVE HEART WITHOUT ANGINA PECTORIS: Primary | ICD-10-CM

## 2023-04-17 DIAGNOSIS — I48.21 PERMANENT ATRIAL FIBRILLATION (HCC): ICD-10-CM

## 2023-04-17 PROBLEM — I65.22 LEFT CAROTID STENOSIS: Status: RESOLVED | Noted: 2022-05-19 | Resolved: 2023-04-17

## 2023-04-17 PROBLEM — R29.898 RUE WEAKNESS: Status: RESOLVED | Noted: 2022-09-30 | Resolved: 2023-04-17

## 2023-04-17 PROBLEM — Z98.890 H/O CAROTID ENDARTERECTOMY: Status: ACTIVE | Noted: 2023-04-17

## 2023-04-17 PROBLEM — R13.10 DYSPHAGIA: Status: RESOLVED | Noted: 2022-12-06 | Resolved: 2023-04-17

## 2023-04-17 PROBLEM — U07.1 COVID-19: Status: RESOLVED | Noted: 2021-02-06 | Resolved: 2023-04-17

## 2023-04-17 PROCEDURE — 3074F SYST BP LT 130 MM HG: CPT | Performed by: INTERNAL MEDICINE

## 2023-04-17 PROCEDURE — 1123F ACP DISCUSS/DSCN MKR DOCD: CPT | Performed by: INTERNAL MEDICINE

## 2023-04-17 PROCEDURE — 3079F DIAST BP 80-89 MM HG: CPT | Performed by: INTERNAL MEDICINE

## 2023-04-17 PROCEDURE — 99214 OFFICE O/P EST MOD 30 MIN: CPT | Performed by: INTERNAL MEDICINE

## 2023-04-17 PROCEDURE — 93000 ELECTROCARDIOGRAM COMPLETE: CPT | Performed by: INTERNAL MEDICINE

## 2023-04-17 NOTE — PROGRESS NOTES
insulin lispro (HUMALOG) 100 UNIT/ML injection vial Inject into the skin 3 times daily (before meals) PER SLIDING SCALE      Probiotic Product (PROBIOTIC DAILY) CAPS Take 1 capsule by mouth nightly      vedolizumab (ENTYVIO) 300 MG injection Infuse 300 mg intravenously every 28 days GIVEN AT [facility]      Turmeric 500 MG CAPS Take 1 capsule by mouth 2 times daily      metoprolol succinate (TOPROL XL) 50 MG extended release tablet Take 1 tablet by mouth every morning      CIALIS 20 MG tablet Take 1 tablet by mouth as needed for Erectile Dysfunction      amLODIPine-benazepril (LOTREL) 10-20 MG per capsule Take 1 capsule by mouth every morning      terazosin (HYTRIN) 5 MG capsule Take 1 capsule by mouth nightly      hydrochlorothiazide (HYDRODIURIL) 25 MG tablet Take 1 tablet by mouth every morning      pravastatin (PRAVACHOL) 20 MG tablet Take 1 tablet by mouth nightly      balsalazide (COLAZAL) 750 MG capsule Take 3 capsules by mouth 3 times daily      Testosterone 20.25 MG/1.25GM (1.62%) GEL Place 1 Package onto the skin daily      insulin glargine (LANTUS) 100 UNIT/ML injection Inject 18 Units into the skin every morning       No current facility-administered medications for this visit. Allergies   Allergen Reactions    Sulfa Antibiotics Other (See Comments)     MOUTH BLISTERS       Vitals:    04/17/23 0814   BP: 128/85   Pulse: 68   Resp: 12   Weight: 168 lb (76.2 kg)   Height: 6' 1\" (1.854 m)               SUBJECTIVE: Katty Miguel presents to the office today for re-evaluation of cardiac diagnoses     He complains of  no cardiac issues   And is back on NOAC and has additional episodes of  GIB  Undergoing Entivio injections per [facility] adjusting meds. Raquel Morel [facility] for UC and dr Amanda Galindo for renal issues/htn. Physical Exam   /85   Pulse 68   Resp 12   Ht 6' 1\" (1.854 m)   Wt 168 lb (76.2 kg)   BMI 22.16 kg/m²   Constitutional: Oriented to person, place, and time. Thin, No distress.     Neck: no

## 2023-04-25 ENCOUNTER — OFFICE VISIT (OUTPATIENT)
Dept: VASCULAR SURGERY | Age: 68
End: 2023-04-25
Payer: MEDICARE

## 2023-04-25 ENCOUNTER — TELEPHONE (OUTPATIENT)
Dept: VASCULAR SURGERY | Age: 68
End: 2023-04-25

## 2023-04-25 VITALS — HEIGHT: 73 IN | WEIGHT: 170 LBS | BODY MASS INDEX: 22.53 KG/M2

## 2023-04-25 DIAGNOSIS — R13.10 DYSPHAGIA, UNSPECIFIED TYPE: ICD-10-CM

## 2023-04-25 DIAGNOSIS — I65.22 LEFT CAROTID STENOSIS: Primary | ICD-10-CM

## 2023-04-25 PROCEDURE — 1123F ACP DISCUSS/DSCN MKR DOCD: CPT | Performed by: SURGERY

## 2023-04-25 PROCEDURE — 99213 OFFICE O/P EST LOW 20 MIN: CPT | Performed by: SURGERY

## 2023-04-25 RX ORDER — PREDNISONE 20 MG/1
20 TABLET ORAL DAILY
COMMUNITY

## 2023-04-25 RX ORDER — PANTOPRAZOLE SODIUM 40 MG/1
40 TABLET, DELAYED RELEASE ORAL DAILY
COMMUNITY

## 2023-04-25 RX ORDER — FINASTERIDE 5 MG/1
5 TABLET, FILM COATED ORAL DAILY
COMMUNITY

## 2023-04-25 NOTE — PROGRESS NOTES
Vascular Surgery Outpatient Progress Note      Chief Complaint   Patient presents with    Circulatory Problem     Left carotid stenosis       HISTORY OF PRESENT ILLNESS:                The patient is a 69 y.o. male who returns for follow-up evaluation of left carotid endarterectomy. The patient is accompanied by his wife. He states significant improvement in his swallowing and speaking that he feels are back to normal.  He remains in atrial fibrillation and on anticoagulation. With his history of ulcerative colitis he has been recommended to have the Watchman procedure. He is being evaluated in [place] for this. Past Medical History:        Diagnosis Date    SHIVAM (cerebral atherosclerosis)     Coronary atherosclerosis of native coronary artery     COVID     Diabetes mellitus ([facility] [address]     Dizziness     History of tobacco use 5/19/2022    Hyperlipidemia     Hypertension     Left carotid stenosis 5/19/2022    Ulcerative colitis [facility])      Past Surgical History:        Procedure Laterality Date    CAROTID ENDARTERECTOMY Left 10/03/2022    CAROTID ENDARTERECTOMY performed by Paolo Santa MD at [address] [place] [address] CATH LAB PROCEDURE      SIGMOIDOSCOPY      SIGMOIDOSCOPY       Current Medications:   Prior to Admission medications    Medication Sig Start Date End Date Taking?  Authorizing Provider   predniSONE (DELTASONE) 20 MG tablet Take 1 tablet by mouth daily   Yes Historical Provider, MD   finasteride (PROSCAR) 5 MG tablet Take 1 tablet by mouth daily   Yes Historical Provider, MD   pantoprazole (PROTONIX) 40 MG tablet Take 1 tablet by mouth daily   Yes Historical Provider, MD   ELIQUIS 5 MG TABS tablet TAKE 1 TABLET TWICE A DAY 11/28/22  Yes Smita Fritz MD   Ascorbic Acid (VITAMIN C) 500 MG CAPS Take by mouth   Yes Historical Provider, MD   Zinc Gluconate 10 MG LOZG Take 1 lozenge by mouth daily   Yes Historical Provider, MD   acetaminophen (TYLENOL) 325 MG tablet Take 2 tablets

## 2023-04-26 ENCOUNTER — HOSPITAL ENCOUNTER (OUTPATIENT)
Dept: CARDIOLOGY | Age: 68
Discharge: HOME | End: 2023-04-26
Payer: MEDICARE

## 2023-04-26 DIAGNOSIS — I65.23 BILATERAL CAROTID ARTERY STENOSIS: ICD-10-CM

## 2023-04-26 PROCEDURE — 93880 EXTRACRANIAL BILAT STUDY: CPT

## 2023-04-26 NOTE — PROGRESS NOTES
[facility] Heart [facility] Vascular Lab - [facility]    This is a pre read worksheet - prior to official physician interpretation    Kirstin Silvestre  1955  Date of study: 4/26/23    Indication for study:  Carotid artery stenosis  Study : Bilateral Carotid Artery Duplex Examination    Duplex examination of the RIGHT carotid artery system identifies atherosclerotic plaque. The peak systolic velocity in internal carotid artery was 69 centimeters / second. The maximum end diastolic velocity was 23 centimeters / second. The ICA/CCA ratio is 1.2. The right vertebral artery has antegrade flow. Duplex examination of the LEFT carotid artery system identifies atherosclerotic plaque. The peak systolic velocity in internal carotid artery was 64 centimeters / second. The maximum end diastolic velocity was 21 centimeters / second. The ICA/CCA ratio is 0.9. The left vertebral artery has antegrade flow.         LAST STUDY  5/9/2022 @ [facility]  Rt <50  Lt 70-89  Left CEA 10/3/2022

## 2023-05-01 ENCOUNTER — TELEPHONE (OUTPATIENT)
Dept: VASCULAR SURGERY | Age: 68
End: 2023-05-01

## 2023-05-01 DIAGNOSIS — I65.22 LEFT CAROTID STENOSIS: Primary | ICD-10-CM

## 2023-06-29 ENCOUNTER — TELEPHONE (OUTPATIENT)
Dept: VASCULAR SURGERY | Age: 68
End: 2023-06-29

## 2023-07-21 ENCOUNTER — HOSPITAL ENCOUNTER (EMERGENCY)
Age: 68
Discharge: HOME | End: 2023-07-22
Attending: STUDENT IN AN ORGANIZED HEALTH CARE EDUCATION/TRAINING PROGRAM
Payer: MEDICARE

## 2023-07-21 DIAGNOSIS — T78.2XXA ANAPHYLAXIS, INITIAL ENCOUNTER: Primary | ICD-10-CM

## 2023-07-21 PROCEDURE — 96374 THER/PROPH/DIAG INJ IV PUSH: CPT

## 2023-07-21 PROCEDURE — 99284 EMERGENCY DEPT VISIT MOD MDM: CPT

## 2023-07-21 PROCEDURE — 96375 TX/PRO/DX INJ NEW DRUG ADDON: CPT

## 2023-07-21 PROCEDURE — 6360000002 HC RX W HCPCS

## 2023-07-21 PROCEDURE — 96372 THER/PROPH/DIAG INJ SC/IM: CPT

## 2023-07-21 PROCEDURE — 2580000003 HC RX 258: Performed by: STUDENT IN AN ORGANIZED HEALTH CARE EDUCATION/TRAINING PROGRAM

## 2023-07-21 PROCEDURE — A4216 STERILE WATER/SALINE, 10 ML: HCPCS

## 2023-07-21 PROCEDURE — 2580000003 HC RX 258

## 2023-07-21 PROCEDURE — 2500000003 HC RX 250 WO HCPCS

## 2023-07-21 RX ORDER — EPINEPHRINE 1 MG/ML
0.3 INJECTION, SOLUTION, CONCENTRATE INTRAVENOUS ONCE
Status: COMPLETED | OUTPATIENT
Start: 2023-07-21 | End: 2023-07-21

## 2023-07-21 RX ORDER — EPINEPHRINE 0.3 MG/.3ML
0.3 INJECTION SUBCUTANEOUS
Qty: 1 EACH | Refills: 0 | Status: SHIPPED | OUTPATIENT
Start: 2023-07-21 | End: 2023-07-21

## 2023-07-21 RX ORDER — 0.9 % SODIUM CHLORIDE 0.9 %
1000 INTRAVENOUS SOLUTION INTRAVENOUS ONCE
Status: COMPLETED | OUTPATIENT
Start: 2023-07-21 | End: 2023-07-21

## 2023-07-21 RX ADMIN — FAMOTIDINE 20 MG: 10 INJECTION, SOLUTION INTRAVENOUS at 20:25

## 2023-07-21 RX ADMIN — EPINEPHRINE 0.3 MG: 1 INJECTION, SOLUTION, CONCENTRATE INTRAVENOUS at 20:31

## 2023-07-21 RX ADMIN — METHYLPREDNISOLONE SODIUM SUCCINATE 60 MG: 125 INJECTION, POWDER, FOR SOLUTION INTRAMUSCULAR; INTRAVENOUS at 20:22

## 2023-07-21 RX ADMIN — SODIUM CHLORIDE 1000 ML: 9 INJECTION, SOLUTION INTRAVENOUS at 20:39

## 2023-07-22 VITALS
OXYGEN SATURATION: 98 % | TEMPERATURE: 98.1 F | HEART RATE: 97 BPM | RESPIRATION RATE: 14 BRPM | SYSTOLIC BLOOD PRESSURE: 143 MMHG | DIASTOLIC BLOOD PRESSURE: 93 MMHG

## 2023-07-22 NOTE — ED PROVIDER NOTES
morningHistorical Med      pravastatin (PRAVACHOL) 20 MG tablet Take 1 tablet by mouth nightlyHistorical Med      balsalazide (COLAZAL) 750 MG capsule Take 3 capsules by mouth 3 times dailyHistorical Med      Testosterone 20.25 MG/1.25GM (1.62%) GEL Place 1 Package onto the skin dailyHistorical Med      insulin glargine (LANTUS) 100 UNIT/ML injection Inject 18 Units into the skin every morningHistorical Med             ALLERGIES     Bee venom and Sulfa antibiotics    FAMILYHISTORY       Family History   Problem Relation Age of Onset    Cancer Mother         SOCIAL HISTORY       Social History     Tobacco Use    Smoking status: Former     Packs/day: 1.00     Types: Cigarettes     Start date: 1972     Quit date: 1982     Years since quittin.5    Smokeless tobacco: Never   Vaping Use    Vaping Use: Never used   Substance Use Topics    Alcohol use: Yes     Comment: occasionally    Drug use: No       SCREENINGS        Gate Coma Scale  Eye Opening: Spontaneous  Best Verbal Response: Oriented  Best Motor Response: Obeys commands  Gate Coma Scale Score: 15                CIWA Assessment  BP: (!) 143/93  Pulse: 97           PHYSICAL EXAM  1 or more Elements     ED Triage Vitals [23]   BP Temp Temp src Pulse Respirations SpO2 Height Weight   -- -- -- 64 18 96 % -- --       Physical Exam  Constitutional:       General: He is not in acute distress. Appearance: Normal appearance. He is not ill-appearing. HENT:      Head: Normocephalic. Mouth/Throat:      Mouth: Mucous membranes are moist.      Comments: Swelling of the lips and tongue noted   No notable voice changes  Neck:      Comments: No notable anterior neck fullness  Cardiovascular:      Rate and Rhythm: Normal rate and regular rhythm. Pulses: Normal pulses. Pulmonary:      Comments: No stridor   No respiratory distress  No tachypnea  No tripoding   No hot potato voice   Abdominal:      Tenderness:  There is no abdominal tenderness. Skin:     Comments: Mild erythema around the left knee where he was stung   Neurological:      Mental Status: He is alert. DIAGNOSTIC RESULTS   LABS:    Labs Reviewed - No data to display    When ordered only abnormal lab results are displayed. All other labs were within normal range or not returned as of this dictation. PROCEDURES   Unless otherwise noted below, none     Procedures      PAST MEDICAL HISTORY      has a past medical history of SHIVAM (cerebral atherosclerosis), Coronary atherosclerosis of native coronary artery, COVID, Diabetes mellitus ([address]), Dizziness, History of tobacco use (5/19/2022), Hyperlipidemia, Hypertension, Left carotid stenosis (5/19/2022), and Ulcerative colitis ([address] EMERGENCY DEPARTMENT COURSE and DIFFERENTIAL DIAGNOSIS/MDM:   Vitals:    Vitals:    07/21/23 2200 07/21/23 2257 07/21/23 2300 07/22/23 0000   BP: 124/66  135/75 (!) 143/93   Pulse: (!) 102 94 96 97   Resp: 16  16 14   Temp:       TempSrc:       SpO2: 96%  99% 98%         ED Course as of 07/22/23 0541   Fri Jul 21, 2023 2122 Reevaluated patient who has some reduction in his swelling but still complains of some mild itching in his lower extremities. Continues to have a patent airway with no stridor and no respiratory distress. [CB]   1745 Patient feeling much better upon reassessment. Swelling has improved. His heart rate is 94 and he is in no acute distress. [KG]      ED Course User Index  [CB] Elvin Marte DO  [KG] Amadou Nguyen DO          CC/HPI Summary, Stable/unstable, exam findings  Chris Vargas is a 69 y.o. male who presents with the complaint of an allergic reaction. Pt states that he was stung by several bees around 6 pm while cutting some wood. He felt that his throat was closing and his lips and tongue were swelling shortly thereafter. On arrival patient appears in no acute distress. Vitals are within normal limits.   Exam revealed some mild lower lip swelling and

## 2023-07-22 NOTE — DISCHARGE INSTRUCTIONS
Return to the emergency department if you experience any worsening symptoms or other acute symptoms or concerns.

## 2023-10-24 ENCOUNTER — OFFICE VISIT (OUTPATIENT)
Dept: CARDIOLOGY CLINIC | Age: 68
End: 2023-10-24
Payer: MEDICARE

## 2023-10-24 VITALS
RESPIRATION RATE: 12 BRPM | DIASTOLIC BLOOD PRESSURE: 75 MMHG | WEIGHT: 172 LBS | SYSTOLIC BLOOD PRESSURE: 120 MMHG | HEIGHT: 73 IN | HEART RATE: 85 BPM | BODY MASS INDEX: 22.8 KG/M2

## 2023-10-24 DIAGNOSIS — I25.10 ATHEROSCLEROSIS OF NATIVE CORONARY ARTERY OF NATIVE HEART WITHOUT ANGINA PECTORIS: ICD-10-CM

## 2023-10-24 DIAGNOSIS — I48.21 PERMANENT ATRIAL FIBRILLATION (HCC): Primary | ICD-10-CM

## 2023-10-24 PROCEDURE — 3074F SYST BP LT 130 MM HG: CPT | Performed by: INTERNAL MEDICINE

## 2023-10-24 PROCEDURE — 99214 OFFICE O/P EST MOD 30 MIN: CPT | Performed by: INTERNAL MEDICINE

## 2023-10-24 PROCEDURE — 93000 ELECTROCARDIOGRAM COMPLETE: CPT | Performed by: INTERNAL MEDICINE

## 2023-10-24 PROCEDURE — 3078F DIAST BP <80 MM HG: CPT | Performed by: INTERNAL MEDICINE

## 2023-10-24 PROCEDURE — 1123F ACP DISCUSS/DSCN MKR DOCD: CPT | Performed by: INTERNAL MEDICINE

## 2023-10-24 RX ORDER — USTEKINUMAB 45 MG/.5ML
INJECTION, SOLUTION SUBCUTANEOUS
COMMUNITY

## 2023-10-24 RX ORDER — DICYCLOMINE HYDROCHLORIDE 10 MG/1
CAPSULE ORAL
COMMUNITY
Start: 2023-09-29

## 2023-10-24 NOTE — PROGRESS NOTES
Chief Complaint   Patient presents with    Atrial Fibrillation    Coronary Artery Disease       Patient Active Problem List    Diagnosis Date Noted    H/O carotid endarterectomy 04/17/2023    TIA (transient ischemic attack) 10/01/2022    History of tobacco use 05/19/2022    Thrombocytopenia ([address])     History of echocardiogram 10/01/2020     Overview Note:     A. Echo 8/17/2020: EF 55%, mild LVH, mild MR      Permanent atrial fibrillation ([address]) 07/16/2020     Overview Note:     A. CHADS-VASC=4  B. [facility] 10/2023: WATCHMAN FLX Pro device (31mm)        Coronary atherosclerosis of native coronary artery 03/07/2012     Overview Note:       Cardiac catheterization, 4/15/05. Moderate coronary atherosclerosis in RCA, circumflex and LAD arteries. Normal LV systolic function. LVEF 65%. Exercise Myoview 4/21/08.  13 METS, no chest pain, normal ECG, normal scan. Hypertension     Hyperlipidemia      Overview Note:       Possible LFT abnormality with Lipitor.  Myalgias with crestor      Diabetes mellitus ([address])     Adrenal adenoma 07/06/2011    Testicular hypofunction 07/06/2011    Ulcerative colitis ([address]) 06/17/2011     Overview Note:     Last Assessment & Plan:   Continue with medical management  Appreciate ongoing GI input  No surgical indication at this time         Current Outpatient Medications   Medication Sig Dispense Refill    dicyclomine (BENTYL) 10 MG capsule take 1 capsule by mouth three times a day if needed      ustekinumab (STELARA) 45 MG/0.5ML SOLN injection       EPINEPHrine (EPIPEN 2-GISEL) 0.3 MG/0.3ML SOAJ injection Inject 0.3 mLs into the muscle once as needed (anaphylaxis) Use as directed for allergic reaction 1 each 0    predniSONE (DELTASONE) 20 MG tablet Take 15 mg by mouth daily      finasteride (PROSCAR) 5 MG tablet Take 1 tablet by mouth daily      ELIQUIS 5 MG TABS tablet TAKE 1 TABLET TWICE A  tablet 3    Ascorbic Acid (VITAMIN C) 500 MG CAPS Take by mouth      Zinc Gluconate 10 MG LOZG

## 2023-11-09 ENCOUNTER — OFFICE VISIT (OUTPATIENT)
Dept: FAMILY MEDICINE CLINIC | Age: 68
End: 2023-11-09
Payer: MEDICARE

## 2023-11-09 VITALS
TEMPERATURE: 97.8 F | DIASTOLIC BLOOD PRESSURE: 82 MMHG | BODY MASS INDEX: 22.95 KG/M2 | OXYGEN SATURATION: 98 % | HEIGHT: 73 IN | WEIGHT: 173.2 LBS | RESPIRATION RATE: 18 BRPM | SYSTOLIC BLOOD PRESSURE: 124 MMHG | HEART RATE: 82 BPM
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
## 2023-11-09 DIAGNOSIS — S30.860A TICK BITE OF BUTTOCK, INITIAL ENCOUNTER: Primary | ICD-10-CM

## 2023-11-09 PROCEDURE — 3079F DIAST BP 80-89 MM HG: CPT | Performed by: PHYSICIAN ASSISTANT

## 2023-11-09 PROCEDURE — 99213 OFFICE O/P EST LOW 20 MIN: CPT | Performed by: PHYSICIAN ASSISTANT

## 2023-11-09 PROCEDURE — 1123F ACP DISCUSS/DSCN MKR DOCD: CPT | Performed by: PHYSICIAN ASSISTANT

## 2023-11-09 PROCEDURE — 3074F SYST BP LT 130 MM HG: CPT | Performed by: PHYSICIAN ASSISTANT

## 2023-11-09 RX ORDER — DOXYCYCLINE HYCLATE 100 MG
200 TABLET ORAL ONCE
Qty: 2 TABLET | Refills: 0 | Status: SHIPPED | OUTPATIENT
Start: 2023-11-09 | End: 2023-11-09

## 2023-11-09 NOTE — PROGRESS NOTES
Chief Complaint       Tick Removal (Onset x 2 days - left side of butt /States that he was able to get out the tick /However the area is hard/reddened )      History of Present Illness   Source of history provided by:  patient. Delroy Ferrara is a 77 y.o. old male presenting to the walk in clinic for a tick bite to the left buttock. Pt estimates the tick was attached for less than 24 hours. Removal was successful prior to arrival. Reports mild associated erythema at the site but denies any bleeding or drainage. Denies any N/T to the site. Pt denies any significant pain at the site, loss of function to the area, fever, chills, arthralgias, myalgias, headache, bull's-eye lesions, lethargy, N/V, or syncope. ROS    Unless otherwise stated in this report or unable to obtain because of the patient's clinical or mental status as evidenced by the medical record, this patients's positive and negative responses for Review of Systems, constitutional, psych, eyes, ENT, cardiovascular, respiratory, gastrointestinal, neurological, genitourinary, musculoskeletal, integument systems and systems related to the presenting problem are either stated in the preceding or were not pertinent or were negative for the symptoms and/or complaints related to the medical problem. Past Medical History:  has a past medical history of SHIVAM (cerebral atherosclerosis), Coronary atherosclerosis of native coronary artery, COVID, Diabetes mellitus ([address]), Dizziness, History of tobacco use, Hyperlipidemia, Hypertension, Left carotid stenosis, and Ulcerative colitis ([address] Past Surgical History:  has a past surgical history that includes Diagnostic Cardiac Cath Lab Procedure; sigmoidoscopy; Colonoscopy; Carotid endarterectomy (Left, 10/03/2022); and sigmoidoscopy. Social History:  reports that he quit smoking about 41 years ago. His smoking use included cigarettes. He started smoking about 51 years ago.  He smoked an average of 1 pack per

## 2024-02-06 DIAGNOSIS — D50.9 IRON DEFICIENCY ANEMIA, UNSPECIFIED IRON DEFICIENCY ANEMIA TYPE: Primary | ICD-10-CM

## 2024-02-06 RX ORDER — DIPHENHYDRAMINE HYDROCHLORIDE 50 MG/ML
50 INJECTION INTRAMUSCULAR; INTRAVENOUS
OUTPATIENT
Start: 2024-02-09

## 2024-02-06 RX ORDER — SODIUM CHLORIDE 0.9 % (FLUSH) 0.9 %
5-40 SYRINGE (ML) INJECTION PRN
OUTPATIENT
Start: 2024-02-09

## 2024-02-06 RX ORDER — SODIUM CHLORIDE 9 MG/ML
5-250 INJECTION, SOLUTION INTRAVENOUS PRN
OUTPATIENT
Start: 2024-02-09

## 2024-02-06 RX ORDER — EPINEPHRINE 1 MG/ML
0.3 INJECTION, SOLUTION, CONCENTRATE INTRAVENOUS PRN
OUTPATIENT
Start: 2024-02-09

## 2024-02-06 RX ORDER — ALBUTEROL SULFATE 90 UG/1
4 AEROSOL, METERED RESPIRATORY (INHALATION) PRN
OUTPATIENT
Start: 2024-02-09

## 2024-02-06 RX ORDER — SODIUM CHLORIDE 9 MG/ML
INJECTION, SOLUTION INTRAVENOUS CONTINUOUS
OUTPATIENT
Start: 2024-02-09

## 2024-02-20 ENCOUNTER — HOSPITAL ENCOUNTER (OUTPATIENT)
Dept: INFUSION THERAPY | Age: 69
Setting detail: INFUSION SERIES
Discharge: HOME | End: 2024-02-20
Payer: MEDICARE

## 2024-02-20 VITALS
BODY MASS INDEX: 23.59 KG/M2 | DIASTOLIC BLOOD PRESSURE: 63 MMHG | RESPIRATION RATE: 18 BRPM | HEART RATE: 73 BPM | OXYGEN SATURATION: 99 % | TEMPERATURE: 97.5 F | WEIGHT: 178 LBS | SYSTOLIC BLOOD PRESSURE: 114 MMHG | HEIGHT: 73 IN

## 2024-02-20 DIAGNOSIS — D50.9 IRON DEFICIENCY ANEMIA, UNSPECIFIED IRON DEFICIENCY ANEMIA TYPE: Primary | ICD-10-CM

## 2024-02-20 PROCEDURE — 2580000003 HC RX 258: Performed by: INTERNAL MEDICINE

## 2024-02-20 PROCEDURE — 96374 THER/PROPH/DIAG INJ IV PUSH: CPT

## 2024-02-20 PROCEDURE — 6360000002 HC RX W HCPCS: Performed by: INTERNAL MEDICINE

## 2024-02-20 RX ORDER — SODIUM CHLORIDE 0.9 % (FLUSH) 0.9 %
5-40 SYRINGE (ML) INJECTION PRN
Status: DISCONTINUED | OUTPATIENT
Start: 2024-02-20 | End: 2024-02-21 | Stop reason: HOSPADM

## 2024-02-20 RX ORDER — SODIUM CHLORIDE 9 MG/ML
5-250 INJECTION, SOLUTION INTRAVENOUS PRN
Status: DISCONTINUED | OUTPATIENT
Start: 2024-02-20 | End: 2024-02-21 | Stop reason: HOSPADM

## 2024-02-20 RX ORDER — ALBUTEROL SULFATE 90 UG/1
4 AEROSOL, METERED RESPIRATORY (INHALATION) PRN
OUTPATIENT
Start: 2024-02-27

## 2024-02-20 RX ORDER — SODIUM CHLORIDE 0.9 % (FLUSH) 0.9 %
5-40 SYRINGE (ML) INJECTION PRN
OUTPATIENT
Start: 2024-02-27

## 2024-02-20 RX ORDER — SODIUM CHLORIDE 9 MG/ML
INJECTION, SOLUTION INTRAVENOUS CONTINUOUS
OUTPATIENT
Start: 2024-02-27

## 2024-02-20 RX ORDER — EPINEPHRINE 1 MG/ML
0.3 INJECTION, SOLUTION, CONCENTRATE INTRAVENOUS PRN
OUTPATIENT
Start: 2024-02-27

## 2024-02-20 RX ORDER — SODIUM CHLORIDE 9 MG/ML
5-250 INJECTION, SOLUTION INTRAVENOUS PRN
OUTPATIENT
Start: 2024-02-27

## 2024-02-20 RX ORDER — DIPHENHYDRAMINE HYDROCHLORIDE 50 MG/ML
50 INJECTION INTRAMUSCULAR; INTRAVENOUS
OUTPATIENT
Start: 2024-02-27

## 2024-02-20 RX ADMIN — SODIUM CHLORIDE, PRESERVATIVE FREE 10 ML: 5 INJECTION INTRAVENOUS at 10:13

## 2024-02-20 RX ADMIN — IRON SUCROSE 200 MG: 20 INJECTION, SOLUTION INTRAVENOUS at 10:17

## 2024-02-20 RX ADMIN — SODIUM CHLORIDE 20 ML/HR: 9 INJECTION, SOLUTION INTRAVENOUS at 10:15

## 2024-03-08 ENCOUNTER — HOSPITAL ENCOUNTER (OUTPATIENT)
Dept: INFUSION THERAPY | Age: 69
Setting detail: INFUSION SERIES
Discharge: HOME | End: 2024-03-08
Payer: MEDICARE

## 2024-03-08 VITALS
HEART RATE: 73 BPM | SYSTOLIC BLOOD PRESSURE: 113 MMHG | WEIGHT: 172 LBS | RESPIRATION RATE: 18 BRPM | TEMPERATURE: 97.7 F | HEIGHT: 73 IN | DIASTOLIC BLOOD PRESSURE: 71 MMHG | BODY MASS INDEX: 22.8 KG/M2 | OXYGEN SATURATION: 100 %

## 2024-03-08 DIAGNOSIS — D50.9 IRON DEFICIENCY ANEMIA, UNSPECIFIED IRON DEFICIENCY ANEMIA TYPE: Primary | ICD-10-CM

## 2024-03-08 PROCEDURE — 96374 THER/PROPH/DIAG INJ IV PUSH: CPT

## 2024-03-08 PROCEDURE — 6360000002 HC RX W HCPCS: Performed by: INTERNAL MEDICINE

## 2024-03-08 PROCEDURE — 2580000003 HC RX 258: Performed by: INTERNAL MEDICINE

## 2024-03-08 RX ORDER — SODIUM CHLORIDE 9 MG/ML
INJECTION, SOLUTION INTRAVENOUS CONTINUOUS
OUTPATIENT
Start: 2024-03-12

## 2024-03-08 RX ORDER — DIPHENHYDRAMINE HYDROCHLORIDE 50 MG/ML
50 INJECTION INTRAMUSCULAR; INTRAVENOUS
OUTPATIENT
Start: 2024-03-12

## 2024-03-08 RX ORDER — SODIUM CHLORIDE 9 MG/ML
5-250 INJECTION, SOLUTION INTRAVENOUS PRN
OUTPATIENT
Start: 2024-03-12

## 2024-03-08 RX ORDER — SODIUM CHLORIDE 9 MG/ML
5-250 INJECTION, SOLUTION INTRAVENOUS PRN
Status: DISCONTINUED | OUTPATIENT
Start: 2024-03-08 | End: 2024-03-09 | Stop reason: HOSPADM

## 2024-03-08 RX ORDER — SODIUM CHLORIDE 0.9 % (FLUSH) 0.9 %
5-40 SYRINGE (ML) INJECTION PRN
Status: DISCONTINUED | OUTPATIENT
Start: 2024-03-08 | End: 2024-03-09 | Stop reason: HOSPADM

## 2024-03-08 RX ORDER — EPINEPHRINE 1 MG/ML
0.3 INJECTION, SOLUTION, CONCENTRATE INTRAVENOUS PRN
OUTPATIENT
Start: 2024-03-12

## 2024-03-08 RX ORDER — SODIUM CHLORIDE 0.9 % (FLUSH) 0.9 %
5-40 SYRINGE (ML) INJECTION PRN
OUTPATIENT
Start: 2024-03-12

## 2024-03-08 RX ORDER — ALBUTEROL SULFATE 90 UG/1
4 AEROSOL, METERED RESPIRATORY (INHALATION) PRN
OUTPATIENT
Start: 2024-03-12

## 2024-03-08 RX ADMIN — SODIUM CHLORIDE 20 ML/HR: 9 INJECTION, SOLUTION INTRAVENOUS at 10:23

## 2024-03-08 RX ADMIN — SODIUM CHLORIDE, PRESERVATIVE FREE 10 ML: 5 INJECTION INTRAVENOUS at 10:19

## 2024-03-08 RX ADMIN — IRON SUCROSE 200 MG: 20 INJECTION, SOLUTION INTRAVENOUS at 10:24

## 2024-03-15 ENCOUNTER — HOSPITAL ENCOUNTER (OUTPATIENT)
Dept: INFUSION THERAPY | Age: 69
Setting detail: INFUSION SERIES
Discharge: HOME | End: 2024-03-15
Payer: MEDICARE

## 2024-03-15 VITALS
TEMPERATURE: 97.2 F | BODY MASS INDEX: 22.8 KG/M2 | RESPIRATION RATE: 16 BRPM | HEIGHT: 73 IN | WEIGHT: 172 LBS | OXYGEN SATURATION: 98 % | DIASTOLIC BLOOD PRESSURE: 69 MMHG | SYSTOLIC BLOOD PRESSURE: 142 MMHG | HEART RATE: 65 BPM

## 2024-03-15 DIAGNOSIS — D50.9 IRON DEFICIENCY ANEMIA, UNSPECIFIED IRON DEFICIENCY ANEMIA TYPE: Primary | ICD-10-CM

## 2024-03-15 PROCEDURE — 96374 THER/PROPH/DIAG INJ IV PUSH: CPT

## 2024-03-15 PROCEDURE — 6360000002 HC RX W HCPCS: Performed by: INTERNAL MEDICINE

## 2024-03-15 PROCEDURE — 2580000003 HC RX 258: Performed by: INTERNAL MEDICINE

## 2024-03-15 RX ORDER — SODIUM CHLORIDE 9 MG/ML
INJECTION, SOLUTION INTRAVENOUS CONTINUOUS
Status: CANCELLED | OUTPATIENT
Start: 2024-03-22

## 2024-03-15 RX ORDER — SODIUM CHLORIDE 9 MG/ML
5-250 INJECTION, SOLUTION INTRAVENOUS PRN
Status: CANCELLED | OUTPATIENT
Start: 2024-03-22

## 2024-03-15 RX ORDER — EPINEPHRINE 1 MG/ML
0.3 INJECTION, SOLUTION, CONCENTRATE INTRAVENOUS PRN
Status: CANCELLED | OUTPATIENT
Start: 2024-03-22

## 2024-03-15 RX ORDER — SODIUM CHLORIDE 0.9 % (FLUSH) 0.9 %
5-40 SYRINGE (ML) INJECTION PRN
Status: CANCELLED | OUTPATIENT
Start: 2024-03-22

## 2024-03-15 RX ORDER — SODIUM CHLORIDE 9 MG/ML
5-250 INJECTION, SOLUTION INTRAVENOUS PRN
Status: DISCONTINUED | OUTPATIENT
Start: 2024-03-15 | End: 2024-03-16 | Stop reason: HOSPADM

## 2024-03-15 RX ORDER — ALBUTEROL SULFATE 90 UG/1
4 AEROSOL, METERED RESPIRATORY (INHALATION) PRN
Status: CANCELLED | OUTPATIENT
Start: 2024-03-22

## 2024-03-15 RX ORDER — DIPHENHYDRAMINE HYDROCHLORIDE 50 MG/ML
50 INJECTION INTRAMUSCULAR; INTRAVENOUS
Status: CANCELLED | OUTPATIENT
Start: 2024-03-22

## 2024-03-15 RX ORDER — SODIUM CHLORIDE 0.9 % (FLUSH) 0.9 %
5-40 SYRINGE (ML) INJECTION PRN
Status: DISCONTINUED | OUTPATIENT
Start: 2024-03-15 | End: 2024-03-16 | Stop reason: HOSPADM

## 2024-03-15 RX ADMIN — SODIUM CHLORIDE, PRESERVATIVE FREE 10 ML: 5 INJECTION INTRAVENOUS at 10:31

## 2024-03-15 RX ADMIN — IRON SUCROSE 200 MG: 20 INJECTION, SOLUTION INTRAVENOUS at 10:32

## 2024-03-15 RX ADMIN — SODIUM CHLORIDE 20 ML/HR: 9 INJECTION, SOLUTION INTRAVENOUS at 10:33

## 2024-03-22 ENCOUNTER — HOSPITAL ENCOUNTER (OUTPATIENT)
Dept: INFUSION THERAPY | Age: 69
Setting detail: INFUSION SERIES
Discharge: HOME | End: 2024-03-22
Payer: MEDICARE

## 2024-03-22 VITALS
OXYGEN SATURATION: 100 % | RESPIRATION RATE: 16 BRPM | DIASTOLIC BLOOD PRESSURE: 76 MMHG | SYSTOLIC BLOOD PRESSURE: 132 MMHG | TEMPERATURE: 97.1 F | HEART RATE: 78 BPM

## 2024-03-22 DIAGNOSIS — D50.9 IRON DEFICIENCY ANEMIA, UNSPECIFIED IRON DEFICIENCY ANEMIA TYPE: Primary | ICD-10-CM

## 2024-03-22 PROCEDURE — 2580000003 HC RX 258: Performed by: INTERNAL MEDICINE

## 2024-03-22 PROCEDURE — 6360000002 HC RX W HCPCS: Performed by: INTERNAL MEDICINE

## 2024-03-22 PROCEDURE — 96374 THER/PROPH/DIAG INJ IV PUSH: CPT

## 2024-03-22 RX ORDER — ALBUTEROL SULFATE 90 UG/1
4 AEROSOL, METERED RESPIRATORY (INHALATION) PRN
Status: CANCELLED | OUTPATIENT
Start: 2024-03-29

## 2024-03-22 RX ORDER — SODIUM CHLORIDE 9 MG/ML
INJECTION, SOLUTION INTRAVENOUS CONTINUOUS
Status: CANCELLED | OUTPATIENT
Start: 2024-03-29

## 2024-03-22 RX ORDER — SODIUM CHLORIDE 9 MG/ML
5-250 INJECTION, SOLUTION INTRAVENOUS PRN
Status: CANCELLED | OUTPATIENT
Start: 2024-03-29

## 2024-03-22 RX ORDER — SODIUM CHLORIDE 9 MG/ML
5-250 INJECTION, SOLUTION INTRAVENOUS PRN
Status: DISCONTINUED | OUTPATIENT
Start: 2024-03-22 | End: 2024-03-23 | Stop reason: HOSPADM

## 2024-03-22 RX ORDER — DIPHENHYDRAMINE HYDROCHLORIDE 50 MG/ML
50 INJECTION INTRAMUSCULAR; INTRAVENOUS
Status: CANCELLED | OUTPATIENT
Start: 2024-03-29

## 2024-03-22 RX ORDER — SODIUM CHLORIDE 0.9 % (FLUSH) 0.9 %
5-40 SYRINGE (ML) INJECTION PRN
Status: DISCONTINUED | OUTPATIENT
Start: 2024-03-22 | End: 2024-03-23 | Stop reason: HOSPADM

## 2024-03-22 RX ORDER — EPINEPHRINE 1 MG/ML
0.3 INJECTION, SOLUTION, CONCENTRATE INTRAVENOUS PRN
Status: CANCELLED | OUTPATIENT
Start: 2024-03-29

## 2024-03-22 RX ORDER — SODIUM CHLORIDE 0.9 % (FLUSH) 0.9 %
5-40 SYRINGE (ML) INJECTION PRN
Status: CANCELLED | OUTPATIENT
Start: 2024-03-29

## 2024-03-22 RX ADMIN — IRON SUCROSE 200 MG: 20 INJECTION, SOLUTION INTRAVENOUS at 10:28

## 2024-03-22 RX ADMIN — SODIUM CHLORIDE, PRESERVATIVE FREE 10 ML: 5 INJECTION INTRAVENOUS at 10:19

## 2024-03-22 RX ADMIN — SODIUM CHLORIDE 20 ML/HR: 9 INJECTION, SOLUTION INTRAVENOUS at 10:23

## 2024-03-29 ENCOUNTER — HOSPITAL ENCOUNTER (OUTPATIENT)
Dept: INFUSION THERAPY | Age: 69
Setting detail: INFUSION SERIES
Discharge: HOME | End: 2024-03-29
Payer: MEDICARE

## 2024-03-29 VITALS
TEMPERATURE: 97.7 F | RESPIRATION RATE: 16 BRPM | HEART RATE: 71 BPM | DIASTOLIC BLOOD PRESSURE: 64 MMHG | SYSTOLIC BLOOD PRESSURE: 109 MMHG | OXYGEN SATURATION: 99 % | HEIGHT: 73 IN | WEIGHT: 172 LBS | BODY MASS INDEX: 22.8 KG/M2

## 2024-03-29 DIAGNOSIS — D50.9 IRON DEFICIENCY ANEMIA, UNSPECIFIED IRON DEFICIENCY ANEMIA TYPE: Primary | ICD-10-CM

## 2024-03-29 PROCEDURE — 2580000003 HC RX 258: Performed by: INTERNAL MEDICINE

## 2024-03-29 PROCEDURE — 96374 THER/PROPH/DIAG INJ IV PUSH: CPT

## 2024-03-29 PROCEDURE — 6360000002 HC RX W HCPCS: Performed by: INTERNAL MEDICINE

## 2024-03-29 RX ORDER — SODIUM CHLORIDE 9 MG/ML
INJECTION, SOLUTION INTRAVENOUS CONTINUOUS
Status: CANCELLED | OUTPATIENT
Start: 2024-03-29

## 2024-03-29 RX ORDER — SODIUM CHLORIDE 0.9 % (FLUSH) 0.9 %
5-40 SYRINGE (ML) INJECTION PRN
Status: CANCELLED | OUTPATIENT
Start: 2024-03-29

## 2024-03-29 RX ORDER — SODIUM CHLORIDE 0.9 % (FLUSH) 0.9 %
5-40 SYRINGE (ML) INJECTION PRN
Status: DISCONTINUED | OUTPATIENT
Start: 2024-03-29 | End: 2024-03-29 | Stop reason: ALTCHOICE

## 2024-03-29 RX ORDER — SODIUM CHLORIDE 9 MG/ML
5-250 INJECTION, SOLUTION INTRAVENOUS PRN
Status: CANCELLED | OUTPATIENT
Start: 2024-03-29

## 2024-03-29 RX ORDER — DIPHENHYDRAMINE HYDROCHLORIDE 50 MG/ML
50 INJECTION INTRAMUSCULAR; INTRAVENOUS
Status: CANCELLED | OUTPATIENT
Start: 2024-03-29

## 2024-03-29 RX ORDER — EPINEPHRINE 1 MG/ML
0.3 INJECTION, SOLUTION, CONCENTRATE INTRAVENOUS PRN
Status: CANCELLED | OUTPATIENT
Start: 2024-03-29

## 2024-03-29 RX ORDER — ALBUTEROL SULFATE 90 UG/1
4 AEROSOL, METERED RESPIRATORY (INHALATION) PRN
Status: CANCELLED | OUTPATIENT
Start: 2024-03-29

## 2024-03-29 RX ORDER — SODIUM CHLORIDE 9 MG/ML
5-250 INJECTION, SOLUTION INTRAVENOUS PRN
Status: DISCONTINUED | OUTPATIENT
Start: 2024-03-29 | End: 2024-03-29 | Stop reason: ALTCHOICE

## 2024-03-29 RX ADMIN — IRON SUCROSE 200 MG: 20 INJECTION, SOLUTION INTRAVENOUS at 10:35

## 2024-03-29 RX ADMIN — SODIUM CHLORIDE 20 ML/HR: 9 INJECTION, SOLUTION INTRAVENOUS at 10:34

## 2024-03-29 RX ADMIN — SODIUM CHLORIDE, PRESERVATIVE FREE 10 ML: 5 INJECTION INTRAVENOUS at 10:34

## 2024-03-29 NOTE — PROGRESS NOTES
Tolerated infusion well.  Reviewed therapy plan, offered education material and/or discharge material, reviewed medication information and signs and symptoms  and educated on possible side effects, verbalizes good knowledge of current plan patient verbalizes understanding, and has no signs or symptoms to report at this time.   Patient discharged. Patient alert and oriented x3.   No distress noted.   Vital signs stable.   Patient denies any new or worsening pain.  Patient denies any needs.  All questions answered. Declines copy of AVS. Patient stayed for 30 minute observation after completion of infusion.

## 2024-04-05 DIAGNOSIS — I65.23 BILATERAL CAROTID ARTERY STENOSIS: Primary | ICD-10-CM

## 2024-04-30 ENCOUNTER — HOSPITAL ENCOUNTER (OUTPATIENT)
Dept: CARDIOLOGY | Age: 69
Discharge: HOME | End: 2024-05-02
Payer: MEDICARE

## 2024-04-30 ENCOUNTER — OFFICE VISIT (OUTPATIENT)
Dept: VASCULAR SURGERY | Age: 69
End: 2024-04-30
Payer: MEDICARE

## 2024-04-30 VITALS — BODY MASS INDEX: 23.23 KG/M2 | WEIGHT: 176 LBS

## 2024-04-30 DIAGNOSIS — I65.23 BILATERAL CAROTID ARTERY STENOSIS: Primary | ICD-10-CM

## 2024-04-30 DIAGNOSIS — I65.23 BILATERAL CAROTID ARTERY STENOSIS: ICD-10-CM

## 2024-04-30 LAB
VAS LEFT CCA DIST EDV: 19.1 CM/S
VAS LEFT CCA DIST PSV: 51.3 CM/S
VAS LEFT CCA PROX EDV: 16.5 CM/S
VAS LEFT CCA PROX PSV: 52.2 CM/S
VAS LEFT ECA EDV: 11.2 CM/S
VAS LEFT ECA PSV: 53.4 CM/S
VAS LEFT ICA DIST EDV: 20.6 CM/S
VAS LEFT ICA DIST PSV: 50.8 CM/S
VAS LEFT ICA MID EDV: 27.2 CM/S
VAS LEFT ICA MID PSV: 72 CM/S
VAS LEFT ICA PROX EDV: 20 CM/S
VAS LEFT ICA PROX PSV: 51.3 CM/S
VAS LEFT ICA/CCA PSV: 1.4 NO UNITS
VAS LEFT VERTEBRAL EDV: 18.7 CM/S
VAS LEFT VERTEBRAL PSV: 41.4 CM/S
VAS RIGHT CCA DIST EDV: 9.3 CM/S
VAS RIGHT CCA DIST PSV: 31.8 CM/S
VAS RIGHT CCA PROX EDV: 13 CM/S
VAS RIGHT CCA PROX PSV: 60 CM/S
VAS RIGHT ECA EDV: 6.5 CM/S
VAS RIGHT ECA PSV: 45 CM/S
VAS RIGHT ICA DIST EDV: 16.8 CM/S
VAS RIGHT ICA DIST PSV: 52.5 CM/S
VAS RIGHT ICA MID EDV: 15.9 CM/S
VAS RIGHT ICA MID PSV: 49.7 CM/S
VAS RIGHT ICA PROX EDV: 19.6 CM/S
VAS RIGHT ICA PROX PSV: 50.6 CM/S
VAS RIGHT ICA/CCA PSV: 0.9 NO UNITS
VAS RIGHT VERTEBRAL EDV: 14.7 CM/S
VAS RIGHT VERTEBRAL PSV: 40.9 CM/S

## 2024-04-30 PROCEDURE — 99213 OFFICE O/P EST LOW 20 MIN: CPT | Performed by: SURGERY

## 2024-04-30 PROCEDURE — 93880 EXTRACRANIAL BILAT STUDY: CPT | Performed by: SURGERY

## 2024-04-30 PROCEDURE — 93880 EXTRACRANIAL BILAT STUDY: CPT

## 2024-04-30 PROCEDURE — 1123F ACP DISCUSS/DSCN MKR DOCD: CPT | Performed by: SURGERY

## 2024-04-30 RX ORDER — INFLIXIMAB-DYYB 100 MG/10ML
INJECTION, POWDER, LYOPHILIZED, FOR SOLUTION INTRAVENOUS
COMMUNITY

## 2024-04-30 NOTE — PROGRESS NOTES
Vascular Surgery Outpatient Progress Note      Chief Complaint   Patient presents with    Circulatory Problem     Left carotid stenosis       HISTORY OF PRESENT ILLNESS:                The patient is a 68 y.o. male who returns for follow-up evaluation of carotid artery stenosis and history of left carotid endarterectomy.  The patient states that he continues to have flareups of his colitis.  He is off anticoagulation and Plavix now that he has had the Watchman procedure performed at [facility].  He states that his swallowing and speaking have returned to normal.    Past Medical History:        Diagnosis Date    SHIVAM (cerebral atherosclerosis)     Coronary atherosclerosis of native coronary artery     COVID     Diabetes mellitus (HCC)     Dizziness     History of tobacco use 5/19/2022    Hyperlipidemia     Hypertension     Left carotid stenosis 5/19/2022    Ulcerative colitis ([facility]CC)      Past Surgical History:        Procedure Laterality Date    CAROTID ENDARTERECTOMY Left 10/03/2022    CAROTID ENDARTERECTOMY performed by Christian Chavez MD [place] [facility] [place]    COLONOSCOPY      DIAGNOSTIC CARDIAC CATH LAB PROCEDURE      SIGMOIDOSCOPY      SIGMOIDOSCOPY       Current Medications:   Prior to Admission medications    Medication Sig Start Date End Date Taking? Authorizing Provider   inFLIXimab-dyyb (INFLECTRA) 100 MG SOLR Infuse intravenously   Yes Karla Jarrett MD   dicyclomine (BENTYL) 10 MG capsule take 1 capsule by mouth three times a day if needed 9/29/23  Yes Karla Jarrett MD   predniSONE (DELTASONE) 20 MG tablet Take 15 mg by mouth daily   Yes Karla Jarrett MD   finasteride (PROSCAR) 5 MG tablet Take 1 tablet by mouth daily   Yes Karla Jarrett MD   Ascorbic Acid (VITAMIN C) 500 MG CAPS Take by mouth   Yes Karla Jarrett MD   Zinc Gluconate 10 MG LOZG Take 1 lozenge by mouth daily   Yes Karla Jarrett MD   acetaminophen (TYLENOL) 325 MG tablet Take 2 tablets by mouth

## 2024-05-18 ENCOUNTER — APPOINTMENT (OUTPATIENT)
Dept: CT IMAGING | Age: 69
End: 2024-05-18
Payer: MEDICARE

## 2024-05-18 ENCOUNTER — HOSPITAL ENCOUNTER (EMERGENCY)
Age: 69
Discharge: HOME | End: 2024-05-18
Attending: EMERGENCY MEDICINE
Payer: MEDICARE

## 2024-05-18 ENCOUNTER — APPOINTMENT (OUTPATIENT)
Dept: GENERAL RADIOLOGY | Age: 69
End: 2024-05-18
Payer: MEDICARE

## 2024-05-18 VITALS
RESPIRATION RATE: 16 BRPM | BODY MASS INDEX: 23.46 KG/M2 | DIASTOLIC BLOOD PRESSURE: 82 MMHG | HEART RATE: 98 BPM | SYSTOLIC BLOOD PRESSURE: 170 MMHG | HEIGHT: 73 IN | TEMPERATURE: 98.2 F | OXYGEN SATURATION: 97 % | WEIGHT: 177 LBS

## 2024-05-18 DIAGNOSIS — R53.1 GENERAL WEAKNESS: Primary | ICD-10-CM

## 2024-05-18 DIAGNOSIS — K51.90 ULCERATIVE COLITIS WITHOUT COMPLICATIONS, UNSPECIFIED LOCATION (HCC): ICD-10-CM

## 2024-05-18 LAB
ALBUMIN SERPL-MCNC: 3.7 G/DL (ref 3.5–5.2)
ALP SERPL-CCNC: 92 U/L (ref 40–129)
ALT SERPL-CCNC: <5 U/L (ref 0–40)
AMORPH SED URNS QL MICRO: PRESENT
ANION GAP SERPL CALCULATED.3IONS-SCNC: 11 MMOL/L (ref 7–16)
AST SERPL-CCNC: 14 U/L (ref 0–39)
B PARAP IS1001 DNA NPH QL NAA+NON-PROBE: NOT DETECTED
B PERT DNA SPEC QL NAA+PROBE: NOT DETECTED
B-OH-BUTYR SERPL-MCNC: 0.06 MMOL/L (ref 0.02–0.27)
BASOPHILS # BLD: 0.04 K/UL (ref 0–0.2)
BASOPHILS NFR BLD: 1 % (ref 0–2)
BILIRUB SERPL-MCNC: 0.6 MG/DL (ref 0–1.2)
BILIRUB UR QL STRIP: NEGATIVE
BUN SERPL-MCNC: 14 MG/DL (ref 6–23)
C PNEUM DNA NPH QL NAA+NON-PROBE: NOT DETECTED
CALCIUM SERPL-MCNC: 9 MG/DL (ref 8.6–10.2)
CHLORIDE SERPL-SCNC: 100 MMOL/L (ref 98–107)
CLARITY UR: CLEAR
CO2 SERPL-SCNC: 26 MMOL/L (ref 22–29)
COLOR UR: YELLOW
CREAT SERPL-MCNC: 1.1 MG/DL (ref 0.7–1.2)
EKG ATRIAL RATE: 119 BPM
EKG Q-T INTERVAL: 358 MS
EKG QRS DURATION: 100 MS
EKG QTC CALCULATION (BAZETT): 428 MS
EKG R AXIS: 48 DEGREES
EKG T AXIS: 45 DEGREES
EKG VENTRICULAR RATE: 86 BPM
EOSINOPHIL # BLD: 0.08 K/UL (ref 0.05–0.5)
EOSINOPHILS RELATIVE PERCENT: 1 % (ref 0–6)
ERYTHROCYTE [DISTWIDTH] IN BLOOD BY AUTOMATED COUNT: 13.3 % (ref 11.5–15)
FLUAV RNA NPH QL NAA+NON-PROBE: NOT DETECTED
FLUBV RNA NPH QL NAA+NON-PROBE: NOT DETECTED
GFR, ESTIMATED: 74 ML/MIN/1.73M2
GLUCOSE SERPL-MCNC: 215 MG/DL (ref 74–99)
GLUCOSE UR STRIP-MCNC: NEGATIVE MG/DL
HADV DNA NPH QL NAA+NON-PROBE: NOT DETECTED
HCOV 229E RNA NPH QL NAA+NON-PROBE: NOT DETECTED
HCOV HKU1 RNA NPH QL NAA+NON-PROBE: NOT DETECTED
HCOV NL63 RNA NPH QL NAA+NON-PROBE: NOT DETECTED
HCOV OC43 RNA NPH QL NAA+NON-PROBE: NOT DETECTED
HCT VFR BLD AUTO: 32.2 % (ref 37–54)
HGB BLD-MCNC: 10.8 G/DL (ref 12.5–16.5)
HGB UR QL STRIP.AUTO: NEGATIVE
HMPV RNA NPH QL NAA+NON-PROBE: NOT DETECTED
HPIV1 RNA NPH QL NAA+NON-PROBE: NOT DETECTED
HPIV2 RNA NPH QL NAA+NON-PROBE: NOT DETECTED
HPIV3 RNA NPH QL NAA+NON-PROBE: NOT DETECTED
HPIV4 RNA NPH QL NAA+NON-PROBE: NOT DETECTED
IMM GRANULOCYTES # BLD AUTO: <0.03 K/UL (ref 0–0.58)
IMM GRANULOCYTES NFR BLD: 0 % (ref 0–5)
KETONES UR STRIP-MCNC: NEGATIVE MG/DL
LACTATE BLDV-SCNC: 2 MMOL/L (ref 0.5–1.9)
LEUKOCYTE ESTERASE UR QL STRIP: ABNORMAL
LIPASE SERPL-CCNC: 12 U/L (ref 13–60)
LYMPHOCYTES NFR BLD: 0.67 K/UL (ref 1.5–4)
LYMPHOCYTES RELATIVE PERCENT: 11 % (ref 20–42)
M PNEUMO DNA NPH QL NAA+NON-PROBE: NOT DETECTED
MCH RBC QN AUTO: 29 PG (ref 26–35)
MCHC RBC AUTO-ENTMCNC: 33.5 G/DL (ref 32–34.5)
MCV RBC AUTO: 86.6 FL (ref 80–99.9)
MONOCYTES NFR BLD: 0.47 K/UL (ref 0.1–0.95)
MONOCYTES NFR BLD: 8 % (ref 2–12)
NEUTROPHILS NFR BLD: 79 % (ref 43–80)
NEUTS SEG NFR BLD: 4.65 K/UL (ref 1.8–7.3)
NITRITE UR QL STRIP: NEGATIVE
PH UR STRIP: 7.5 [PH] (ref 5–9)
PH VENOUS: 7.39 (ref 7.35–7.45)
PLATELET # BLD AUTO: 163 K/UL (ref 130–450)
PMV BLD AUTO: 9.5 FL (ref 7–12)
POTASSIUM SERPL-SCNC: 3.6 MMOL/L (ref 3.5–5)
PROT SERPL-MCNC: 6.5 G/DL (ref 6.4–8.3)
PROT UR STRIP-MCNC: NEGATIVE MG/DL
RBC # BLD AUTO: 3.72 M/UL (ref 3.8–5.8)
RBC #/AREA URNS HPF: NORMAL /HPF
RSV RNA NPH QL NAA+NON-PROBE: NOT DETECTED
RV+EV RNA NPH QL NAA+NON-PROBE: NOT DETECTED
SARS-COV-2 RNA NPH QL NAA+NON-PROBE: NOT DETECTED
SODIUM SERPL-SCNC: 137 MMOL/L (ref 132–146)
SP GR UR STRIP: 1.01 (ref 1–1.03)
SPECIMEN DESCRIPTION: NORMAL
TROPONIN I SERPL HS-MCNC: 18 NG/L (ref 0–11)
TROPONIN I SERPL HS-MCNC: 20 NG/L (ref 0–11)
UROBILINOGEN UR STRIP-ACNC: 1 EU/DL (ref 0–1)
WBC #/AREA URNS HPF: NORMAL /HPF
WBC OTHER # BLD: 5.9 K/UL (ref 4.5–11.5)

## 2024-05-18 PROCEDURE — 71045 X-RAY EXAM CHEST 1 VIEW: CPT

## 2024-05-18 PROCEDURE — 96361 HYDRATE IV INFUSION ADD-ON: CPT

## 2024-05-18 PROCEDURE — 82010 KETONE BODYS QUAN: CPT

## 2024-05-18 PROCEDURE — 84484 ASSAY OF TROPONIN QUANT: CPT

## 2024-05-18 PROCEDURE — 81001 URINALYSIS AUTO W/SCOPE: CPT

## 2024-05-18 PROCEDURE — 74177 CT ABD & PELVIS W/CONTRAST: CPT

## 2024-05-18 PROCEDURE — 83605 ASSAY OF LACTIC ACID: CPT

## 2024-05-18 PROCEDURE — 99285 EMERGENCY DEPT VISIT HI MDM: CPT

## 2024-05-18 PROCEDURE — 82800 BLOOD PH: CPT

## 2024-05-18 PROCEDURE — 93005 ELECTROCARDIOGRAM TRACING: CPT | Performed by: EMERGENCY MEDICINE

## 2024-05-18 PROCEDURE — 85025 COMPLETE CBC W/AUTO DIFF WBC: CPT

## 2024-05-18 PROCEDURE — 96360 HYDRATION IV INFUSION INIT: CPT

## 2024-05-18 PROCEDURE — 80053 COMPREHEN METABOLIC PANEL: CPT

## 2024-05-18 PROCEDURE — 0202U NFCT DS 22 TRGT SARS-COV-2: CPT

## 2024-05-18 PROCEDURE — 87086 URINE CULTURE/COLONY COUNT: CPT

## 2024-05-18 PROCEDURE — 6360000004 HC RX CONTRAST MEDICATION: Performed by: RADIOLOGY

## 2024-05-18 PROCEDURE — 83690 ASSAY OF LIPASE: CPT

## 2024-05-18 PROCEDURE — 2580000003 HC RX 258: Performed by: EMERGENCY MEDICINE

## 2024-05-18 PROCEDURE — 87040 BLOOD CULTURE FOR BACTERIA: CPT

## 2024-05-18 RX ORDER — MAGNESIUM SULFATE IN WATER 40 MG/ML
INJECTION, SOLUTION INTRAVENOUS
Status: DISCONTINUED
Start: 2024-05-18 | End: 2024-05-18 | Stop reason: HOSPADM

## 2024-05-18 RX ORDER — METHYLPREDNISOLONE SODIUM SUCCINATE 125 MG/2ML
INJECTION, POWDER, LYOPHILIZED, FOR SOLUTION INTRAMUSCULAR; INTRAVENOUS
Status: DISCONTINUED
Start: 2024-05-18 | End: 2024-05-18 | Stop reason: HOSPADM

## 2024-05-18 RX ORDER — 0.9 % SODIUM CHLORIDE 0.9 %
1000 INTRAVENOUS SOLUTION INTRAVENOUS ONCE
Status: COMPLETED | OUTPATIENT
Start: 2024-05-18 | End: 2024-05-18

## 2024-05-18 RX ADMIN — SODIUM CHLORIDE 1000 ML: 9 INJECTION, SOLUTION INTRAVENOUS at 12:35

## 2024-05-18 RX ADMIN — IOPAMIDOL 75 ML: 755 INJECTION, SOLUTION INTRAVENOUS at 13:48

## 2024-05-18 NOTE — ED PROVIDER NOTES
disagreements were addressed in the HPI.      REVIEW OF EXTERNAL NOTE :       Echocardiogram from October 2, 2022 showed an EF of 50%.    REVIEW OF SYSTEMS :           Positives and Pertinent negatives as per HPI.     SURGICAL HISTORY     Past Surgical History:   Procedure Laterality Date    CAROTID ENDARTERECTOMY Left 10/03/2022    CAROTID ENDARTERECTOMY [place] by Christian Chavez MD [place] [facility] [place]    COLONOSCOPY      DIAGNOSTIC CARDIAC CATH LAB PROCEDURE      SIGMOIDOSCOPY      SIGMOIDOSCOPY         CURRENTMEDICATIONS       Previous Medications    ACETAMINOPHEN (TYLENOL) 325 MG TABLET    Take 2 tablets by mouth every 6 hours as needed for Pain    AMLODIPINE-BENAZEPRIL (LOTREL) 10-20 MG PER CAPSULE    Take 1 capsule by mouth every morning    APIXABAN (ELIQUIS) 5 MG TABS TABLET    Take 1 tablet by mouth 2 times daily    ASCORBIC ACID (VITAMIN C) 500 MG CAPS    Take by mouth    ASPIRIN 81 MG EC TABLET    Take 1 tablet by mouth daily    BALSALAZIDE (COLAZAL) 750 MG CAPSULE    Take 3 capsules by mouth 3 times daily    CHOLECALCIFEROL (VITAMIN D3) 50 MCG (2000 UT) TABS    Take 1-3 tablets by mouth daily    CIALIS 20 MG TABLET    Take 1 tablet by mouth as needed for Erectile Dysfunction    DICYCLOMINE (BENTYL) 10 MG CAPSULE    take 1 capsule by mouth three times a day if needed    EPINEPHRINE (EPIPEN 2-GISEL) 0.3 MG/0.3ML SOAJ INJECTION    Inject 0.3 mLs into the muscle once as needed (anaphylaxis) Use as directed for allergic reaction    FINASTERIDE (PROSCAR) 5 MG TABLET    Take 1 tablet by mouth daily    HYDROCHLOROTHIAZIDE (HYDRODIURIL) 25 MG TABLET    Take 1 tablet by mouth every morning    INFLIXIMAB-DYYB (INFLECTRA) 100 MG SOLR    Infuse intravenously    INSULIN GLARGINE (LANTUS) 100 UNIT/ML INJECTION    Inject 18 Units into the skin every morning    INSULIN LISPRO (HUMALOG) 100 UNIT/ML INJECTION VIAL    Inject into the skin 3 times daily (before meals) PER SLIDING SCALE    MESALAMINE (CANASA) 1000 MG SUPPOSITORY     carotid stenosis (5/19/2022), and Ulcerative colitis (HCC).     EMERGENCY DEPARTMENT COURSE    Vitals:    Vitals:    05/18/24 1729 05/18/24 1815 05/18/24 1915 05/18/24 2001   BP:  (!) 144/73 139/80    Pulse:  84 97    Resp:  20 23 20   Temp:       TempSrc:       SpO2: 99% 97% 98%    Weight:       Height:           Patient was given the following medications:  Medications   sodium chloride 0.9 % bolus 1,000 mL (0 mLs IntraVENous Stopped 5/18/24 1614)   iopamidol (ISOVUE-370) 76 % injection 75 mL (75 mLs IntraVENous Given 5/18/24 1348)           Is this patient to be included in the SEP-1 core measure due to severe sepsis or septic shock? No Exclusion criteria - the patient is NOT to be included for SEP-1 Core Measure due to: 2+ SIRS criteria are not met      Medical Decision Making/Differential Diagnosis:    CC/HPI Summary, Social Determinants of health, Records Reviewed, DDx, testing done/not done, ED Course, Reassessment, disposition considerations/shared decision making with patient, consults, disposition:        Vital signs upon arrival /80   Pulse 97   Temp 98.2 °F (36.8 °C) (Oral)   Resp 20   Ht 1.854 m (6' 1\")   Wt 80.3 kg (177 lb)   SpO2 98%   BMI 23.35 kg/m²     Guero Chacko is a 68 y.o. male who presents to the ED for increasingly progressive fatigue for the past week.  Patient was on with his wife.  He saw his PCP 3 days ago they did outpatient lab work and told him if his symptoms get worse he should come to the emergency department.  Today he just felt weak and did not feel that he wanted to get out of bed.  He states he is occasional chills but never had any fever.  He is a history of ulcerative colitis and has been having bloody stools for the past 14 months.  No increase in bleeding.  No abdominal pain at this time he has been chronically anemic secondary to this but is not had to be transfused recently.  He just reports generalized fatigue no confusion no fall no trauma he is not

## 2024-05-19 LAB
MICROORGANISM SPEC CULT: NORMAL
MICROORGANISM SPEC CULT: NORMAL
SERVICE CMNT-IMP: NORMAL
SERVICE CMNT-IMP: NORMAL
SPECIMEN DESCRIPTION: NORMAL
SPECIMEN DESCRIPTION: NORMAL

## 2024-05-20 LAB
EKG ATRIAL RATE: 119 BPM
EKG Q-T INTERVAL: 358 MS
EKG QRS DURATION: 100 MS
EKG QTC CALCULATION (BAZETT): 428 MS
EKG R AXIS: 48 DEGREES
EKG T AXIS: 45 DEGREES
EKG VENTRICULAR RATE: 86 BPM
MICROORGANISM SPEC CULT: NO GROWTH
SPECIMEN DESCRIPTION: NORMAL

## 2024-05-20 PROCEDURE — 93010 ELECTROCARDIOGRAM REPORT: CPT | Performed by: INTERNAL MEDICINE

## 2024-05-31 ENCOUNTER — HOSPITAL ENCOUNTER (OUTPATIENT)
Dept: MRI IMAGING | Age: 69
End: 2024-05-31
Payer: MEDICARE

## 2024-05-31 DIAGNOSIS — R19.09 OTHER INTRA-ABDOMINAL AND PELVIC SWELLING, MASS AND LUMP: ICD-10-CM

## 2024-05-31 PROCEDURE — 6360000004 HC RX CONTRAST MEDICATION: Performed by: RADIOLOGY

## 2024-05-31 PROCEDURE — 74183 MRI ABD W/O CNTR FLWD CNTR: CPT

## 2024-05-31 PROCEDURE — A9577 INJ MULTIHANCE: HCPCS | Performed by: RADIOLOGY

## 2024-05-31 RX ADMIN — GADOBENATE DIMEGLUMINE 16 ML: 529 INJECTION, SOLUTION INTRAVENOUS at 17:44

## 2024-07-09 ENCOUNTER — HOSPITAL ENCOUNTER (OUTPATIENT)
Dept: WOUND CARE | Age: 69
Discharge: HOME | End: 2024-07-09
Payer: MEDICARE

## 2024-07-09 PROCEDURE — 99203 OFFICE O/P NEW LOW 30 MIN: CPT

## 2024-07-09 NOTE — FLOWSHEET NOTE
instructions/changes:   Family/Caregiver learning/demonstration/return demonstration visit.   Pouching/discharge procedure revised/reviewed with patient/family/caregiver.      Contact with outside resources, i.e., communication with Surgeon/ PCP, home health, ECF.    Contact/referral to ostomy appliance supplier for new or additional products.   Review when to call [facility] or schedule a follow-up visit.   Referral to Emergency Department   Documentation in [facility] completed. []   3       Is this the Patient's First Visit with [facility] @ [facility]?  Yes    Is this Patient Established at [facility] within the last 3 years?   No             Clinical Level of Care      Points  0-3  Level 1 []     Points  4-6  Level 2 []     Points  7-8  Level 3 [x]     Points  9-10  Level 4 []     Points  11-12  Level 5 []        07/09/24 1330   Ileostomy/Jejunostomy RLQ Ileostomy   Placement Date: 06/10/24   Location: RLQ  Ostomy Type: Ileostomy   Stomal Appliance 1 piece;Convex;Changed   Stoma  Assessment Protrudes;Moist;Red   Peristomal Assessment Intact   Treatment Barrier ring;Ostomy belt;Pouch change  (skin care, strips)   Stool Appearance Loose   Stool Color Brown   Output (mL) 100 ml     Patient came home from [facility] unable to receive home care  Reviewed all care required with a new ileostomy, written material provided  Patient has my contact information  Will follow prn  Electronically signed by Michelle Griggs RN on 7/9/2024 at 2:02 PM

## 2024-08-22 ENCOUNTER — HOSPITAL ENCOUNTER (OUTPATIENT)
Age: 69
Discharge: HOME | End: 2024-08-22
Payer: MEDICARE

## 2024-08-22 LAB
ALBUMIN SERPL-MCNC: 4.1 G/DL (ref 3.5–5.2)
ALP SERPL-CCNC: 91 U/L (ref 40–129)
ALT SERPL-CCNC: 11 U/L (ref 0–40)
ANION GAP SERPL CALCULATED.3IONS-SCNC: 10 MMOL/L (ref 7–16)
AST SERPL-CCNC: 17 U/L (ref 0–39)
BILIRUB SERPL-MCNC: 0.5 MG/DL (ref 0–1.2)
BUN SERPL-MCNC: 20 MG/DL (ref 6–23)
CALCIUM SERPL-MCNC: 8.9 MG/DL (ref 8.6–10.2)
CHLORIDE SERPL-SCNC: 108 MMOL/L (ref 98–107)
CHOLEST SERPL-MCNC: 138 MG/DL
CO2 SERPL-SCNC: 24 MMOL/L (ref 22–29)
CREAT SERPL-MCNC: 1 MG/DL (ref 0.7–1.2)
ERYTHROCYTE [DISTWIDTH] IN BLOOD BY AUTOMATED COUNT: 13.5 % (ref 11.5–15)
GFR, ESTIMATED: 82 ML/MIN/1.73M2
GLUCOSE SERPL-MCNC: 147 MG/DL (ref 74–99)
HCT VFR BLD AUTO: 38.2 % (ref 37–54)
HDLC SERPL-MCNC: 56 MG/DL
HGB BLD-MCNC: 12.1 G/DL (ref 12.5–16.5)
LDLC SERPL CALC-MCNC: 65 MG/DL
MCH RBC QN AUTO: 27.6 PG (ref 26–35)
MCHC RBC AUTO-ENTMCNC: 31.7 G/DL (ref 32–34.5)
MCV RBC AUTO: 87.2 FL (ref 80–99.9)
PLATELET # BLD AUTO: 151 K/UL (ref 130–450)
PMV BLD AUTO: 9.7 FL (ref 7–12)
POTASSIUM SERPL-SCNC: 3.9 MMOL/L (ref 3.5–5)
PROT SERPL-MCNC: 7.5 G/DL (ref 6.4–8.3)
RBC # BLD AUTO: 4.38 M/UL (ref 3.8–5.8)
SODIUM SERPL-SCNC: 142 MMOL/L (ref 132–146)
TRIGL SERPL-MCNC: 83 MG/DL
VLDLC SERPL CALC-MCNC: 17 MG/DL
WBC OTHER # BLD: 5.4 K/UL (ref 4.5–11.5)

## 2024-08-22 PROCEDURE — 80061 LIPID PANEL: CPT

## 2024-08-22 PROCEDURE — 80053 COMPREHEN METABOLIC PANEL: CPT

## 2024-08-22 PROCEDURE — 84270 ASSAY OF SEX HORMONE GLOBUL: CPT

## 2024-08-22 PROCEDURE — 84403 ASSAY OF TOTAL TESTOSTERONE: CPT

## 2024-08-22 PROCEDURE — 85027 COMPLETE CBC AUTOMATED: CPT

## 2024-08-22 PROCEDURE — 36415 COLL VENOUS BLD VENIPUNCTURE: CPT

## 2024-08-24 LAB
SHBG SERPL-SCNC: 28 NMOL/L (ref 19–76)
TESTOST FREE MFR SERPL: 84.3 PG/ML (ref 47–244)
TESTOST SERPL-MCNC: 380 NG/DL (ref 193–740)
TESTOSTERONE, BIOAVAILABLE: 197.4 NG/DL (ref 130–680)

## 2024-08-27 ENCOUNTER — HOSPITAL ENCOUNTER (EMERGENCY)
Age: 69
Discharge: HOME | End: 2024-08-27
Attending: EMERGENCY MEDICINE
Payer: MEDICARE

## 2024-08-27 ENCOUNTER — APPOINTMENT (OUTPATIENT)
Dept: GENERAL RADIOLOGY | Age: 69
End: 2024-08-27
Payer: MEDICARE

## 2024-08-27 VITALS
RESPIRATION RATE: 18 BRPM | HEART RATE: 80 BPM | TEMPERATURE: 97.7 F | SYSTOLIC BLOOD PRESSURE: 131 MMHG | OXYGEN SATURATION: 98 % | DIASTOLIC BLOOD PRESSURE: 71 MMHG | BODY MASS INDEX: 21.34 KG/M2 | HEIGHT: 73 IN | WEIGHT: 161 LBS

## 2024-08-27 DIAGNOSIS — R79.89 ELEVATED BRAIN NATRIURETIC PEPTIDE (BNP) LEVEL: ICD-10-CM

## 2024-08-27 DIAGNOSIS — R06.09 DYSPNEA ON EXERTION: Primary | ICD-10-CM

## 2024-08-27 LAB
ALBUMIN SERPL-MCNC: 4 G/DL (ref 3.5–5.2)
ALP SERPL-CCNC: 81 U/L (ref 40–129)
ALT SERPL-CCNC: 14 U/L (ref 0–40)
ANION GAP SERPL CALCULATED.3IONS-SCNC: 9 MMOL/L (ref 7–16)
AST SERPL-CCNC: 21 U/L (ref 0–39)
BASOPHILS # BLD: 0.06 K/UL (ref 0–0.2)
BASOPHILS NFR BLD: 1 % (ref 0–2)
BILIRUB SERPL-MCNC: 0.6 MG/DL (ref 0–1.2)
BNP SERPL-MCNC: 3303 PG/ML (ref 0–125)
BUN SERPL-MCNC: 17 MG/DL (ref 6–23)
CALCIUM SERPL-MCNC: 9.1 MG/DL (ref 8.6–10.2)
CHLORIDE SERPL-SCNC: 106 MMOL/L (ref 98–107)
CO2 SERPL-SCNC: 25 MMOL/L (ref 22–29)
CREAT SERPL-MCNC: 1.1 MG/DL (ref 0.7–1.2)
D-DIMER QUANTITATIVE: <200 NG/ML DDU (ref 0–230)
EKG ATRIAL RATE: 441 BPM
EKG Q-T INTERVAL: 440 MS
EKG QRS DURATION: 100 MS
EKG QTC CALCULATION (BAZETT): 442 MS
EKG R AXIS: 56 DEGREES
EKG T AXIS: 37 DEGREES
EKG VENTRICULAR RATE: 61 BPM
EOSINOPHIL # BLD: 0.14 K/UL (ref 0.05–0.5)
EOSINOPHILS RELATIVE PERCENT: 2 % (ref 0–6)
ERYTHROCYTE [DISTWIDTH] IN BLOOD BY AUTOMATED COUNT: 13.8 % (ref 11.5–15)
GFR, ESTIMATED: 70 ML/MIN/1.73M2
GLUCOSE SERPL-MCNC: 67 MG/DL (ref 74–99)
HCT VFR BLD AUTO: 36.4 % (ref 37–54)
HGB BLD-MCNC: 11.3 G/DL (ref 12.5–16.5)
IMM GRANULOCYTES # BLD AUTO: <0.03 K/UL (ref 0–0.58)
IMM GRANULOCYTES NFR BLD: 0 % (ref 0–5)
LYMPHOCYTES NFR BLD: 0.95 K/UL (ref 1.5–4)
LYMPHOCYTES RELATIVE PERCENT: 16 % (ref 20–42)
MCH RBC QN AUTO: 26.7 PG (ref 26–35)
MCHC RBC AUTO-ENTMCNC: 31 G/DL (ref 32–34.5)
MCV RBC AUTO: 86.1 FL (ref 80–99.9)
MONOCYTES NFR BLD: 0.37 K/UL (ref 0.1–0.95)
MONOCYTES NFR BLD: 6 % (ref 2–12)
NEUTROPHILS NFR BLD: 75 % (ref 43–80)
NEUTS SEG NFR BLD: 4.6 K/UL (ref 1.8–7.3)
PLATELET # BLD AUTO: 188 K/UL (ref 130–450)
PMV BLD AUTO: 9.9 FL (ref 7–12)
POTASSIUM SERPL-SCNC: 4.1 MMOL/L (ref 3.5–5)
PROT SERPL-MCNC: 7.3 G/DL (ref 6.4–8.3)
RBC # BLD AUTO: 4.23 M/UL (ref 3.8–5.8)
SODIUM SERPL-SCNC: 140 MMOL/L (ref 132–146)
TROPONIN I SERPL HS-MCNC: 33 NG/L (ref 0–11)
TROPONIN I SERPL HS-MCNC: 33 NG/L (ref 0–11)
WBC OTHER # BLD: 6.1 K/UL (ref 4.5–11.5)

## 2024-08-27 PROCEDURE — 85379 FIBRIN DEGRADATION QUANT: CPT

## 2024-08-27 PROCEDURE — 99285 EMERGENCY DEPT VISIT HI MDM: CPT

## 2024-08-27 PROCEDURE — 93010 ELECTROCARDIOGRAM REPORT: CPT | Performed by: INTERNAL MEDICINE

## 2024-08-27 PROCEDURE — 71046 X-RAY EXAM CHEST 2 VIEWS: CPT

## 2024-08-27 PROCEDURE — 85025 COMPLETE CBC W/AUTO DIFF WBC: CPT

## 2024-08-27 PROCEDURE — 83880 ASSAY OF NATRIURETIC PEPTIDE: CPT

## 2024-08-27 PROCEDURE — 84484 ASSAY OF TROPONIN QUANT: CPT

## 2024-08-27 PROCEDURE — 80053 COMPREHEN METABOLIC PANEL: CPT

## 2024-08-27 PROCEDURE — 93005 ELECTROCARDIOGRAM TRACING: CPT

## 2024-08-27 ASSESSMENT — PAIN - FUNCTIONAL ASSESSMENT
PAIN_FUNCTIONAL_ASSESSMENT: NONE - DENIES PAIN
PAIN_FUNCTIONAL_ASSESSMENT: NONE - DENIES PAIN

## 2024-08-27 NOTE — ED PROVIDER NOTES
were addressed in the HPI.      REVIEW OF EXTERNAL NOTES :       PDMP Monitoring:    Last PDMP Parmjit as Reviewed:  Review User Review Instant Review Result            Urine Drug Screenings (1 yr)    No resulted procedures found.       Medication Contract and Consent for Opioid Use Documents Filed        No documents found                      REVIEW OF SYSTEMS :      Positives and Pertinent negatives as per HPI.     SURGICAL HISTORY     Past Surgical History:   Procedure Laterality Date    CAROTID ENDARTERECTOMY Left 10/03/2022    CAROTID ENDARTERECTOMY performed by Christian Chavez MD at [facility]    COLONOSCOPY      DIAGNOSTIC CARDIAC CATH LAB PROCEDURE      SIGMOIDOSCOPY      SIGMOIDOSCOPY         CURRENTMEDICATIONS       Previous Medications    ACETAMINOPHEN (TYLENOL) 325 MG TABLET    Take 2 tablets by mouth every 6 hours as needed for Pain    AMLODIPINE-BENAZEPRIL (LOTREL) 10-20 MG PER CAPSULE    Take 1 capsule by mouth every morning    APIXABAN (ELIQUIS) 5 MG TABS TABLET    Take 1 tablet by mouth 2 times daily    ASCORBIC ACID (VITAMIN C) 500 MG CAPS    Take by mouth    ASPIRIN 81 MG EC TABLET    Take 1 tablet by mouth daily    BALSALAZIDE (COLAZAL) 750 MG CAPSULE    Take 3 capsules by mouth 3 times daily    CHOLECALCIFEROL (VITAMIN D3) 50 MCG (2000 UT) TABS    Take 1-3 tablets by mouth daily    CIALIS 20 MG TABLET    Take 1 tablet by mouth as needed for Erectile Dysfunction    DICYCLOMINE (BENTYL) 10 MG CAPSULE    take 1 capsule by mouth three times a day if needed    EPINEPHRINE (EPIPEN 2-GISEL) 0.3 MG/0.3ML SOAJ INJECTION    Inject 0.3 mLs into the muscle once as needed (anaphylaxis) Use as directed for allergic reaction    FINASTERIDE (PROSCAR) 5 MG TABLET    Take 1 tablet by mouth daily    HYDROCHLOROTHIAZIDE (HYDRODIURIL) 25 MG TABLET    Take 1 tablet by mouth every morning    INFLIXIMAB-DYYB (INFLECTRA) 100 MG SOLR    Infuse intravenously    INSULIN GLARGINE (LANTUS) 100 UNIT/ML INJECTION    Inject 18 Units  shock?   No Exclusion criteria - the patient is NOT to be included for SEP-1 Core Measure due to: Infection is not suspected        Medical Decision Making/Differential Diagnosis:    CC/HPI Summary, Social Determinants of health, Records Reviewed, DDx, testing done/not done, ED Course, Reassessment, disposition considerations/shared decision making with patient, consults, disposition:      Guero Chacko is a 68 y.o. male who presents for shortness of breath and hypertension.  Upon my initial evaluation the patient is sitting comfortably in the hospital bed, in no respiratory distress, nontoxic-appearing, with a blood pressure of 139/90 but otherwise stable vitals upon arrival.  Differential diagnosis includes but is not limited to ACS, PE, pneumonia, heart failure, anemia, A-fib, to name a few.    Physical exam was grossly unremarkable.  EKG was ordered to evaluate for arrhythmia and is consistent with previous diagnosis of A-fib, rate controlled.  Chest x-ray was ordered to evaluate for pneumonia or other intra cardio pulmonary process.  CBC was ordered to evaluate for infection, anemia.  CMP was ordered to evaluate for hypoglycemia, or other electrolyte abnormalities.  Troponin was ordered to rule out ischemia, BNP was ordered to evaluate for cardiac strain, D-dimer was ordered to evaluate for PE.    CBC showed mild anemia with hemoglobin of 11.3 down from 12.15 days ago but otherwise no leukocytosis or signs of infection.  CMP was grossly unremarkable with a glucose of 67-patient was given cookie.    Troponins were elevated to 33, repeat troponin pending, D-dimer was negative, proBNP was elevated to 3303 (up from 926 4 years ago) but no signs of fluid overload, edema, cardiomegaly on chest x-ray, or crackles..  Delta troponin was unchanged at 33.    Patient was reevaluated with ambulatory pulse ox and is feeling improved without any complications.    Discussion with patient and shared decision making, we are

## 2024-08-27 NOTE — DISCHARGE INSTRUCTIONS
Return for worsening symptoms, shortness of breath at rest or worsening with walking, chest pain, syncope or changes in mental status.    Follow up with Primary care physician for further evaluation.

## 2024-08-28 LAB
FERRITIN SERPL-MCNC: 79 NG/ML
FOLATE SERPL-MCNC: >20 NG/ML (ref 4.8–24.2)
HGB BLD-MCNC: 10.9 G/DL (ref 12.5–16.5)
IMM RETICS NFR: 11.2 % (ref 2.3–13.4)
IRON SATN MFR SERPL: 14 % (ref 20–55)
IRON SERPL-MCNC: 33 UG/DL (ref 59–158)
RETIC HEMOGLOBIN: 29.8 PG (ref 28.2–36.6)
RETICS # AUTO: 0.06 M/UL
RETICS/RBC NFR AUTO: 1.5 % (ref 0.4–1.9)
TIBC SERPL-MCNC: 241 UG/DL (ref 250–450)
VIT B12 SERPL-MCNC: 712 PG/ML (ref 211–946)

## 2024-08-30 ENCOUNTER — TELEPHONE (OUTPATIENT)
Dept: CARDIOLOGY CLINIC | Age: 69
End: 2024-08-30

## 2024-08-30 NOTE — TELEPHONE ENCOUNTER
Patients wife called and stated Dr. Torres would like him to be seen sooner for sob than October.  Please advise    no shortness of breath/no cough

## 2024-09-02 ENCOUNTER — APPOINTMENT (OUTPATIENT)
Dept: CT IMAGING | Age: 69
End: 2024-09-02
Payer: MEDICARE

## 2024-09-02 ENCOUNTER — HOSPITAL ENCOUNTER (INPATIENT)
Age: 69
LOS: 4 days | Discharge: HOME | End: 2024-09-06
Attending: EMERGENCY MEDICINE | Admitting: FAMILY MEDICINE
Payer: MEDICARE

## 2024-09-02 DIAGNOSIS — I50.9 CONGESTIVE HEART FAILURE, UNSPECIFIED HF CHRONICITY, UNSPECIFIED HEART FAILURE TYPE (HCC): ICD-10-CM

## 2024-09-02 DIAGNOSIS — R06.02 SHORTNESS OF BREATH: Primary | ICD-10-CM

## 2024-09-02 DIAGNOSIS — J90 PLEURAL EFFUSION, BILATERAL: ICD-10-CM

## 2024-09-02 DIAGNOSIS — I48.91 ATRIAL FIBRILLATION WITH RAPID VENTRICULAR RESPONSE (HCC): ICD-10-CM

## 2024-09-02 DIAGNOSIS — I48.0 PAROXYSMAL ATRIAL FIBRILLATION (HCC): ICD-10-CM

## 2024-09-02 DIAGNOSIS — I48.19 PERSISTENT ATRIAL FIBRILLATION (HCC): ICD-10-CM

## 2024-09-02 PROBLEM — I50.21 CHF (CONGESTIVE HEART FAILURE), NYHA CLASS I, ACUTE, SYSTOLIC (HCC): Status: ACTIVE | Noted: 2024-09-02

## 2024-09-02 PROBLEM — G45.9 TIA (TRANSIENT ISCHEMIC ATTACK): Status: RESOLVED | Noted: 2022-10-01 | Resolved: 2024-09-02

## 2024-09-02 PROBLEM — I48.21 PERMANENT ATRIAL FIBRILLATION (HCC): Chronic | Status: ACTIVE | Noted: 2020-07-16

## 2024-09-02 PROBLEM — D50.9 IRON DEFICIENCY ANEMIA: Status: ACTIVE | Noted: 2024-09-02

## 2024-09-02 LAB
ALBUMIN SERPL-MCNC: 4.1 G/DL (ref 3.5–5.2)
ALP SERPL-CCNC: 91 U/L (ref 40–129)
ALT SERPL-CCNC: 21 U/L (ref 0–40)
ANION GAP SERPL CALCULATED.3IONS-SCNC: 11 MMOL/L (ref 7–16)
AST SERPL-CCNC: 34 U/L (ref 0–39)
BASOPHILS # BLD: 0 K/UL (ref 0–0.2)
BASOPHILS NFR BLD: 0 % (ref 0–2)
BILIRUB SERPL-MCNC: 0.5 MG/DL (ref 0–1.2)
BNP SERPL-MCNC: 2970 PG/ML (ref 0–125)
BUN SERPL-MCNC: 25 MG/DL (ref 6–23)
CALCIUM SERPL-MCNC: 9 MG/DL (ref 8.6–10.2)
CHLORIDE SERPL-SCNC: 105 MMOL/L (ref 98–107)
CO2 SERPL-SCNC: 22 MMOL/L (ref 22–29)
CREAT SERPL-MCNC: 1.1 MG/DL (ref 0.7–1.2)
EOSINOPHIL # BLD: 0 K/UL (ref 0.05–0.5)
EOSINOPHILS RELATIVE PERCENT: 0 % (ref 0–6)
ERYTHROCYTE [DISTWIDTH] IN BLOOD BY AUTOMATED COUNT: 13.8 % (ref 11.5–15)
GFR, ESTIMATED: 72 ML/MIN/1.73M2
GLUCOSE BLD-MCNC: 123 MG/DL (ref 74–99)
GLUCOSE BLD-MCNC: 129 MG/DL (ref 74–99)
GLUCOSE BLD-MCNC: 143 MG/DL (ref 74–99)
GLUCOSE BLD-MCNC: 221 MG/DL (ref 74–99)
GLUCOSE SERPL-MCNC: 170 MG/DL (ref 74–99)
HCT VFR BLD AUTO: 36.4 % (ref 37–54)
HGB BLD-MCNC: 11.4 G/DL (ref 12.5–16.5)
LYMPHOCYTES NFR BLD: 0.57 K/UL (ref 1.5–4)
LYMPHOCYTES RELATIVE PERCENT: 8 % (ref 20–42)
MAGNESIUM SERPL-MCNC: 2.2 MG/DL (ref 1.6–2.6)
MCH RBC QN AUTO: 26.6 PG (ref 26–35)
MCHC RBC AUTO-ENTMCNC: 31.3 G/DL (ref 32–34.5)
MCV RBC AUTO: 85 FL (ref 80–99.9)
MONOCYTES NFR BLD: 0.13 K/UL (ref 0.1–0.95)
MONOCYTES NFR BLD: 2 % (ref 2–12)
NEUTROPHILS NFR BLD: 90 % (ref 43–80)
NEUTS SEG NFR BLD: 6.51 K/UL (ref 1.8–7.3)
PLATELET # BLD AUTO: 183 K/UL (ref 130–450)
PMV BLD AUTO: 9.9 FL (ref 7–12)
POTASSIUM SERPL-SCNC: 5.3 MMOL/L (ref 3.5–5)
PROT SERPL-MCNC: 7.5 G/DL (ref 6.4–8.3)
RBC # BLD AUTO: 4.28 M/UL (ref 3.8–5.8)
RBC # BLD: ABNORMAL 10*6/UL
RBC # BLD: ABNORMAL 10*6/UL
SARS-COV-2 RDRP RESP QL NAA+PROBE: NOT DETECTED
SODIUM SERPL-SCNC: 138 MMOL/L (ref 132–146)
SPECIMEN DESCRIPTION: NORMAL
TROPONIN I SERPL HS-MCNC: 24 NG/L (ref 0–11)
WBC OTHER # BLD: 7.2 K/UL (ref 4.5–11.5)

## 2024-09-02 PROCEDURE — 87635 SARS-COV-2 COVID-19 AMP PRB: CPT

## 2024-09-02 PROCEDURE — 74177 CT ABD & PELVIS W/CONTRAST: CPT

## 2024-09-02 PROCEDURE — 71275 CT ANGIOGRAPHY CHEST: CPT

## 2024-09-02 PROCEDURE — 6370000000 HC RX 637 (ALT 250 FOR IP): Performed by: FAMILY MEDICINE

## 2024-09-02 PROCEDURE — 6370000000 HC RX 637 (ALT 250 FOR IP): Performed by: INTERNAL MEDICINE

## 2024-09-02 PROCEDURE — 2060000000 HC ICU INTERMEDIATE R&B

## 2024-09-02 PROCEDURE — 2580000003 HC RX 258

## 2024-09-02 PROCEDURE — 99223 1ST HOSP IP/OBS HIGH 75: CPT | Performed by: INTERNAL MEDICINE

## 2024-09-02 PROCEDURE — 2580000003 HC RX 258: Performed by: FAMILY MEDICINE

## 2024-09-02 PROCEDURE — 84484 ASSAY OF TROPONIN QUANT: CPT

## 2024-09-02 PROCEDURE — 82962 GLUCOSE BLOOD TEST: CPT

## 2024-09-02 PROCEDURE — 99285 EMERGENCY DEPT VISIT HI MDM: CPT

## 2024-09-02 PROCEDURE — 6360000004 HC RX CONTRAST MEDICATION: Performed by: RADIOLOGY

## 2024-09-02 PROCEDURE — 80053 COMPREHEN METABOLIC PANEL: CPT

## 2024-09-02 PROCEDURE — 6360000002 HC RX W HCPCS: Performed by: FAMILY MEDICINE

## 2024-09-02 PROCEDURE — 85025 COMPLETE CBC W/AUTO DIFF WBC: CPT

## 2024-09-02 PROCEDURE — 83735 ASSAY OF MAGNESIUM: CPT

## 2024-09-02 PROCEDURE — 6360000002 HC RX W HCPCS

## 2024-09-02 PROCEDURE — 83880 ASSAY OF NATRIURETIC PEPTIDE: CPT

## 2024-09-02 PROCEDURE — 93005 ELECTROCARDIOGRAM TRACING: CPT

## 2024-09-02 RX ORDER — POTASSIUM CHLORIDE 7.45 MG/ML
10 INJECTION INTRAVENOUS PRN
Status: DISCONTINUED | OUTPATIENT
Start: 2024-09-02 | End: 2024-09-06 | Stop reason: HOSPADM

## 2024-09-02 RX ORDER — M-VIT,TX,IRON,MINS/CALC/FOLIC 27MG-0.4MG
1 TABLET ORAL DAILY
Status: DISCONTINUED | OUTPATIENT
Start: 2024-09-02 | End: 2024-09-06 | Stop reason: HOSPADM

## 2024-09-02 RX ORDER — METOPROLOL SUCCINATE 25 MG/1
50 TABLET, EXTENDED RELEASE ORAL DAILY
Status: COMPLETED | OUTPATIENT
Start: 2024-09-02 | End: 2024-09-02

## 2024-09-02 RX ORDER — METRONIDAZOLE 500 MG/100ML
500 INJECTION, SOLUTION INTRAVENOUS ONCE
Status: COMPLETED | OUTPATIENT
Start: 2024-09-02 | End: 2024-09-02

## 2024-09-02 RX ORDER — HYDROCORTISONE 100 MG/60ML
100 SUSPENSION RECTAL NIGHTLY
Status: DISCONTINUED | OUTPATIENT
Start: 2024-09-02 | End: 2024-09-06 | Stop reason: HOSPADM

## 2024-09-02 RX ORDER — IOPAMIDOL 755 MG/ML
75 INJECTION, SOLUTION INTRAVASCULAR
Status: COMPLETED | OUTPATIENT
Start: 2024-09-02 | End: 2024-09-02

## 2024-09-02 RX ORDER — DILTIAZEM HYDROCHLORIDE 120 MG/1
120 CAPSULE, EXTENDED RELEASE ORAL DAILY
Status: ON HOLD | COMMUNITY
End: 2024-09-06 | Stop reason: HOSPADM

## 2024-09-02 RX ORDER — DILTIAZEM HYDROCHLORIDE 120 MG/1
120 CAPSULE, COATED, EXTENDED RELEASE ORAL 2 TIMES DAILY
Status: DISCONTINUED | OUTPATIENT
Start: 2024-09-02 | End: 2024-09-04

## 2024-09-02 RX ORDER — POTASSIUM CHLORIDE 1500 MG/1
40 TABLET, EXTENDED RELEASE ORAL PRN
Status: DISCONTINUED | OUTPATIENT
Start: 2024-09-02 | End: 2024-09-06 | Stop reason: HOSPADM

## 2024-09-02 RX ORDER — FINASTERIDE 5 MG/1
5 TABLET, FILM COATED ORAL DAILY
Status: DISCONTINUED | OUTPATIENT
Start: 2024-09-02 | End: 2024-09-06 | Stop reason: HOSPADM

## 2024-09-02 RX ORDER — MAGNESIUM SULFATE IN WATER 40 MG/ML
2000 INJECTION, SOLUTION INTRAVENOUS PRN
Status: DISCONTINUED | OUTPATIENT
Start: 2024-09-02 | End: 2024-09-06 | Stop reason: HOSPADM

## 2024-09-02 RX ORDER — INSULIN LISPRO 100 [IU]/ML
0-8 INJECTION, SOLUTION INTRAVENOUS; SUBCUTANEOUS
Status: DISCONTINUED | OUTPATIENT
Start: 2024-09-02 | End: 2024-09-06 | Stop reason: HOSPADM

## 2024-09-02 RX ORDER — ONDANSETRON 4 MG/1
4 TABLET, ORALLY DISINTEGRATING ORAL EVERY 8 HOURS PRN
Status: DISCONTINUED | OUTPATIENT
Start: 2024-09-02 | End: 2024-09-06 | Stop reason: HOSPADM

## 2024-09-02 RX ORDER — DEXTROSE MONOHYDRATE 100 MG/ML
INJECTION, SOLUTION INTRAVENOUS CONTINUOUS PRN
Status: DISCONTINUED | OUTPATIENT
Start: 2024-09-02 | End: 2024-09-06 | Stop reason: HOSPADM

## 2024-09-02 RX ORDER — METOPROLOL SUCCINATE 25 MG/1
100 TABLET, EXTENDED RELEASE ORAL DAILY
Status: DISCONTINUED | OUTPATIENT
Start: 2024-09-02 | End: 2024-09-02

## 2024-09-02 RX ORDER — SODIUM CHLORIDE 0.9 % (FLUSH) 0.9 %
5-40 SYRINGE (ML) INJECTION PRN
Status: DISCONTINUED | OUTPATIENT
Start: 2024-09-02 | End: 2024-09-06 | Stop reason: HOSPADM

## 2024-09-02 RX ORDER — ACETAMINOPHEN 325 MG/1
650 TABLET ORAL EVERY 6 HOURS PRN
Status: DISCONTINUED | OUTPATIENT
Start: 2024-09-02 | End: 2024-09-06 | Stop reason: HOSPADM

## 2024-09-02 RX ORDER — SODIUM CHLORIDE 9 MG/ML
INJECTION, SOLUTION INTRAVENOUS PRN
Status: DISCONTINUED | OUTPATIENT
Start: 2024-09-02 | End: 2024-09-06 | Stop reason: HOSPADM

## 2024-09-02 RX ORDER — INSULIN LISPRO 100 [IU]/ML
0-4 INJECTION, SOLUTION INTRAVENOUS; SUBCUTANEOUS NIGHTLY
Status: DISCONTINUED | OUTPATIENT
Start: 2024-09-02 | End: 2024-09-06 | Stop reason: HOSPADM

## 2024-09-02 RX ORDER — PRAVASTATIN SODIUM 20 MG
20 TABLET ORAL NIGHTLY
Status: DISCONTINUED | OUTPATIENT
Start: 2024-09-02 | End: 2024-09-06 | Stop reason: HOSPADM

## 2024-09-02 RX ORDER — FUROSEMIDE 10 MG/ML
40 INJECTION INTRAMUSCULAR; INTRAVENOUS ONCE
Status: COMPLETED | OUTPATIENT
Start: 2024-09-03 | End: 2024-09-03

## 2024-09-02 RX ORDER — ACETAMINOPHEN 650 MG/1
650 SUPPOSITORY RECTAL EVERY 6 HOURS PRN
Status: DISCONTINUED | OUTPATIENT
Start: 2024-09-02 | End: 2024-09-06 | Stop reason: HOSPADM

## 2024-09-02 RX ORDER — SODIUM CHLORIDE 0.9 % (FLUSH) 0.9 %
5-40 SYRINGE (ML) INJECTION EVERY 12 HOURS SCHEDULED
Status: DISCONTINUED | OUTPATIENT
Start: 2024-09-02 | End: 2024-09-06 | Stop reason: HOSPADM

## 2024-09-02 RX ORDER — POLYETHYLENE GLYCOL 3350 17 G/17G
17 POWDER, FOR SOLUTION ORAL DAILY PRN
Status: DISCONTINUED | OUTPATIENT
Start: 2024-09-02 | End: 2024-09-06 | Stop reason: HOSPADM

## 2024-09-02 RX ORDER — DOXAZOSIN 4 MG/1
4 TABLET ORAL
Status: DISCONTINUED | OUTPATIENT
Start: 2024-09-02 | End: 2024-09-06 | Stop reason: HOSPADM

## 2024-09-02 RX ORDER — FUROSEMIDE 10 MG/ML
40 INJECTION INTRAMUSCULAR; INTRAVENOUS ONCE
Status: COMPLETED | OUTPATIENT
Start: 2024-09-02 | End: 2024-09-02

## 2024-09-02 RX ORDER — FUROSEMIDE 20 MG
40 TABLET ORAL EVERY OTHER DAY
Status: ON HOLD | COMMUNITY
End: 2024-09-06 | Stop reason: HOSPADM

## 2024-09-02 RX ORDER — ENOXAPARIN SODIUM 100 MG/ML
40 INJECTION SUBCUTANEOUS DAILY
Status: DISCONTINUED | OUTPATIENT
Start: 2024-09-02 | End: 2024-09-06 | Stop reason: HOSPADM

## 2024-09-02 RX ORDER — ONDANSETRON 2 MG/ML
4 INJECTION INTRAMUSCULAR; INTRAVENOUS EVERY 6 HOURS PRN
Status: DISCONTINUED | OUTPATIENT
Start: 2024-09-02 | End: 2024-09-06 | Stop reason: HOSPADM

## 2024-09-02 RX ORDER — GLUCAGON 1 MG/ML
1 KIT INJECTION PRN
Status: DISCONTINUED | OUTPATIENT
Start: 2024-09-02 | End: 2024-09-06 | Stop reason: HOSPADM

## 2024-09-02 RX ORDER — INSULIN GLARGINE 100 [IU]/ML
5 INJECTION, SOLUTION SUBCUTANEOUS NIGHTLY
Status: DISCONTINUED | OUTPATIENT
Start: 2024-09-02 | End: 2024-09-06 | Stop reason: HOSPADM

## 2024-09-02 RX ORDER — DILTIAZEM HYDROCHLORIDE 120 MG/1
120 CAPSULE, COATED, EXTENDED RELEASE ORAL DAILY
Status: DISCONTINUED | OUTPATIENT
Start: 2024-09-02 | End: 2024-09-02

## 2024-09-02 RX ORDER — HYDROCORTISONE 100 MG/60ML
100 SUSPENSION RECTAL NIGHTLY
COMMUNITY

## 2024-09-02 RX ORDER — ASPIRIN 81 MG/1
81 TABLET ORAL DAILY
Status: DISCONTINUED | OUTPATIENT
Start: 2024-09-02 | End: 2024-09-06 | Stop reason: HOSPADM

## 2024-09-02 RX ADMIN — SODIUM CHLORIDE, PRESERVATIVE FREE 10 ML: 5 INJECTION INTRAVENOUS at 13:07

## 2024-09-02 RX ADMIN — METOPROLOL SUCCINATE 50 MG: 25 TABLET, EXTENDED RELEASE ORAL at 10:45

## 2024-09-02 RX ADMIN — FINASTERIDE 5 MG: 5 TABLET, FILM COATED ORAL at 10:44

## 2024-09-02 RX ADMIN — INSULIN LISPRO 2 UNITS: 100 INJECTION, SOLUTION INTRAVENOUS; SUBCUTANEOUS at 15:39

## 2024-09-02 RX ADMIN — DOXAZOSIN 4 MG: 4 TABLET ORAL at 20:51

## 2024-09-02 RX ADMIN — DILTIAZEM HYDROCHLORIDE 120 MG: 120 CAPSULE, COATED, EXTENDED RELEASE ORAL at 10:46

## 2024-09-02 RX ADMIN — ENOXAPARIN SODIUM 40 MG: 100 INJECTION SUBCUTANEOUS at 10:46

## 2024-09-02 RX ADMIN — INSULIN GLARGINE 5 UNITS: 100 INJECTION, SOLUTION SUBCUTANEOUS at 20:52

## 2024-09-02 RX ADMIN — WATER 2000 MG: 1 INJECTION INTRAMUSCULAR; INTRAVENOUS; SUBCUTANEOUS at 06:39

## 2024-09-02 RX ADMIN — HYDROCORTISONE 100 MG: 100 ENEMA RECTAL at 20:51

## 2024-09-02 RX ADMIN — ASPIRIN 81 MG: 81 TABLET, COATED ORAL at 11:09

## 2024-09-02 RX ADMIN — PRAVASTATIN SODIUM 20 MG: 20 TABLET ORAL at 20:51

## 2024-09-02 RX ADMIN — DILTIAZEM HYDROCHLORIDE 120 MG: 120 CAPSULE, COATED, EXTENDED RELEASE ORAL at 20:51

## 2024-09-02 RX ADMIN — Medication 1 TABLET: at 10:45

## 2024-09-02 RX ADMIN — METRONIDAZOLE 500 MG: 500 INJECTION, SOLUTION INTRAVENOUS at 06:39

## 2024-09-02 RX ADMIN — SODIUM CHLORIDE, PRESERVATIVE FREE 10 ML: 5 INJECTION INTRAVENOUS at 20:51

## 2024-09-02 RX ADMIN — IOPAMIDOL 75 ML: 755 INJECTION, SOLUTION INTRAVENOUS at 04:11

## 2024-09-02 RX ADMIN — FUROSEMIDE 40 MG: 10 INJECTION, SOLUTION INTRAMUSCULAR; INTRAVENOUS at 06:38

## 2024-09-02 ASSESSMENT — PAIN - FUNCTIONAL ASSESSMENT
PAIN_FUNCTIONAL_ASSESSMENT: 0-10
PAIN_FUNCTIONAL_ASSESSMENT: NONE - DENIES PAIN

## 2024-09-02 ASSESSMENT — LIFESTYLE VARIABLES
HOW MANY STANDARD DRINKS CONTAINING ALCOHOL DO YOU HAVE ON A TYPICAL DAY: PATIENT DOES NOT DRINK
HOW OFTEN DO YOU HAVE A DRINK CONTAINING ALCOHOL: NEVER

## 2024-09-02 ASSESSMENT — PAIN SCALES - GENERAL: PAINLEVEL_OUTOF10: 0

## 2024-09-02 NOTE — ED NOTES
Patient in bed with eyes closed, easy to arouse, vitals stable, patient has no needs at this time and was updated on what we are awaiting on and approx time. Family at bedside, no questions or needs at this time.

## 2024-09-02 NOTE — H&P
[place] [address]                           HISTORY & PHYSICAL      PATIENT NAME: IVANNA MC                : 1955  MED REC NO: [id number]                        ROOM: 21  ACCOUNT NO: [id number]                       ADMIT DATE: 2024  PROVIDER: Cameron Torres MD      CHIEF COMPLAINT:  Shortness of breath.    HISTORY OF PRESENT ILLNESS:  The patient is a 68-year-old white male has had 1 plus day history of not feeling well.  He seems to be getting short of breath most pronounced with lying down.  He became progressively worse.  He presented to the emergency room.  He was found to have atrial fibrillation with rapid ventricular response and congestive heart failure with pleural effusions on imaging studies.  He denies chest pain.  He was recently placed on Lasix 40 mg every other day by his cardiologist that was only several days previously.  The patient was subsequently admitted for further evaluation and treatment.    PAST MEDICAL HISTORY:  Surgeries:  Partial colectomy recently in [place] for ulcerative colitis, bilateral carotid endarterectomy in the past.    Hospitalizations:  Recently for a partial colectomy in 2024 in [facility].    ALLERGIES:  SULFA AND BEE STINGS.      CHRONIC MEDICAL PROBLEMS:  Include atrial fibrillation, ulcerative colitis, iron-deficiency anemia, hyperlipidemia, hypertension, diabetes mellitus, and coronary artery disease.    MEDICATIONS:  Aspirin, Cardizem, Proscar, insulin sliding scale, Lantus, Toprol-XL.  He also takes various vitamins, Lasix.  Also potassium and Pravachol.    SOCIAL HISTORY:  The patient lives with his wife.  Quit smoking in .  Approximate 11 pack year history of smoking.  Occasional alcohol.  No drug use.    FAMILY HISTORY:  Positive for brain cancer, hypertension, coronary artery disease.    REVIEW OF SYSTEMS:  Basically, unremarkable

## 2024-09-02 NOTE — ED NOTES
With any exertion patient's HR increases between 130's - 160's and quickly rebounds to 110's low 120's.

## 2024-09-02 NOTE — H&P
Feels ok- not sob  Vss  lungs clear  rhythm  irregular  abd- mild tenderness LLQ- no guarding-rebound  ext- no edema  Stable- lab noted  Per orders- continue present care- various consultants to see

## 2024-09-02 NOTE — CONSULTS
chloride (KLOR-CON M) extended release tablet 40 mEq, 40 mEq, Oral, PRN **OR** potassium bicarb-citric acid (EFFER-K) effervescent tablet 40 mEq, 40 mEq, Oral, PRN **OR** potassium chloride 10 mEq/100 mL IVPB (Peripheral Line), 10 mEq, IntraVENous, PRN  magnesium sulfate 2000 mg in 50 mL IVPB premix, 2,000 mg, IntraVENous, PRN  enoxaparin (LOVENOX) injection 40 mg, 40 mg, SubCUTAneous, Daily  ondansetron (ZOFRAN-ODT) disintegrating tablet 4 mg, 4 mg, Oral, Q8H PRN **OR** ondansetron (ZOFRAN) injection 4 mg, 4 mg, IntraVENous, Q6H PRN  polyethylene glycol (GLYCOLAX) packet 17 g, 17 g, Oral, Daily PRN  acetaminophen (TYLENOL) tablet 650 mg, 650 mg, Oral, Q6H PRN **OR** acetaminophen (TYLENOL) suppository 650 mg, 650 mg, Rectal, Q6H PRN  glucose chewable tablet 16 g, 4 tablet, Oral, PRN  dextrose bolus 10% 125 mL, 125 mL, IntraVENous, PRN **OR** dextrose bolus 10% 250 mL, 250 mL, IntraVENous, PRN  glucagon injection 1 mg, 1 mg, SubCUTAneous, PRN  dextrose 10 % infusion, , IntraVENous, Continuous PRN  aspirin EC tablet 81 mg, 81 mg, Oral, Daily  dilTIAZem (CARDIZEM CD) extended release capsule 120 mg, 120 mg, Oral, Daily  finasteride (PROSCAR) tablet 5 mg, 5 mg, Oral, Daily  pravastatin (PRAVACHOL) tablet 20 mg, 20 mg, Oral, Nightly  therapeutic multivitamin-minerals 1 tablet, 1 tablet, Oral, Daily  insulin lispro (HUMALOG,ADMELOG) injection vial 0-8 Units, 0-8 Units, SubCUTAneous, TID WC  insulin lispro (HUMALOG,ADMELOG) injection vial 0-4 Units, 0-4 Units, SubCUTAneous, Nightly  doxazosin (CARDURA) tablet 4 mg, 4 mg, Oral, Daily  insulin glargine (LANTUS) injection vial 5 Units, 5 Units, SubCUTAneous, Nightly  [START ON 9/3/2024] metoprolol succinate (TOPROL XL) extended release tablet 150 mg, 150 mg, Oral, Daily  metoprolol succinate (TOPROL XL) extended release tablet 50 mg, 50 mg, Oral, Daily    Allergies:  Bee venom and Sulfa antibiotics    Social History:    TOBACCO:   reports that he quit smoking about 42 years  effusion    Watch fluid balance, diurese as per Cardiology and PCP  Discussed continued diuresis and thoracentesis with risks/benefits explained, he is hoping to avoid procedures so wants to cont with diuresis for now, will reassess in a few days if any improvement of effusion/symptoms and reconsider thoracentesis if no better/worse.  Cardiac rate control as per Cardiology  On RA and no lung medications, observe respiratory function, looking comfortable with breathing  OOB to chair, ambulate as tolerated      Time at the bedside, reviewing labs and radiographs, reviewing notes and consultations, discussing with staff and family was more than 55 minutes.    Thanks for letting us see this patient in consultation.  Please contact us with any questions. Office [phone] or after hours through [facility], x [phone].

## 2024-09-02 NOTE — CONSULTS
needed - monitor BP and HR. s/p 31mm Watchman FLX PRO device implant 10/19/23 - Off OAC    Small Pericardial effusion  - Echo 6/2024    PAD - s/p Left CEA (Oct 2022)    Mild non-obstructive CAD (based on [facility] in 2005)    Hypertension - Monitor BP, adjust meds    Hyperlipidemia - on Statin    Insulin dependant T2DM    Ulcerative colitis (HCC) - RLQ Ileostomy    Hx fo TIA due to carotid stenosis - s/p L CEA (Oct 2022)          Above d/w him and his family, over phone  d/w Pulmonary - Dr Thompson    Electronically signed by Rosales Carrasco MD on 9/2/2024 at 10:36 AM  [facility]

## 2024-09-02 NOTE — CONSULTS
INPATIENT CONSULTATION RECORD FOR  9/2/2024      [facility]  [address]  [place] [id number] [phone]                                                                                                                         REASON FOR CONSULTATION:      Bladder diverticulum with some bladder  wall thickening    HISTORY OF PRESENT ILLNESS:      The patient is a 68 y.o. male patient who presents with Afib and RVR. He was having some shortness of breath. He is well known to Dr. Chu of [facility]. Unfortunately he does not come to this hospital. The patient denies any urinary issues at this time.   No gross hematuria.   He has had cystoscopy with Dr. Chu in the past. I reviewed his images of his CT scan with Eva as well as his wife who was on the phone.   He is voiding without hematuria at this time.  No history of  malignancy       Past Medical History:   Diagnosis Date    SHIVAM (cerebral atherosclerosis)     Coronary atherosclerosis of native coronary artery     COVID     Diabetes mellitus (HCC)     Dizziness     History of tobacco use 5/19/2022    Hyperlipidemia     Hypertension     Left carotid stenosis 5/19/2022    Ulcerative colitis (HCC)          Past Surgical History:   Procedure Laterality Date    CAROTID ENDARTERECTOMY Left 10/03/2022    CAROTID ENDARTERECTOMY performed by Christian Chavez MD at [facility]    COLONOSCOPY      DIAGNOSTIC CARDIAC CATH LAB PROCEDURE      SIGMOIDOSCOPY      SIGMOIDOSCOPY         Medications Prior to Admission:    Not in a hospital admission.    Allergies:    Bee venom and Sulfa antibiotics    Social History:    reports that he quit smoking about 42 years ago. His smoking use included cigarettes. He started smoking about 54 years ago. He has a 12 pack-year smoking history. He has never used smokeless tobacco. He reports current alcohol use. He reports that he does not use drugs.    Family History:   Non-contributory to this Urological

## 2024-09-02 NOTE — ED PROVIDER NOTES
Department of Emergency Medicine   ED Provider Note  Admit Date/RoomTime: 9/2/2024  2:06 AM  ED Room: 21/21          History of Present Illness:  9/2/24, Time: 2:26 AM EDT       Guero Chacko is a 68 y.o. male presenting to the ED for shortness of breath.  Notes he was laying down to sleep and had sudden worsening shortness of breath.  Notes he has never had shortness of breath like this before.  He reports he feels like he cannot get a good deep breath.  Denies any chest pain.  Does note some abdominal pain that feels like pressure in his upper abdomen.  No congestion, fevers or chills, dysuria, hematuria, change in colostomy output, leg swelling.  Was just started on Lasix 3 days ago for concern for CHF.    Review of Systems:     Pertinent positives and negatives are stated within HPI.      --------------------------------------------- PAST HISTORY ---------------------------------------------  Past Medical History:  has a past medical history of SHIVAM (cerebral atherosclerosis), Coronary atherosclerosis of native coronary artery, COVID, Diabetes mellitus (HCC), Dizziness, History of tobacco use, Hyperlipidemia, Hypertension, Left carotid stenosis, and Ulcerative colitis (HCC).    Past Surgical History:  has a past surgical history that includes Diagnostic Cardiac Cath Lab Procedure; sigmoidoscopy; Colonoscopy; Carotid endarterectomy (Left, 10/03/2022); and sigmoidoscopy.    Social History:  reports that he quit smoking about 42 years ago. His smoking use included cigarettes. He started smoking about 52 years ago. He has a 9.9 pack-year smoking history. He has never used smokeless tobacco. He reports current alcohol use. He reports that he does not use drugs.    Family History: family history includes Cancer in his mother.     The patient’s home medications have been reviewed.    Allergies: Bee venom and Sulfa antibiotics      ---------------------------------------------------PHYSICAL  Course for EKG documentation    Is this patient to be included in the SEP-1 Core Measure due to severe sepsis or septic shock?   No   Exclusion criteria - the patient is NOT to be included for SEP-1 Core Measure due to:  Infection is not suspected      ------------------------- NURSING NOTES AND VITALS REVIEWED ---------------------------   The nursing notes within the ED encounter and vital signs as below have been reviewed by myself.  BP (!) 169/115   Pulse (!) 115   Temp 98.2 °F (36.8 °C) (Oral)   Resp 16   Ht 1.854 m (6' 1\")   Wt 73 kg (161 lb)   SpO2 96%   BMI 21.24 kg/m²   Oxygen Saturation Interpretation: Normal    The patient’s available past medical records and past encounters were reviewed, see MDM for details.        ------------------------------ ED COURSE/MEDICAL DECISION MAKING----------------------  Medications   metroNIDAZOLE (FLAGYL) 500 mg in 0.9% NaCl 100 mL IVPB premix (500 mg IntraVENous New Bag 9/2/24 0639)   iopamidol (ISOVUE-370) 76 % injection 75 mL (75 mLs IntraVENous Given 9/2/24 4611)   furosemide (LASIX) injection 40 mg (40 mg IntraVENous Given 9/2/24 0638)   cefTRIAXone (ROCEPHIN) 2,000 mg in sterile water 20 mL IV syringe (2,000 mg IntraVENous Given 9/2/24 0639)            Medical Decision Making:     ED Course as of 09/02/24 0718   Mon Sep 02, 2024   0250 Echocardiogram from October 2022 shows an EF of 50% [KK]   0250 EKG shows atrial fibrillation 116 beats a minute no signs of ST changes no ST elevation QTc 467.  Increased rate from prior EKG scattered PVC.  EKG inter by myself [KK]   0307 Patient states he is no longer anticoagulated on Eliquis.  He had the Watchman procedure last year [KK]      ED Course User Index  [KK] Jovana Barton MD       Medical Decision Making  Guero is a 68-year-old male presenting to the ED with complaints of shortness of breath.  Notes it started suddenly while laying down to sleep tonight.  Patient is in A-fib chronically, on arrival was in RVR

## 2024-09-02 NOTE — PROGRESS NOTES
4 Eyes Skin Assessment     NAME:  Guero Chacko  YOB: 1955  MEDICAL RECORD NUMBER:  [id number]    The patient is being assessed for  Admission    I agree that at least one RN has performed a thorough Head to Toe Skin Assessment on the patient. ALL assessment sites listed below have been assessed.      Areas assessed by both nurses:    Head, Face, Ears, Shoulders, Back, Chest, Arms, Elbows, Hands, Sacrum. Buttock, Coccyx, Ischium, and Legs. Feet and Heels        Does the Patient have a Wound? No noted wound(s)       Sriram Prevention initiated by RN: No  Wound Care Orders initiated by RN: No    Pressure Injury (Stage 3,4, Unstageable, DTI, NWPT, and Complex wounds) if present, place Wound referral order by RN under : No    New Ostomies, if present place, Ostomy referral order under : Yes     Nurse 1 eSignature: Electronically signed by Petty Sanchez RN on 9/2/24 at 3:05 PM EDT    **SHARE this note so that the co-signing nurse can place an eSignature**    Nurse 2 eSignature: {Esignature:[id number]}

## 2024-09-02 NOTE — ED PROVIDER NOTES
Department of Emergency Medicine  FIRST PROVIDER TRIAGE NOTE             Independent MLP           9/2/24  2:03 AM EDT    Date of Encounter: 9/2/24   MRN: [id number]      HPI: Guero Chacko is a 68 y.o. male with a history of A-fib, CAD, hyperlipidemia, hypertension, left carotid stenosis, and diabetes mellitus who presents to the ED for evaluation of shortness of breath that started shortly after laying down this evening.  He awakened around 11 PM short of breath and sweaty with palpitations.     ROS: Negative for cp, back pain, dizziness, calf pain, or send changes in his weight.    PE: Gen Appearance/Constitutional: alert  CV: tachycardic irregular rate and rhythm  Pulm: CTA bilat    Provider-Patient relationship only established for Provider In Triage (PIT)  Full assessment, HPI and examination not performed, therefore, it is not yet possible to state whether or not an emergency medical condition exists   Focused assessment only during triage. This is not a comprehensive evaluation due to no physical ER space, staff shortage and high numbers of boarding patients in the ER     Initial Plan of Care: All treatment areas with department are currently occupied. Plan to order/Initiate the following while awaiting opening in ED: EKG.  Initiate Treatment-Testing, Proceed toTreatment Area When Bed Available for ED Attending/MLP to Continue Care    Electronically signed by WILIAM Mccall CNP   DD: 9/2/24       Hannah Ledbetter APRN - CNP  09/02/24 0207

## 2024-09-02 NOTE — CONSULTS
Surgery Consult  CARLOS Winchester MD FACS    Reason for consult: abnormal CT, hx UC s/p subtotal colectomy with EI at [facility] in June    HPI  68 y.o. male hx of medically refractory UC s/p subtotal colectomy with end ileostomy in June at [facility].  Follows with colorectal at [facility].  Most recently seen last week with plans for completion proctectomy next month.  Admitted with SOB/CHF/pleural effusions.  Surgery consulted for abnormal CT with possible enteritis. Denies any issues with his ileostomy.  He had just emptied it before I saw him.  Denies any bloody ileostomy output.  Says he was having some left lower quadrant discomfort but that has since resolved.       Past Medical History:   Diagnosis Date    SHIVAM (cerebral atherosclerosis)     Coronary atherosclerosis of native coronary artery     COVID     Diabetes mellitus (HCC)     Dizziness     History of tobacco use 5/19/2022    Hyperlipidemia     Hypertension     Left carotid stenosis 5/19/2022    Ulcerative colitis (HCC)        Past Surgical History:   Procedure Laterality Date    CAROTID ENDARTERECTOMY Left 10/03/2022    CAROTID ENDARTERECTOMY performed by Christian Chavez MD at [facility]    COLONOSCOPY      DIAGNOSTIC CARDIAC CATH LAB PROCEDURE      SIGMOIDOSCOPY      SIGMOIDOSCOPY         Medications Prior to Admission:    Prior to Admission medications    Medication Sig Start Date End Date Taking? Authorizing Provider   furosemide (LASIX) 20 MG tablet Take 2 tablets by mouth every other day   Yes ProviderKarla MD   dilTIAZem (CARDIZEM 12 HR) 120 MG extended release capsule Take 1 capsule by mouth daily   Yes Karla Jarrett MD   finasteride (PROSCAR) 5 MG tablet Take 1 tablet by mouth daily   Yes Karla Jarrett MD   acetaminophen (TYLENOL) 325 MG tablet Take 2 tablets by mouth every 6 hours as needed for Pain 10/5/22  Yes José Breen, DO   aspirin 81 MG EC tablet Take 1 tablet by mouth daily 10/6/22  Yes José Breen, DO   Cholecalciferol

## 2024-09-02 NOTE — ED NOTES
ED to Inpatient Handoff Report    Notified Naomi that electronic handoff available and patient ready for transport to room 641.    Safety Risks: Risk of falls    Patient in Restraints: no    Constant Observer or Patient : no    Telemetry Monitoring Ordered :Yes      Cardiac Rhythm: Atrial fib    Order to transfer to unit without monitor:YES    Last MEWS: 1 Time completed: 1200    Deterioration Index Score:   Predictive Model Details          32 (Caution)  Factor Value    Calculated 9/2/2024 12:22 36% Age 68 years old    Deterioration Index Model 18% Potassium abnormal (5.3 mmol/L)     13% Systolic 166     12% Cardiac rhythm Atrial fib     10% Pulse 105     7% Respiratory rate 18     3% BUN abnormal (25 mg/dL)     0% Sodium 138 mmol/L     0% Hematocrit abnormal (36.4 %)     0% Pulse oximetry 96 %     0% Temperature 98.2 °F (36.8 °C)     0% WBC count 7.2 k/uL        Vitals:    09/02/24 1007 09/02/24 1046 09/02/24 1102 09/02/24 1200   BP:   (!) 147/101 (!) 166/105   Pulse: (!) 125 (!) 123 (!) 108 (!) 105   Resp: 19  18 18   Temp:    98.2 °F (36.8 °C)   TempSrc:    Oral   SpO2:    96%   Weight:       Height:             Opportunity for questions and clarification was provided.

## 2024-09-03 PROBLEM — I50.33 ACUTE ON CHRONIC DIASTOLIC CONGESTIVE HEART FAILURE (HCC): Status: ACTIVE | Noted: 2024-09-03

## 2024-09-03 PROBLEM — I50.21 CHF (CONGESTIVE HEART FAILURE), NYHA CLASS I, ACUTE, SYSTOLIC (HCC): Status: RESOLVED | Noted: 2024-09-02 | Resolved: 2024-09-03

## 2024-09-03 PROBLEM — I50.9 ACUTE ON CHRONIC CONGESTIVE HEART FAILURE (HCC): Status: RESOLVED | Noted: 2024-09-03 | Resolved: 2024-09-03

## 2024-09-03 PROBLEM — I50.9 ACUTE ON CHRONIC CONGESTIVE HEART FAILURE (HCC): Status: ACTIVE | Noted: 2024-09-03

## 2024-09-03 LAB
ALBUMIN SERPL-MCNC: 4 G/DL (ref 3.5–5.2)
ALP SERPL-CCNC: 76 U/L (ref 40–129)
ALT SERPL-CCNC: 19 U/L (ref 0–40)
ANION GAP SERPL CALCULATED.3IONS-SCNC: 12 MMOL/L (ref 7–16)
AST SERPL-CCNC: 18 U/L (ref 0–39)
BASOPHILS # BLD: 0.07 K/UL (ref 0–0.2)
BASOPHILS NFR BLD: 1 % (ref 0–2)
BILIRUB SERPL-MCNC: 0.6 MG/DL (ref 0–1.2)
BUN SERPL-MCNC: 16 MG/DL (ref 6–23)
CALCIUM SERPL-MCNC: 9 MG/DL (ref 8.6–10.2)
CHLORIDE SERPL-SCNC: 105 MMOL/L (ref 98–107)
CO2 SERPL-SCNC: 23 MMOL/L (ref 22–29)
CREAT SERPL-MCNC: 0.9 MG/DL (ref 0.7–1.2)
EOSINOPHIL # BLD: 0.07 K/UL (ref 0.05–0.5)
EOSINOPHILS RELATIVE PERCENT: 1 % (ref 0–6)
ERYTHROCYTE [DISTWIDTH] IN BLOOD BY AUTOMATED COUNT: 13.5 % (ref 11.5–15)
GFR, ESTIMATED: >90 ML/MIN/1.73M2
GLUCOSE BLD-MCNC: 123 MG/DL (ref 74–99)
GLUCOSE BLD-MCNC: 129 MG/DL (ref 74–99)
GLUCOSE BLD-MCNC: 145 MG/DL (ref 74–99)
GLUCOSE BLD-MCNC: 174 MG/DL (ref 74–99)
GLUCOSE SERPL-MCNC: 151 MG/DL (ref 74–99)
HCT VFR BLD AUTO: 38.4 % (ref 37–54)
HGB BLD-MCNC: 12.2 G/DL (ref 12.5–16.5)
IMM GRANULOCYTES # BLD AUTO: 0.03 K/UL (ref 0–0.58)
IMM GRANULOCYTES NFR BLD: 1 % (ref 0–5)
LYMPHOCYTES NFR BLD: 0.62 K/UL (ref 1.5–4)
LYMPHOCYTES RELATIVE PERCENT: 10 % (ref 20–42)
MAGNESIUM SERPL-MCNC: 2.1 MG/DL (ref 1.6–2.6)
MCH RBC QN AUTO: 26.9 PG (ref 26–35)
MCHC RBC AUTO-ENTMCNC: 31.8 G/DL (ref 32–34.5)
MCV RBC AUTO: 84.6 FL (ref 80–99.9)
MONOCYTES NFR BLD: 0.33 K/UL (ref 0.1–0.95)
MONOCYTES NFR BLD: 5 % (ref 2–12)
NEUTROPHILS NFR BLD: 83 % (ref 43–80)
NEUTS SEG NFR BLD: 5.41 K/UL (ref 1.8–7.3)
PLATELET # BLD AUTO: 175 K/UL (ref 130–450)
PMV BLD AUTO: 9.6 FL (ref 7–12)
POTASSIUM SERPL-SCNC: 3.5 MMOL/L (ref 3.5–5)
PROT SERPL-MCNC: 7.2 G/DL (ref 6.4–8.3)
RBC # BLD AUTO: 4.54 M/UL (ref 3.8–5.8)
SODIUM SERPL-SCNC: 140 MMOL/L (ref 132–146)
WBC OTHER # BLD: 6.5 K/UL (ref 4.5–11.5)

## 2024-09-03 PROCEDURE — 6360000002 HC RX W HCPCS: Performed by: FAMILY MEDICINE

## 2024-09-03 PROCEDURE — 36415 COLL VENOUS BLD VENIPUNCTURE: CPT

## 2024-09-03 PROCEDURE — 6370000000 HC RX 637 (ALT 250 FOR IP): Performed by: INTERNAL MEDICINE

## 2024-09-03 PROCEDURE — 82962 GLUCOSE BLOOD TEST: CPT

## 2024-09-03 PROCEDURE — 6360000002 HC RX W HCPCS: Performed by: INTERNAL MEDICINE

## 2024-09-03 PROCEDURE — 80053 COMPREHEN METABOLIC PANEL: CPT

## 2024-09-03 PROCEDURE — 83735 ASSAY OF MAGNESIUM: CPT

## 2024-09-03 PROCEDURE — 2060000000 HC ICU INTERMEDIATE R&B

## 2024-09-03 PROCEDURE — 99233 SBSQ HOSP IP/OBS HIGH 50: CPT | Performed by: INTERNAL MEDICINE

## 2024-09-03 PROCEDURE — 2580000003 HC RX 258: Performed by: FAMILY MEDICINE

## 2024-09-03 PROCEDURE — 85025 COMPLETE CBC W/AUTO DIFF WBC: CPT

## 2024-09-03 PROCEDURE — 6370000000 HC RX 637 (ALT 250 FOR IP): Performed by: FAMILY MEDICINE

## 2024-09-03 RX ORDER — HYDRALAZINE HYDROCHLORIDE 20 MG/ML
5 INJECTION INTRAMUSCULAR; INTRAVENOUS ONCE
Status: COMPLETED | OUTPATIENT
Start: 2024-09-03 | End: 2024-09-03

## 2024-09-03 RX ORDER — HYDRALAZINE HYDROCHLORIDE 20 MG/ML
5 INJECTION INTRAMUSCULAR; INTRAVENOUS EVERY 6 HOURS PRN
Status: DISCONTINUED | OUTPATIENT
Start: 2024-09-03 | End: 2024-09-03

## 2024-09-03 RX ORDER — HYDRALAZINE HYDROCHLORIDE 20 MG/ML
10 INJECTION INTRAMUSCULAR; INTRAVENOUS EVERY 6 HOURS PRN
Status: DISCONTINUED | OUTPATIENT
Start: 2024-09-03 | End: 2024-09-06 | Stop reason: HOSPADM

## 2024-09-03 RX ADMIN — SODIUM CHLORIDE, PRESERVATIVE FREE 10 ML: 5 INJECTION INTRAVENOUS at 08:04

## 2024-09-03 RX ADMIN — HYDRALAZINE HYDROCHLORIDE 5 MG: 20 INJECTION INTRAMUSCULAR; INTRAVENOUS at 03:56

## 2024-09-03 RX ADMIN — HYDRALAZINE HYDROCHLORIDE 10 MG: 20 INJECTION INTRAMUSCULAR; INTRAVENOUS at 23:36

## 2024-09-03 RX ADMIN — INSULIN GLARGINE 5 UNITS: 100 INJECTION, SOLUTION SUBCUTANEOUS at 21:44

## 2024-09-03 RX ADMIN — DILTIAZEM HYDROCHLORIDE 120 MG: 120 CAPSULE, COATED, EXTENDED RELEASE ORAL at 21:52

## 2024-09-03 RX ADMIN — PRAVASTATIN SODIUM 20 MG: 20 TABLET ORAL at 21:52

## 2024-09-03 RX ADMIN — FINASTERIDE 5 MG: 5 TABLET, FILM COATED ORAL at 08:03

## 2024-09-03 RX ADMIN — FUROSEMIDE 40 MG: 10 INJECTION, SOLUTION INTRAMUSCULAR; INTRAVENOUS at 05:59

## 2024-09-03 RX ADMIN — HYDRALAZINE HYDROCHLORIDE 5 MG: 20 INJECTION INTRAMUSCULAR; INTRAVENOUS at 02:05

## 2024-09-03 RX ADMIN — SODIUM CHLORIDE, PRESERVATIVE FREE 10 ML: 5 INJECTION INTRAVENOUS at 21:53

## 2024-09-03 RX ADMIN — METOPROLOL SUCCINATE 150 MG: 100 TABLET, EXTENDED RELEASE ORAL at 08:02

## 2024-09-03 RX ADMIN — DOXAZOSIN 4 MG: 4 TABLET ORAL at 21:52

## 2024-09-03 RX ADMIN — DILTIAZEM HYDROCHLORIDE 120 MG: 120 CAPSULE, COATED, EXTENDED RELEASE ORAL at 08:02

## 2024-09-03 RX ADMIN — ASPIRIN 81 MG: 81 TABLET, COATED ORAL at 08:02

## 2024-09-03 RX ADMIN — ACETAMINOPHEN 650 MG: 325 TABLET ORAL at 22:00

## 2024-09-03 RX ADMIN — Medication 1 TABLET: at 08:03

## 2024-09-03 RX ADMIN — ENOXAPARIN SODIUM 40 MG: 100 INJECTION SUBCUTANEOUS at 08:02

## 2024-09-03 ASSESSMENT — PAIN - FUNCTIONAL ASSESSMENT: PAIN_FUNCTIONAL_ASSESSMENT: ACTIVITIES ARE NOT PREVENTED

## 2024-09-03 ASSESSMENT — PAIN DESCRIPTION - LOCATION: LOCATION: HEAD

## 2024-09-03 ASSESSMENT — PAIN DESCRIPTION - ORIENTATION: ORIENTATION: MID

## 2024-09-03 ASSESSMENT — PAIN SCALES - GENERAL: PAINLEVEL_OUTOF10: 4

## 2024-09-03 ASSESSMENT — PAIN DESCRIPTION - DESCRIPTORS: DESCRIPTORS: ACHING

## 2024-09-03 NOTE — PROGRESS NOTES
MCHC 31.3* 31.8*   RDW 13.8 13.5    175   MPV 9.9 9.6     Lab Results   Component Value Date    CKTOTAL 37 12/27/2010    CKMB <0.2 12/27/2010    TROPONINI <0.01 02/11/2021    TROPONINI <0.01 02/06/2021    TROPONINI <0.01 07/08/2020     Lab Results   Component Value Date    INR 1.3 10/03/2022    INR 1.2 09/30/2022    INR 1.4 02/11/2021    PROTIME 13.6 (H) 10/03/2022    PROTIME 13.4 (H) 09/30/2022    PROTIME 16.3 (H) 02/11/2021     No results found for: \"TSHFT4\", \"TSH\"  Lab Results   Component Value Date    LABA1C 5.8 (H) 10/01/2022     No results found for: \"EAG\"  Lab Results   Component Value Date    CHOL 128 10/01/2022     Lab Results   Component Value Date    TRIG 166 (H) 10/01/2022     Lab Results   Component Value Date    HDL 56 08/22/2024    HDL 46 10/01/2022     No components found for: \"LDLCALC\", \"LDLCHOLESTEROL\"  Lab Results   Component Value Date    VLDL 17 08/22/2024    VLDL 33 10/01/2022     No results found for: \"CHOLHDLRATIO\"  Recent Labs     09/02/24  0242   PROBNP 2,970*       Cardiac Tests:    Echocardiogram, Date: 06/06/24 [facility]  - Exam indication: Shortness of Breath   - The left ventricle is normal in size. Left ventricular systolic function is   normal. EF = 53 ± 5% (2D biplane) Cubu-bi-dxmp variability in ventricular   contraction (AF).   - The right ventricle is normal in size. Right ventricular systolic function is normal.   - The left atrial cavity is dilated.   - Estimated right ventricular systolic pressure is likely underestimated due to a   weak or incomplete tricuspid regurgitation signal and is, at least, 18 mmHg plus   right atrial pressure. Estimated right atrial pressure is not included as the IVC was not seen.   - Small circumferential pericardial effusion without evidence of tamponade physiology.   - Exam was compared with the prior CC echocardiographic exam performed on   12/11/2023 ([facility]WNYA). Trivial to small pericardial effusion. Otherwise, no major   changes in  hyperlipidemia    RECOMMENDATIONS:    Volume status improving.  3.2 L negative since admission.  Got another dose of IV Lasix this morning  Continue Cardizem and Toprol-XL for rate control  No need for anticoagulation as he is status post watchman.  He has never had rhythm control strategy attempted.  Discussed proceeding with possible TAWNYA guided cardioversion.  At the chronicity of his atrial fibrillation, chances of success remain low but still with pursuing.  Discussed risks and benefits of procedure with patient.  He is agreeable and willing to proceed.  N.p.o. at midnight for cardioversion tomorrow.  Scheduled at 11:30 AM.  Will not need anticoagulation since he already has watchman in place.  Discharge in the next 24 to 48 hours  Will need maintenance diuretics on discharge.      Greater than 50 minutes was spent counseling the patient, reviewing the rationale for the above recommendations and reviewing the patient's current medication list, problem list and results of all previously ordered testing.        Nick Tracy MD, [facility]    NOTE: This report was transcribed using voice recognition software. Every effort was made to ensure accuracy; however, inadvertent computerized transcription errors may be present.

## 2024-09-03 NOTE — PROGRESS NOTES
[facility] Quality Flow/Interdisciplinary Rounds Progress Note        Quality Flow Rounds held on September 3, 2024    Disciplines Attending:  Bedside Nurse, , , and Nursing Unit Leadership    Guero Chacko was admitted on 9/2/2024  2:06 AM    Anticipated Discharge Date:       Disposition:    Sriram Score:  Srirma Scale Score: 22    Readmission Risk              Risk of Unplanned Readmission:  18           Discussed patient goal for the day, patient clinical progression, and barriers to discharge.  The following Goal(s) of the Day/Commitment(s) have been identified:   iv lasix, ileostomy-monitor output       Unique Llanes RN  September 3, 2024

## 2024-09-03 NOTE — FLOWSHEET NOTE
Initial ET Nurse  Admit Date: 9/2/2024  2:06 AM    Reason for consult:  ileostomy    Significant history:  surgery [facility], partial colectomy ileostomy June 2024    Stoma assessment:     09/03/24 1135   Ileostomy/Jejunostomy RLQ Ileostomy   Placement Date: 06/10/24   Location: RLQ  Ostomy Type: Ileostomy   Stomal Appliance Changed   Flange Size (inches)   (20x25 2 piece flex with barrier ring, and strips, belt)   Stoma  Assessment Moist;Protrudes;Red   Peristomal Assessment Intact   Mucocutaneous Junction Intact   Stool Appearance Loose   Stool Color Brown   Stool Amount Medium     Wife present, change pouch together  Patient alert and oriented. Emptied own pouch  Was using one piece convex at home        Plan:  will follow    Spring Smith RN 9/3/2024 11:37 AM

## 2024-09-03 NOTE — PROGRESS NOTES
GENERAL SURGERY  DAILY PROGRESS NOTE    Patient's Name/Date of Birth: Guero Chacko / 1955    Date: September 3, 2024     Chief Complaint   Patient presents with    Shortness of Breath     SOB while lying down, went to bed around 2130 tonight.     Cough        Subjective:  Pt is not passing gas, but had a BM. Denies nausea, vomiting, fevers or chills. Denies changes in his stool output.       Objective:  Last 24Hrs  Temp  Av °F (36.7 °C)  Min: 97.4 °F (36.3 °C)  Max: 98.7 °F (37.1 °C)  Resp  Av.9  Min: 11  Max: 20  Pulse  Av.2  Min: 70  Max: 125  Systolic (24hrs), Av , Min:147 , Max:196     Diastolic (24hrs), Av, Min:90, Max:115    SpO2  Av.6 %  Min: 94 %  Max: 99 %    I/O last 3 completed shifts:  In: -   Out: 3050 [Urine:2750; Stool:300]      General: In no acute distress, alert and oriented x4  Cardiovascular: Warm throughout, no edema  Respiratory: no respiratory distress, equal chest rise  Abdomen: soft, nontender, non-distended, stool in ostomy bag.   Skin: no obvious rashes or lesions appreciated  Extremities: atraumatic, no focal motor deficits      CBC  Recent Labs     24  0242 24  0445   WBC 7.2 6.5   RBC 4.28 4.54   HGB 11.4* 12.2*   HCT 36.4* 38.4   MCV 85.0 84.6   MCH 26.6 26.9   MCHC 31.3* 31.8*   RDW 13.8 13.5    175   MPV 9.9 9.6       CMP  Recent Labs     24  0242      K 5.3*      CO2 22   BUN 25*   CREATININE 1.1   GLUCOSE 170*   CALCIUM 9.0   BILITOT 0.5   ALKPHOS 91   AST 34   ALT 21         Assessment/Plan:    Patient Active Problem List   Diagnosis    Coronary atherosclerosis of native coronary artery    Hypertension    Hyperlipidemia    Diabetes mellitus (HCC)    Permanent atrial fibrillation (HCC)    History of echocardiogram    Adrenal adenoma    Testicular hypofunction    Ulcerative colitis (HCC)    History of tobacco use    H/O carotid endarterectomy    Iron deficiency anemia, unspecified    Pleural effusion,

## 2024-09-03 NOTE — PLAN OF CARE
Patient's chart updated to reflect:      .    - HF care plan, HF education points and HF discharge instructions.  -Orders: 2 gram sodium diet, daily weights, I/O.  -PCP and cardiology follow up appointments to be scheduled within 7 days of hospital discharge.  -CHF education session will be provided to the patient prior to hospital discharge.    Swati Brewer RN   Heart Failure Navigator

## 2024-09-03 NOTE — PROGRESS NOTES
Pulmonary Progress Note    Admit Date: 2024  Hospital day                               PCP: Cameron Torres MD    Chief Complaint (s):  Patient Active Problem List   Diagnosis    Coronary atherosclerosis of native coronary artery    Hypertension    Hyperlipidemia    Diabetes mellitus (HCC)    Permanent atrial fibrillation (HCC)    History of echocardiogram    Adrenal adenoma    Testicular hypofunction    Ulcerative colitis (HCC)    History of tobacco use    H/O carotid endarterectomy    Iron deficiency anemia, unspecified    Pleural effusion, bilateral    Iron deficiency anemia    Shortness of breath    Acute on chronic diastolic congestive heart failure (HCC)       Subjective:  Patient seen on room air. He has not ambulated much due to elevated heart rate. Patient states he is to have a cardioversion tomorrow. He denies any shortness of breath, cough, or chest pain.       Vitals:  VITALS:  BP (!) 150/95   Pulse (!) 121   Temp 98.2 °F (36.8 °C) (Oral)   Resp 20   Ht 1.854 m (6' 1\")   Wt 73 kg (161 lb)   SpO2 98%   BMI 21.24 kg/m²     24HR INTAKE/OUTPUT:      Intake/Output Summary (Last 24 hours) at 9/3/2024 1057  Last data filed at 9/3/2024 0545  Gross per 24 hour   Intake 180 ml   Output 1600 ml   Net -1420 ml       24HR PULSE OXIMETRY RANGE:    SpO2  Av.2 %  Min: 94 %  Max: 99 %    Medications:  IV:   sodium chloride      dextrose         Scheduled Meds:   sodium chloride flush  5-40 mL IntraVENous 2 times per day    enoxaparin  40 mg SubCUTAneous Daily    aspirin  81 mg Oral Daily    finasteride  5 mg Oral Daily    pravastatin  20 mg Oral Nightly    multivitamin  1 tablet Oral Daily    insulin lispro  0-8 Units SubCUTAneous TID     insulin lispro  0-4 Units SubCUTAneous Nightly    doxazosin  4 mg Oral QHS    insulin glargine  5 Units SubCUTAneous Nightly    metoprolol succinate  150 mg Oral Daily    dilTIAZem  120 mg Oral BID    hydrocortisone  100 mg Rectal Nightly  voice recognition technology was used in the preparation of this note and make therefore it may contain inadvertent transcription errors.  If the patient is a COVID 19 isolation patient, the above physical exam reflects that of the examining physician for the day.      WILIAM Loomis - PATRICK  Attestation    The patient was seen and personally examined.  History is obtained through the patient by myself.  Impression and plan are mine.    Good result with diuretic.  Lying flat and breathing easy.  Will hold off on instrumentation.  Awaiting cardioversion.    Documentation only provided per the nurse practitioner.    Additions and corrections are reflected in the signed note.    Hung Oakley MD,  M.D., F.C.C.P.    [facility]    [facility], [address] [place] [address], [address], [place] [address]  Office visits:  [address] [place] [address]

## 2024-09-03 NOTE — PROGRESS NOTES
Feels ok- no c/o  Vss- BP and HR still elevated  lungs clear  rhythm  irregular with elevated rate  abd-soft and non-tender  ext- no edema  Stable- persist elevated BP and pulse- lab pending  Per orders- adjust meds- continue present care- follow labs

## 2024-09-04 ENCOUNTER — HOSPITAL ENCOUNTER (INPATIENT)
Age: 69
Discharge: HOME | End: 2024-09-06
Payer: MEDICARE

## 2024-09-04 ENCOUNTER — ANESTHESIA EVENT (OUTPATIENT)
Age: 69
End: 2024-09-04
Payer: MEDICARE

## 2024-09-04 ENCOUNTER — ANESTHESIA (OUTPATIENT)
Age: 69
End: 2024-09-04
Payer: MEDICARE

## 2024-09-04 VITALS
OXYGEN SATURATION: 100 % | DIASTOLIC BLOOD PRESSURE: 70 MMHG | HEART RATE: 60 BPM | TEMPERATURE: 97.7 F | SYSTOLIC BLOOD PRESSURE: 136 MMHG | RESPIRATION RATE: 18 BRPM

## 2024-09-04 PROBLEM — E87.6 HYPOKALEMIA: Status: ACTIVE | Noted: 2024-09-04

## 2024-09-04 LAB
ANION GAP SERPL CALCULATED.3IONS-SCNC: 11 MMOL/L (ref 7–16)
BNP SERPL-MCNC: 4626 PG/ML (ref 0–125)
BUN SERPL-MCNC: 16 MG/DL (ref 6–23)
CALCIUM SERPL-MCNC: 8.8 MG/DL (ref 8.6–10.2)
CHLORIDE SERPL-SCNC: 105 MMOL/L (ref 98–107)
CO2 SERPL-SCNC: 24 MMOL/L (ref 22–29)
CREAT SERPL-MCNC: 1 MG/DL (ref 0.7–1.2)
ECHO BSA: 1.94 M2
GFR, ESTIMATED: 86 ML/MIN/1.73M2
GLUCOSE BLD-MCNC: 112 MG/DL (ref 74–99)
GLUCOSE BLD-MCNC: 115 MG/DL (ref 74–99)
GLUCOSE BLD-MCNC: 118 MG/DL (ref 74–99)
GLUCOSE BLD-MCNC: 125 MG/DL (ref 74–99)
GLUCOSE SERPL-MCNC: 127 MG/DL (ref 74–99)
MAGNESIUM SERPL-MCNC: 2.2 MG/DL (ref 1.6–2.6)
POTASSIUM SERPL-SCNC: 3.1 MMOL/L (ref 3.5–5)
POTASSIUM SERPL-SCNC: 3.9 MMOL/L (ref 3.5–5)
SODIUM SERPL-SCNC: 140 MMOL/L (ref 132–146)

## 2024-09-04 PROCEDURE — 84132 ASSAY OF SERUM POTASSIUM: CPT

## 2024-09-04 PROCEDURE — 7100000011 HC PHASE II RECOVERY - ADDTL 15 MIN

## 2024-09-04 PROCEDURE — 7100000010 HC PHASE II RECOVERY - FIRST 15 MIN

## 2024-09-04 PROCEDURE — 6360000002 HC RX W HCPCS: Performed by: NURSE ANESTHETIST, CERTIFIED REGISTERED

## 2024-09-04 PROCEDURE — 6360000002 HC RX W HCPCS

## 2024-09-04 PROCEDURE — 6370000000 HC RX 637 (ALT 250 FOR IP): Performed by: INTERNAL MEDICINE

## 2024-09-04 PROCEDURE — 93312 ECHO TRANSESOPHAGEAL: CPT | Performed by: INTERNAL MEDICINE

## 2024-09-04 PROCEDURE — 82962 GLUCOSE BLOOD TEST: CPT

## 2024-09-04 PROCEDURE — 92960 CARDIOVERSION ELECTRIC EXT: CPT

## 2024-09-04 PROCEDURE — 6360000002 HC RX W HCPCS: Performed by: FAMILY MEDICINE

## 2024-09-04 PROCEDURE — 92960 CARDIOVERSION ELECTRIC EXT: CPT | Performed by: INTERNAL MEDICINE

## 2024-09-04 PROCEDURE — 83735 ASSAY OF MAGNESIUM: CPT

## 2024-09-04 PROCEDURE — 6370000000 HC RX 637 (ALT 250 FOR IP): Performed by: FAMILY MEDICINE

## 2024-09-04 PROCEDURE — 2060000000 HC ICU INTERMEDIATE R&B

## 2024-09-04 PROCEDURE — 3700000000 HC ANESTHESIA ATTENDED CARE

## 2024-09-04 PROCEDURE — 6360000002 HC RX W HCPCS: Performed by: INTERNAL MEDICINE

## 2024-09-04 PROCEDURE — 93325 DOPPLER ECHO COLOR FLOW MAPG: CPT | Performed by: INTERNAL MEDICINE

## 2024-09-04 PROCEDURE — 2580000003 HC RX 258: Performed by: FAMILY MEDICINE

## 2024-09-04 PROCEDURE — 3700000001 HC ADD 15 MINUTES (ANESTHESIA)

## 2024-09-04 PROCEDURE — 83880 ASSAY OF NATRIURETIC PEPTIDE: CPT

## 2024-09-04 PROCEDURE — 93320 DOPPLER ECHO COMPLETE: CPT | Performed by: INTERNAL MEDICINE

## 2024-09-04 PROCEDURE — 5A2204Z RESTORATION OF CARDIAC RHYTHM, SINGLE: ICD-10-PCS | Performed by: INTERNAL MEDICINE

## 2024-09-04 PROCEDURE — 99233 SBSQ HOSP IP/OBS HIGH 50: CPT | Performed by: INTERNAL MEDICINE

## 2024-09-04 PROCEDURE — 2580000003 HC RX 258: Performed by: NURSE ANESTHETIST, CERTIFIED REGISTERED

## 2024-09-04 PROCEDURE — 36415 COLL VENOUS BLD VENIPUNCTURE: CPT

## 2024-09-04 PROCEDURE — 80048 BASIC METABOLIC PNL TOTAL CA: CPT

## 2024-09-04 RX ORDER — LABETALOL HYDROCHLORIDE 5 MG/ML
5 INJECTION, SOLUTION INTRAVENOUS
Status: CANCELLED | OUTPATIENT
Start: 2024-09-04

## 2024-09-04 RX ORDER — POTASSIUM CHLORIDE 1500 MG/1
40 TABLET, EXTENDED RELEASE ORAL ONCE
Status: COMPLETED | OUTPATIENT
Start: 2024-09-04 | End: 2024-09-04

## 2024-09-04 RX ORDER — MEPERIDINE HYDROCHLORIDE 25 MG/ML
12.5 INJECTION INTRAMUSCULAR; INTRAVENOUS; SUBCUTANEOUS ONCE
Status: CANCELLED | OUTPATIENT
Start: 2024-09-04 | End: 2024-09-04

## 2024-09-04 RX ORDER — DIPHENHYDRAMINE HYDROCHLORIDE 50 MG/ML
12.5 INJECTION INTRAMUSCULAR; INTRAVENOUS
Status: CANCELLED | OUTPATIENT
Start: 2024-09-04 | End: 2024-09-05

## 2024-09-04 RX ORDER — HYDRALAZINE HYDROCHLORIDE 20 MG/ML
5 INJECTION INTRAMUSCULAR; INTRAVENOUS
Status: CANCELLED | OUTPATIENT
Start: 2024-09-04

## 2024-09-04 RX ORDER — NALOXONE HYDROCHLORIDE 0.4 MG/ML
INJECTION, SOLUTION INTRAMUSCULAR; INTRAVENOUS; SUBCUTANEOUS PRN
Status: CANCELLED | OUTPATIENT
Start: 2024-09-04

## 2024-09-04 RX ORDER — SODIUM CHLORIDE 9 MG/ML
INJECTION, SOLUTION INTRAVENOUS PRN
Status: CANCELLED | OUTPATIENT
Start: 2024-09-04

## 2024-09-04 RX ORDER — FUROSEMIDE 10 MG/ML
20 INJECTION INTRAMUSCULAR; INTRAVENOUS 2 TIMES DAILY
Status: DISCONTINUED | OUTPATIENT
Start: 2024-09-04 | End: 2024-09-06 | Stop reason: HOSPADM

## 2024-09-04 RX ORDER — SODIUM CHLORIDE 0.9 % (FLUSH) 0.9 %
5-40 SYRINGE (ML) INJECTION EVERY 12 HOURS SCHEDULED
Status: CANCELLED | OUTPATIENT
Start: 2024-09-04

## 2024-09-04 RX ORDER — FENTANYL CITRATE 50 UG/ML
25 INJECTION, SOLUTION INTRAMUSCULAR; INTRAVENOUS EVERY 5 MIN PRN
Status: CANCELLED | OUTPATIENT
Start: 2024-09-04

## 2024-09-04 RX ORDER — FERROUS SULFATE 325(65) MG
325 TABLET ORAL 2 TIMES DAILY WITH MEALS
Status: DISCONTINUED | OUTPATIENT
Start: 2024-09-04 | End: 2024-09-05

## 2024-09-04 RX ORDER — SODIUM CHLORIDE 9 MG/ML
INJECTION, SOLUTION INTRAVENOUS CONTINUOUS PRN
Status: DISCONTINUED | OUTPATIENT
Start: 2024-09-04 | End: 2024-09-04 | Stop reason: SDUPTHER

## 2024-09-04 RX ORDER — SODIUM CHLORIDE 0.9 % (FLUSH) 0.9 %
5-40 SYRINGE (ML) INJECTION PRN
Status: CANCELLED | OUTPATIENT
Start: 2024-09-04

## 2024-09-04 RX ORDER — PROCHLORPERAZINE EDISYLATE 5 MG/ML
5 INJECTION INTRAMUSCULAR; INTRAVENOUS
Status: CANCELLED | OUTPATIENT
Start: 2024-09-04 | End: 2024-09-05

## 2024-09-04 RX ORDER — POTASSIUM CHLORIDE 1500 MG/1
20 TABLET, EXTENDED RELEASE ORAL 2 TIMES DAILY WITH MEALS
Status: DISCONTINUED | OUTPATIENT
Start: 2024-09-05 | End: 2024-09-06 | Stop reason: HOSPADM

## 2024-09-04 RX ORDER — PROPOFOL 10 MG/ML
INJECTION, EMULSION INTRAVENOUS PRN
Status: DISCONTINUED | OUTPATIENT
Start: 2024-09-04 | End: 2024-09-04 | Stop reason: SDUPTHER

## 2024-09-04 RX ADMIN — FERROUS SULFATE TAB 325 MG (65 MG ELEMENTAL FE) 325 MG: 325 (65 FE) TAB at 16:44

## 2024-09-04 RX ADMIN — Medication 1 TABLET: at 08:06

## 2024-09-04 RX ADMIN — METOPROLOL SUCCINATE 150 MG: 100 TABLET, EXTENDED RELEASE ORAL at 08:06

## 2024-09-04 RX ADMIN — HYDRALAZINE HYDROCHLORIDE 10 MG: 20 INJECTION INTRAMUSCULAR; INTRAVENOUS at 21:39

## 2024-09-04 RX ADMIN — ASPIRIN 81 MG: 81 TABLET, COATED ORAL at 08:06

## 2024-09-04 RX ADMIN — FUROSEMIDE 20 MG: 10 INJECTION, SOLUTION INTRAMUSCULAR; INTRAVENOUS at 13:13

## 2024-09-04 RX ADMIN — SODIUM CHLORIDE, PRESERVATIVE FREE 10 ML: 5 INJECTION INTRAVENOUS at 08:08

## 2024-09-04 RX ADMIN — FUROSEMIDE 20 MG: 10 INJECTION, SOLUTION INTRAMUSCULAR; INTRAVENOUS at 17:39

## 2024-09-04 RX ADMIN — PROPOFOL 200 MG: 10 INJECTION, EMULSION INTRAVENOUS at 11:47

## 2024-09-04 RX ADMIN — PRAVASTATIN SODIUM 20 MG: 20 TABLET ORAL at 21:39

## 2024-09-04 RX ADMIN — INSULIN GLARGINE 5 UNITS: 100 INJECTION, SOLUTION SUBCUTANEOUS at 21:39

## 2024-09-04 RX ADMIN — DILTIAZEM HYDROCHLORIDE 120 MG: 120 CAPSULE, COATED, EXTENDED RELEASE ORAL at 08:07

## 2024-09-04 RX ADMIN — POTASSIUM CHLORIDE 40 MEQ: 1500 TABLET, EXTENDED RELEASE ORAL at 06:40

## 2024-09-04 RX ADMIN — FERROUS SULFATE TAB 325 MG (65 MG ELEMENTAL FE) 325 MG: 325 (65 FE) TAB at 08:06

## 2024-09-04 RX ADMIN — SODIUM CHLORIDE, PRESERVATIVE FREE 10 ML: 5 INJECTION INTRAVENOUS at 21:47

## 2024-09-04 RX ADMIN — SODIUM CHLORIDE: 9 INJECTION, SOLUTION INTRAVENOUS at 11:40

## 2024-09-04 RX ADMIN — DOXAZOSIN 4 MG: 4 TABLET ORAL at 21:39

## 2024-09-04 RX ADMIN — FINASTERIDE 5 MG: 5 TABLET, FILM COATED ORAL at 08:07

## 2024-09-04 RX ADMIN — HYDROCORTISONE 100 MG: 100 ENEMA RECTAL at 21:40

## 2024-09-04 ASSESSMENT — PAIN SCALES - GENERAL: PAINLEVEL_OUTOF10: 0

## 2024-09-04 ASSESSMENT — PAIN - FUNCTIONAL ASSESSMENT
PAIN_FUNCTIONAL_ASSESSMENT: 0-10
PAIN_FUNCTIONAL_ASSESSMENT: 0-10
PAIN_FUNCTIONAL_ASSESSMENT: ACTIVITIES ARE NOT PREVENTED
PAIN_FUNCTIONAL_ASSESSMENT: 0-10

## 2024-09-04 ASSESSMENT — ENCOUNTER SYMPTOMS
DYSPNEA ACTIVITY LEVEL: NO INTERVAL CHANGE
SHORTNESS OF BREATH: 1

## 2024-09-04 ASSESSMENT — LIFESTYLE VARIABLES: SMOKING_STATUS: 0

## 2024-09-04 NOTE — ANESTHESIA PRE PROCEDURE
Topics    Alcohol use: Yes     Comment: occasionally                                Counseling given: Not Answered      Vital Signs (Current): There were no vitals filed for this visit.                                           BP Readings from Last 3 Encounters:   09/04/24 (!) 153/94   08/27/24 131/71   05/18/24 (!) 170/82       NPO Status:                                                                                 BMI:   Wt Readings from Last 3 Encounters:   09/04/24 74.7 kg (164 lb 11.2 oz)   08/27/24 73 kg (161 lb)   05/18/24 80.3 kg (177 lb)     There is no height or weight on file to calculate BMI.    CBC:   Lab Results   Component Value Date/Time    WBC 6.5 09/03/2024 04:45 AM    RBC 4.54 09/03/2024 04:45 AM    HGB 12.2 09/03/2024 04:45 AM    HCT 38.4 09/03/2024 04:45 AM    MCV 84.6 09/03/2024 04:45 AM    RDW 13.5 09/03/2024 04:45 AM     09/03/2024 04:45 AM       CMP:   Lab Results   Component Value Date/Time     09/04/2024 04:30 AM    K 3.1 09/04/2024 04:30 AM    K 4.0 09/30/2022 10:30 AM     09/04/2024 04:30 AM    CO2 24 09/04/2024 04:30 AM    BUN 16 09/04/2024 04:30 AM    CREATININE 1.0 09/04/2024 04:30 AM    GFRAA >60 10/04/2022 04:45 AM    LABGLOM 86 09/04/2024 04:30 AM    GLUCOSE 127 09/04/2024 04:30 AM    GLUCOSE 119 12/30/2010 04:50 AM    CALCIUM 8.8 09/04/2024 04:30 AM    BILITOT 0.6 09/03/2024 04:45 AM    ALKPHOS 76 09/03/2024 04:45 AM    AST 18 09/03/2024 04:45 AM    ALT 19 09/03/2024 04:45 AM       POC Tests:   Recent Labs     09/04/24  0645   POCGLU 112*       Coags:   Lab Results   Component Value Date/Time    PROTIME 13.6 10/03/2022 05:18 AM    INR 1.3 10/03/2022 05:18 AM    APTT 32.0 09/30/2022 10:30 AM       HCG (If Applicable): No results found for: \"PREGTESTUR\", \"PREGSERUM\", \"HCG\", \"HCGQUANT\"     ABGs: No results found for: \"PHART\", \"PO2ART\", \"CTT3UBB\", \"SKJ9DMQ\", \"BEART\", \"T8WYZSNI\"     Type & Screen (If Applicable):  No results found for: \"LABABO\"    Drug/Infectious

## 2024-09-04 NOTE — CARE COORDINATION
Pt w/CHF, afib w/cardiology, gen surg and pulm following. Cardioversion planned for today. CM met w/pt w/role of CM explained. Pt states he resides w/his spouse in a condo, no steps to enter and is independent w/o the use of any assistive devices. He is established w/Dr. Torres and uses [facility]. No home O2, cpap or nebulizer. No hx of SNF stay, but has used [facility] in the past. He declines any needs for CM and will return home w/spouse who will transport upon discharge.  Will follow.  BIANKA cMkeonN, RN  [facility] Case Management  [phone]

## 2024-09-04 NOTE — ACP (ADVANCE CARE PLANNING)
Advance Care Planning   Healthcare Decision Maker:    Primary Decision Maker: Fernanda Chacko - [facility] - [phone]      Today we documented Decision Maker(s) consistent with Legal Next of Kin hierarchy.

## 2024-09-04 NOTE — PROGRESS NOTES
RECEIVED HANDOFF OF CARE REPORT FROM LUIS ALBERTO RODRIGUEZ  AND ASSUMED CARE FOR LUNCH RELIEF , 12 LEAD EKG DONE AT THIS TIME   1235 PT AWAKE DENIES ANY PAIN OR DISCOMFORT , VSS , REPORT CALLED TO NITISH RODRIGUEZ FOR ROOM 641 TRANSPORT REQUEST PLACED  PT ON ROOM MONITOR, CALLED CMR

## 2024-09-04 NOTE — ANESTHESIA POSTPROCEDURE EVALUATION
Department of Anesthesiology  Postprocedure Note    Patient: Guero Chacko  MRN: [id number]  YOB: 1955  Date of evaluation: 9/4/2024    Procedure Summary       Date: 09/04/24 Room / Location: [facility]    Anesthesia Start: 1140 Anesthesia Stop: 1202    Procedure: TAWNYA W/ POSSIBLE CARDIOVERSION (PRN CONTRAST/BUBBLE/3D) Diagnosis: Persistent atrial fibrillation (HCC)    Scheduled Providers:  Responsible Provider: Raymundo Lazo DO    Anesthesia Type: MAC ASA Status: 4            Anesthesia Type: MAC    Blanche Phase I: Blanche Score: 10    Blanche Phase II: Blanche Score: 8    Anesthesia Post Evaluation    Patient location during evaluation: bedside  Patient participation: complete - patient participated  Level of consciousness: awake  Pain score: 1  Airway patency: patent  Nausea & Vomiting: no vomiting and no nausea  Cardiovascular status: hemodynamically stable  Respiratory status: acceptable  Hydration status: stable  Comments: Patient seen and examined.  Progressing as expected.  No anesthetic related questions or concerns at this time.  Multimodal analgesia pain management approach  Pain management: adequate    No notable events documented.

## 2024-09-04 NOTE — PROGRESS NOTES
While rounding unable to visit due to patient not being in the room at the time. Prayer card left in the room and  remains available for support.

## 2024-09-04 NOTE — PROGRESS NOTES
Pulmonary Progress Note    Admit Date: 2024  Hospital day                               PCP: Cameron Torres MD    Chief Complaint (s):  Patient Active Problem List   Diagnosis    Coronary atherosclerosis of native coronary artery    Hypertension    Hyperlipidemia    Diabetes mellitus (HCC)    Permanent atrial fibrillation (HCC)    History of echocardiogram    Adrenal adenoma    Testicular hypofunction    Ulcerative colitis (HCC)    History of tobacco use    H/O carotid endarterectomy    Iron deficiency anemia, unspecified    Pleural effusion, bilateral    Iron deficiency anemia    Shortness of breath    Acute on chronic diastolic congestive heart failure (HCC)    Hypokalemia       Subjective:  Patient seen on room air. He has complaints of dyspnea. Not much ambulation yet. For cardioversion today.        Vitals:  VITALS:  BP (!) 153/94   Pulse 78   Temp 97.7 °F (36.5 °C) (Oral)   Resp 18   Ht 1.854 m (6' 1\")   Wt 74.7 kg (164 lb 11.2 oz)   SpO2 98%   BMI 21.73 kg/m²     24HR INTAKE/OUTPUT:      Intake/Output Summary (Last 24 hours) at 2024 1041  Last data filed at 2024 0609  Gross per 24 hour   Intake 240 ml   Output 200 ml   Net 40 ml       24HR PULSE OXIMETRY RANGE:    SpO2  Av %  Min: 96 %  Max: 98 %    Medications:  IV:   sodium chloride      dextrose         Scheduled Meds:   ferrous sulfate  325 mg Oral BID     [START ON 2024] potassium chloride  20 mEq Oral BID     sodium chloride flush  5-40 mL IntraVENous 2 times per day    enoxaparin  40 mg SubCUTAneous Daily    aspirin  81 mg Oral Daily    finasteride  5 mg Oral Daily    pravastatin  20 mg Oral Nightly    multivitamin  1 tablet Oral Daily    insulin lispro  0-8 Units SubCUTAneous TID WC    insulin lispro  0-4 Units SubCUTAneous Nightly    doxazosin  4 mg Oral QHS    insulin glargine  5 Units SubCUTAneous Nightly    metoprolol succinate  150 mg Oral Daily    dilTIAZem  120 mg Oral BID    hydrocortisone  100 mg

## 2024-09-04 NOTE — PROGRESS NOTES
[facility] Quality Flow/Interdisciplinary Rounds Progress Note        Quality Flow Rounds held on September 4, 2024    Disciplines Attending:  Bedside Nurse, , , and Nursing Unit Leadership    Guero Chacko was admitted on 9/2/2024  2:06 AM    Anticipated Discharge Date:       Disposition:    Sriram Score:  Sriram Scale Score: 22    Readmission Risk              Risk of Unplanned Readmission:  19           Discussed patient goal for the day, patient clinical progression, and barriers to discharge.  The following Goal(s) of the Day/Commitment(s) have been identified:   iv lasix, TAWNYA/DCCV,       Unique Llanes RN  September 4, 2024

## 2024-09-04 NOTE — PROGRESS NOTES
Feels ok- no c/o  Vss  lungs clear  rhythm  irregular  abd- soft and non-tender  ext- no edema  Stable- lab noted  Per orders- supplement potassium and start iron- for cardioversion today

## 2024-09-04 NOTE — PROGRESS NOTES
INPATIENT CARDIOLOGY FOLLOW-UP    Name: Guero Chacko    Age: 68 y.o.    Date of Admission: 9/2/2024  2:06 AM    Date of Service: 9/4/2024    Primary Cardiologist: Dr. Skinner, [facility]    Chief Complaint: Follow-up for acute decompensated heart failure, atrial fibrillation with RVR    Interim History:  No new overnight cardiac complaints. Currently with no complaints of CP, SOB, palpitations, dizziness, or lightheadedness.     Atrial fibrillation on telemetry.  Heart rate in the 100s.  3.2 L negative since admission.  Feeling better overall.    Review of Systems:   Negative except as described above    Problem List:  Patient Active Problem List   Diagnosis    Coronary atherosclerosis of native coronary artery    Hypertension    Hyperlipidemia    Diabetes mellitus (HCC)    Permanent atrial fibrillation (HCC)    History of echocardiogram    Adrenal adenoma    Testicular hypofunction    Ulcerative colitis (HCC)    History of tobacco use    H/O carotid endarterectomy    Iron deficiency anemia, unspecified    Pleural effusion, bilateral    Iron deficiency anemia    Shortness of breath    Acute on chronic diastolic congestive heart failure (HCC)    Hypokalemia       Current Medications:    Current Facility-Administered Medications:     ferrous sulfate (IRON 325) tablet 325 mg, 325 mg, Oral, BID Brian SANDY Francis M, MD, 325 mg at 09/04/24 0806    [START ON 9/5/2024] potassium chloride (KLOR-CON M) extended release tablet 20 mEq, 20 mEq, Oral, BID Brian SANDY Francis M, MD    hydrALAZINE (APRESOLINE) injection 10 mg, 10 mg, IntraVENous, Q6H PRN, Cameron Torres MD, 10 mg at 09/03/24 2336    sodium chloride flush 0.9 % injection 5-40 mL, 5-40 mL, IntraVENous, 2 times per day, Cameron Torres MD, 10 mL at 09/04/24 0808    sodium chloride flush 0.9 % injection 5-40 mL, 5-40 mL, IntraVENous, PRN, Cameron Torres MD    0.9 % sodium chloride infusion, , IntraVENous, PRN, Cameron Torres MD    potassium  dysfunction seen on TAWNYA.  Mild to moderate RV dysfunction as well.  Full report to follow.  ----------------------------------------------------------------------------------------------------------------------------------------------------------------  IMPRESSION:  Acute decompensated heart failure-heart failure preserved ejection fraction  Bilateral pleural effusion  History of pericardial effusion  Peripheral arterial disease  Permanent atrial fibrillation status post watchman implantation  Ulcerative colitis  History of TIA  Hypertension, hyperlipidemia    RECOMMENDATIONS:    Volume status improving.  3.2 L negative since admission.  Will continue with IV Lasix for today.  Will discontinue Cardizem given LV dysfunction.  Continue with Toprol-XL at current doses.  Repeat limited echocardiogram tomorrow to reassess biventricular function.  No need for anticoagulation as he is status post watchman.  Device appears well-seated without any evidence of peridevice leak or device related thrombus.  Monitor on telemetry overnight.  Reassess for discharge in the morning.  Should he have persistent systolic dysfunction, will introduce GDMT      Greater than 50 minutes was spent counseling the patient, reviewing the rationale for the above recommendations and reviewing the patient's current medication list, problem list and results of all previously ordered testing.        Nick Tracy MD, [facility]    NOTE: This report was transcribed using voice recognition software. Every effort was made to ensure accuracy; however, inadvertent computerized transcription errors may be present.

## 2024-09-05 ENCOUNTER — APPOINTMENT (OUTPATIENT)
Dept: GENERAL RADIOLOGY | Age: 69
End: 2024-09-05
Payer: MEDICARE

## 2024-09-05 ENCOUNTER — APPOINTMENT (OUTPATIENT)
Age: 69
End: 2024-09-05
Attending: INTERNAL MEDICINE
Payer: MEDICARE

## 2024-09-05 PROBLEM — I50.21 ACUTE SYSTOLIC CONGESTIVE HEART FAILURE (HCC): Status: ACTIVE | Noted: 2024-09-05

## 2024-09-05 LAB
ANION GAP SERPL CALCULATED.3IONS-SCNC: 11 MMOL/L (ref 7–16)
BUN SERPL-MCNC: 16 MG/DL (ref 6–23)
CALCIUM SERPL-MCNC: 8.9 MG/DL (ref 8.6–10.2)
CHLORIDE SERPL-SCNC: 104 MMOL/L (ref 98–107)
CO2 SERPL-SCNC: 23 MMOL/L (ref 22–29)
CREAT SERPL-MCNC: 0.9 MG/DL (ref 0.7–1.2)
ECHO BSA: 1.94 M2
ECHO LA DIAMETER INDEX: 2.02 CM/M2
ECHO LA DIAMETER: 4 CM
ECHO LV EDV A2C: 157 ML
ECHO LV EDV A4C: 145 ML
ECHO LV EDV BP: 152 ML (ref 67–155)
ECHO LV EDV INDEX A4C: 73 ML/M2
ECHO LV EDV INDEX BP: 77 ML/M2
ECHO LV EDV NDEX A2C: 79 ML/M2
ECHO LV EF PHYSICIAN: 45 %
ECHO LV EJECTION FRACTION A2C: 47 %
ECHO LV EJECTION FRACTION A4C: 46 %
ECHO LV EJECTION FRACTION BIPLANE: 47 % (ref 55–100)
ECHO LV ESV A2C: 84 ML
ECHO LV ESV A4C: 78 ML
ECHO LV ESV BP: 81 ML (ref 22–58)
ECHO LV ESV INDEX A2C: 42 ML/M2
ECHO LV ESV INDEX A4C: 39 ML/M2
ECHO LV ESV INDEX BP: 41 ML/M2
ECHO LV FRACTIONAL SHORTENING: 25 % (ref 28–44)
ECHO LV INTERNAL DIMENSION DIASTOLE INDEX: 2.78 CM/M2
ECHO LV INTERNAL DIMENSION DIASTOLIC: 5.5 CM (ref 4.2–5.9)
ECHO LV INTERNAL DIMENSION SYSTOLIC INDEX: 2.07 CM/M2
ECHO LV INTERNAL DIMENSION SYSTOLIC: 4.1 CM
ECHO LV IVSD: 1.2 CM (ref 0.6–1)
ECHO LV IVSS: 1.4 CM
ECHO LV MASS 2D: 288.2 G (ref 88–224)
ECHO LV MASS INDEX 2D: 145.5 G/M2 (ref 49–115)
ECHO LV POSTERIOR WALL DIASTOLIC: 1.3 CM (ref 0.6–1)
ECHO LV POSTERIOR WALL SYSTOLIC: 1.6 CM
ECHO LV RELATIVE WALL THICKNESS RATIO: 0.47
ECHO TV REGURGITANT MAX VELOCITY: 2.11 M/S
ECHO TV REGURGITANT PEAK GRADIENT: 18 MMHG
GFR, ESTIMATED: 87 ML/MIN/1.73M2
GLUCOSE BLD-MCNC: 146 MG/DL (ref 74–99)
GLUCOSE BLD-MCNC: 186 MG/DL (ref 74–99)
GLUCOSE BLD-MCNC: 201 MG/DL (ref 74–99)
GLUCOSE BLD-MCNC: 208 MG/DL (ref 74–99)
GLUCOSE SERPL-MCNC: 149 MG/DL (ref 74–99)
MAGNESIUM SERPL-MCNC: 2.1 MG/DL (ref 1.6–2.6)
POTASSIUM SERPL-SCNC: 3.6 MMOL/L (ref 3.5–5)
SODIUM SERPL-SCNC: 138 MMOL/L (ref 132–146)

## 2024-09-05 PROCEDURE — 2060000000 HC ICU INTERMEDIATE R&B

## 2024-09-05 PROCEDURE — 6370000000 HC RX 637 (ALT 250 FOR IP): Performed by: INTERNAL MEDICINE

## 2024-09-05 PROCEDURE — 83735 ASSAY OF MAGNESIUM: CPT

## 2024-09-05 PROCEDURE — 82962 GLUCOSE BLOOD TEST: CPT

## 2024-09-05 PROCEDURE — 93308 TTE F-UP OR LMTD: CPT | Performed by: INTERNAL MEDICINE

## 2024-09-05 PROCEDURE — 93308 TTE F-UP OR LMTD: CPT

## 2024-09-05 PROCEDURE — 99233 SBSQ HOSP IP/OBS HIGH 50: CPT | Performed by: INTERNAL MEDICINE

## 2024-09-05 PROCEDURE — 2580000003 HC RX 258: Performed by: FAMILY MEDICINE

## 2024-09-05 PROCEDURE — 71046 X-RAY EXAM CHEST 2 VIEWS: CPT

## 2024-09-05 PROCEDURE — 6360000002 HC RX W HCPCS: Performed by: INTERNAL MEDICINE

## 2024-09-05 PROCEDURE — 2580000003 HC RX 258: Performed by: INTERNAL MEDICINE

## 2024-09-05 PROCEDURE — 6370000000 HC RX 637 (ALT 250 FOR IP): Performed by: FAMILY MEDICINE

## 2024-09-05 PROCEDURE — 80048 BASIC METABOLIC PNL TOTAL CA: CPT

## 2024-09-05 PROCEDURE — 6360000002 HC RX W HCPCS: Performed by: FAMILY MEDICINE

## 2024-09-05 PROCEDURE — 36415 COLL VENOUS BLD VENIPUNCTURE: CPT

## 2024-09-05 PROCEDURE — 93325 DOPPLER ECHO COLOR FLOW MAPG: CPT | Performed by: INTERNAL MEDICINE

## 2024-09-05 PROCEDURE — 93321 DOPPLER ECHO F-UP/LMTD STD: CPT | Performed by: INTERNAL MEDICINE

## 2024-09-05 RX ADMIN — HYDROCORTISONE 100 MG: 100 ENEMA RECTAL at 19:28

## 2024-09-05 RX ADMIN — FUROSEMIDE 20 MG: 10 INJECTION, SOLUTION INTRAMUSCULAR; INTRAVENOUS at 17:17

## 2024-09-05 RX ADMIN — SODIUM CHLORIDE, PRESERVATIVE FREE 10 ML: 5 INJECTION INTRAVENOUS at 19:30

## 2024-09-05 RX ADMIN — SODIUM CHLORIDE 100 MG: 9 INJECTION, SOLUTION INTRAVENOUS at 11:54

## 2024-09-05 RX ADMIN — METOPROLOL SUCCINATE 150 MG: 100 TABLET, EXTENDED RELEASE ORAL at 09:09

## 2024-09-05 RX ADMIN — FINASTERIDE 5 MG: 5 TABLET, FILM COATED ORAL at 09:09

## 2024-09-05 RX ADMIN — SACUBITRIL AND VALSARTAN 1 TABLET: 49; 51 TABLET, FILM COATED ORAL at 13:11

## 2024-09-05 RX ADMIN — ASPIRIN 81 MG: 81 TABLET, COATED ORAL at 09:09

## 2024-09-05 RX ADMIN — FUROSEMIDE 20 MG: 10 INJECTION, SOLUTION INTRAMUSCULAR; INTRAVENOUS at 09:09

## 2024-09-05 RX ADMIN — SODIUM CHLORIDE 25 MG: 9 INJECTION, SOLUTION INTRAVENOUS at 10:08

## 2024-09-05 RX ADMIN — SACUBITRIL AND VALSARTAN 1 TABLET: 49; 51 TABLET, FILM COATED ORAL at 19:28

## 2024-09-05 RX ADMIN — HYDRALAZINE HYDROCHLORIDE 10 MG: 20 INJECTION INTRAMUSCULAR; INTRAVENOUS at 04:05

## 2024-09-05 RX ADMIN — SODIUM CHLORIDE, PRESERVATIVE FREE 10 ML: 5 INJECTION INTRAVENOUS at 09:09

## 2024-09-05 RX ADMIN — POTASSIUM CHLORIDE 20 MEQ: 1500 TABLET, EXTENDED RELEASE ORAL at 09:09

## 2024-09-05 RX ADMIN — DOXAZOSIN 4 MG: 4 TABLET ORAL at 19:28

## 2024-09-05 RX ADMIN — PRAVASTATIN SODIUM 20 MG: 20 TABLET ORAL at 19:28

## 2024-09-05 RX ADMIN — INSULIN GLARGINE 5 UNITS: 100 INJECTION, SOLUTION SUBCUTANEOUS at 19:29

## 2024-09-05 RX ADMIN — POTASSIUM CHLORIDE 20 MEQ: 1500 TABLET, EXTENDED RELEASE ORAL at 17:17

## 2024-09-05 RX ADMIN — Medication 1 TABLET: at 09:09

## 2024-09-05 RX ADMIN — INSULIN LISPRO 2 UNITS: 100 INJECTION, SOLUTION INTRAVENOUS; SUBCUTANEOUS at 11:50

## 2024-09-05 NOTE — PROGRESS NOTES
Physician Progress Note      PATIENT:               IVANNA MC  [facility] #:                  [id number]  :                       1955  ADMIT DATE:       2024 2:06 AM  DISCH DATE:  RESPONDING  PROVIDER #:        Cameron Torres MD          QUERY TEXT:    Patient with CHF noted to have CAD and HTN. If possible, please document in   progress notes and discharge summary the etiology of CHF, if able to be   determined.    The medical record reflects the following:  Risk Factors: CAD, HTN  Clinical Indicators: Per notes, Acute decompensated heart failure-heart   failure preserved ejection fraction. Hypertension. Mild non-obstructive CAD   (based on [facility] in )  Treatment: IV Lasix, imaging, labs    Thank You,  Chelle Tolbert, RN, BSN, CRCR, Clinical Documentation Improvement  Options provided:  -- CHF due to Hypertensive Heart Disease  -- CHF due to Hypertensive Heart Disease and CAD  -- CHF not due to Hypertension but due to CAD  -- Other - I will add my own diagnosis  -- Disagree - Not applicable / Not valid  -- Disagree - Clinically unable to determine / Unknown  -- Refer to Clinical Documentation Reviewer    PROVIDER RESPONSE TEXT:    You should clarify with cardiology    Query created by: Chelle Tolbert on 2024 7:34 AM      Electronically signed by:  Cameron Torres MD 2024 10:27 AM

## 2024-09-05 NOTE — PROGRESS NOTES
Pulmonary Progress Note    Admit Date: 2024  Hospital day                               PCP: Cameron Torres MD    Chief Complaint (s):  Patient Active Problem List   Diagnosis    Coronary atherosclerosis of native coronary artery    Hypertension    Hyperlipidemia    Diabetes mellitus (HCC)    Permanent atrial fibrillation (HCC)    History of echocardiogram    Adrenal adenoma    Testicular hypofunction    Ulcerative colitis (HCC)    History of tobacco use    H/O carotid endarterectomy    Iron deficiency anemia, unspecified    Pleural effusion, bilateral    Iron deficiency anemia    Shortness of breath    Acute on chronic diastolic congestive heart failure (HCC)    Hypokalemia    Acute systolic congestive heart failure (HCC)       Subjective:  Patient seen on room air. He feels better with his heart in rhythm. Admits his dyspnea has improved. He is maintaining sinus rhythm with heart rate in the 90s. Outputs inaccurate.       Vitals:  VITALS:  BP (!) 143/79   Pulse 94   Temp 97.7 °F (36.5 °C) (Oral)   Resp 18   Ht 1.854 m (6' 1\")   Wt 74.7 kg (164 lb 11.2 oz)   SpO2 98%   BMI 21.73 kg/m²     24HR INTAKE/OUTPUT:      Intake/Output Summary (Last 24 hours) at 2024 1041  Last data filed at 2024 1026  Gross per 24 hour   Intake 55.53 ml   Output --   Net 55.53 ml       24HR PULSE OXIMETRY RANGE:    SpO2  Av.6 %  Min: 96 %  Max: 100 %    Medications:  IV:   sodium chloride      dextrose         Scheduled Meds:   ferric gluconate (FERRLECIT) 25 mg in sodium chloride 0.9 % 50 mL (test dose) IVPB  25 mg IntraVENous Once    Followed by    ferric gluconate (FERRLECIT) 100 mg in sodium chloride 0.9 % 100 mL IVPB  100 mg IntraVENous Once    Followed by    [START ON 2024] ferric gluconate (FERRLECIT) 125 mg in sodium chloride 0.9 % 100 mL IVPB  125 mg IntraVENous Once    potassium chloride  20 mEq Oral BID WC    furosemide  20 mg IntraVENous BID    sodium chloride flush  5-40 mL IntraVENous  2 times per day    enoxaparin  40 mg SubCUTAneous Daily    aspirin  81 mg Oral Daily    finasteride  5 mg Oral Daily    pravastatin  20 mg Oral Nightly    multivitamin  1 tablet Oral Daily    insulin lispro  0-8 Units SubCUTAneous TID WC    insulin lispro  0-4 Units SubCUTAneous Nightly    doxazosin  4 mg Oral QHS    insulin glargine  5 Units SubCUTAneous Nightly    metoprolol succinate  150 mg Oral Daily    hydrocortisone  100 mg Rectal Nightly       Diet:   ADULT DIET; Regular; 5 carb choices (75 gm/meal); Low Fat/Low Chol/High Fiber/UNIQUE       EXAM:  General: No distress. Alert.  Eyes: PERRL. No sclera icterus. No conjunctival injection.  ENT: No discharge. Pharynx clear.  Neck: Trachea midline. Normal thyroid.  Resp: No accessory muscle use. no rales. no wheezing. no rhonchi. Diminished bases R>L.   CV: Regular rate. Regular rhythm. No murmur or rub.   Abd: Non-tender. Non-distended. No masses. No organomegaly. Normal bowel sounds.   Skin: Warm and dry. No nodule on exposed extremities. No rash on exposed extremities.  Ext: No cyanosis, clubbing, edema  Lymph: No cervical LAD. No supraclavicular LAD.   M/S: No cyanosis. No joint deformity. No clubbing.   Neuro: Awake. Follows commands. Positive pupils/gag/corneals. Normal pain response.           Results:  CBC:   Recent Labs     09/03/24 0445   WBC 6.5   HGB 12.2*   HCT 38.4   MCV 84.6        BMP:   Recent Labs     09/03/24  0445 09/04/24  0430 09/04/24  1442 09/05/24  0412    140  --  138   K 3.5 3.1* 3.9 3.6    105  --  104   CO2 23 24  --  23   BUN 16 16  --  16   CREATININE 0.9 1.0  --  0.9     LIVER PROFILE:   Recent Labs     09/03/24 0445   AST 18   ALT 19   BILITOT 0.6   ALKPHOS 76     PT/INR: No results for input(s): \"PROTIME\", \"INR\" in the last 72 hours.  APTT: No results for input(s): \"APTT\" in the last 72 hours.      Pathology:  N/A      Microbiology:  None    Recent ABG:   No results for input(s): \"PH\", \"PO2\", \"PCO2\", \"HCO3\",

## 2024-09-05 NOTE — PROGRESS NOTES
Physician Progress Note      PATIENT:               IVANNA MC  [facility] #:                  [id number]  :                       1955  ADMIT DATE:       2024 2:06 AM  DISCH DATE:  RESPONDING  PROVIDER #:        Nick Tracy MD          QUERY TEXT:    Patient with CHF noted to have CAD and HTN. If possible, please document in   progress notes and discharge summary the etiology of CHF, if able to be   determined.    The medical record reflects the following:  Risk Factors: CAD, HTN  Clinical Indicators: Per notes, Acute decompensated heart failure-heart   failure preserved ejection fraction. Hypertension. Mild non-obstructive CAD   (based on [facility] in )  Treatment: IV Lasix, imaging, labs    Thank You,  Chelle Tolbert RN, BSN, CRCR, Clinical Documentation Improvement  Options provided:  -- CHF due to Hypertensive Heart Disease  -- CHF due to Hypertensive Heart Disease and CAD  -- CHF not due to Hypertension but due to CAD  -- Other - I will add my own diagnosis  -- Disagree - Not applicable / Not valid  -- Disagree - Clinically unable to determine / Unknown  -- Refer to Clinical Documentation Reviewer    PROVIDER RESPONSE TEXT:    This patient has CHF due to hypertensive heart disease.    Query created by: Chelle Tolbert on 2024 11:29 AM      Electronically signed by:  Nick Tracy MD 2024 11:59 AM

## 2024-09-05 NOTE — CONSULTS
Yuri Ames [facility]   Inpatient CHF Nurse Navigator Consult      Cardiologist: Dr Brody Chacko is a 68 y.o. (1955) male with a history of HFpEF, most recent EF:  Lab Results   Component Value Date    LVEF 50 10/02/2022    LVEFMODE Nuclear Medicine Scan 04/21/2008       Patient was awake and alert, laying in bed during the consultation and is agreeable to heart failure education. He was engaged and asked appropriate questions throughout the education session. He is feeling a little better today.     Barriers identified during consult contributing to HF Hospitalization:  [] Limited medication adherence   [] Poor health literacy, education regarding HF medications provided   [] Pill box provided to patient  [] Difficulty affording medications  [] Difficulty obtaining/ managing medications  [] Prescription assistance information given     [] Not weighing themselves daily  [x] Weight log provided for easy monitoring  [] Scale provided     [] Not following low sodium diet  [] Food insecurity   [x] 2 gram sodium diet education provided   [] Low sodium recipes provided  [] Sodium free seasoning provided   [] Low sodium meal delivery options given to patient  [] Dietician consulted     [] Lack of transportation to appointments     [] Depression, given chronic illness  [] Primary team notified     [] Goals of care need addressed  [] Palliative care consulted     [] CHF CHW consulted, to assist with     Chart Reviewed:  Diet: ADULT DIET; Regular; 5 carb choices (75 gm/meal); Low Fat/Low Chol/High Fiber/UNIQUE   Daily Weights: Patient Vitals for the past 96 hrs (Last 3 readings):   Weight   09/04/24 0600 74.7 kg (164 lb 11.2 oz)   09/02/24 0204 73 kg (161 lb)     I/O:   Intake/Output Summary (Last 24 hours) at 9/5/2024 1101  Last data filed at 9/5/2024 1026  Gross per 24 hour   Intake 55.53 ml   Output --   Net 55.53 ml       [] Nursing staff/manager notified of inaccurate lim weights  or I/O        Discharge Plan:  Above identified barriers reviewed and needs addressed    Patient/family educated on daily monitoring tools for CHF, made aware of signs and symptoms of worsening HF and to notify provider immediately of change in symptoms.     Heart Failure Home Instructions placed in patient's discharge instructions    Per AHA guidelines patient to be closely monitored following discharge with 7 day follow up appointment    Scheduling with the CHF clinic {Blank single:[id number]::\"Already follows, post hospital appointment made\",\"New consult to CHF clinic, appointment scheduled\",\"Patient deferring at this time\",\"Not eligible candidate for CHF clinic\"}    Future Appointments   Date Time Provider Department Center   9/12/2024  9:45 AM Jimmie Skinner MD [place] [facility]   10/22/2024  8:15 AM Jimmie Skinner MD [place] [facility]   5/13/2025 11:30 AM EVELINA GUIDO  [facility]   5/13/2025 11:45 AM Christian Chavez MD [facility]     Patient Education:  Self Monitoring/management:  Reviewed the introduction to Heart Failure, the HF zones, signs and symptoms to report on day 1 of onset, medications, medication compliance, the importance of obtaining daily weights, following a low sodium diet, reading food labels for the sodium content, keeping physician appointments, and smoking cessation.  Discussed writing / tracking daily weights on a calendar / log because a 5 pound gain in 1 week can sneak up if you are not tracking it.   Advised patient they can reduce the risk for Heart Failure exacerbations by modifying / controlling the risk factors.  Discussed self-managed care which includes the following: take medications as prescribed, report any intolerable side effects of medications to the medical provider (PCP or cardiologist), do not just stop taking the medication; follow a cardiac heart healthy / low sodium diet; weigh yourself daily, exercise regularly- per doctor recommendation and not to

## 2024-09-05 NOTE — CONSULTS
Department of Medicine  Division of Hematology/Oncology  Attending Consult Note      Reason for Consult:  SOFI  Requesting Physician:  Dr. Cameron Torres    CHIEF COMPLAINT:  Shortness of breath    History Obtained From:  Patient and EMR    HISTORY OF PRESENT ILLNESS:      The patient is a 68 y.o. male with significant past medical history of SOFI, CAD, HTN, Hyperlipidemia, DM, A-fib, ulcerative colitis s/p partial colectomy in June, and Acute systolic CHF who presents with shortness of breath, increased with lying down. On presentation to the ER he was found to be in A-fib RVR and CHF with pleural effusions. He follows with cardiology who recently started him on Lasix 40 mg every other day. He takes oral iron supplements but feels its not enough. Kulwant panel on 8/28/24 reveals Hgb 10.9, Iron 33, % sat 14. Denies any  blood loss or any easy bruising. He states he has some blood loss due to his colitis but its not a lot and he follows with GI at [facility].  Denies any headaches, blurry vision, dizziness, lightheadedness.  Denies any chest pain, palpitations, shortness of breath, dyspnea with exertion.  Denies any abdominal pain, nausea, vomiting, diarrhea or constipation.  Denies any lymph node swellings, lumps, bumps, fevers, chills, night sweats or unintentional weight loss.    Past Medical History:        Diagnosis Date    SHIVAM (cerebral atherosclerosis)     Coronary atherosclerosis of native coronary artery     COVID     Diabetes mellitus (HCC)     Dizziness     History of tobacco use 5/19/2022    Hyperlipidemia     Hypertension     Left carotid stenosis 5/19/2022    Ulcerative colitis (HCC)        Past Surgical History:        Procedure Laterality Date    CAROTID ENDARTERECTOMY Left 10/03/2022    CAROTID ENDARTERECTOMY [place] [facility]    COLONOSCOPY      DIAGNOSTIC CARDIAC CATH LAB PROCEDURE      SIGMOIDOSCOPY      SIGMOIDOSCOPY         Current Medications:    Current  Facility-Administered Medications: potassium chloride (KLOR-CON M) extended release tablet 20 mEq, 20 mEq, Oral, BID WC  furosemide (LASIX) injection 20 mg, 20 mg, IntraVENous, BID  hydrALAZINE (APRESOLINE) injection 10 mg, 10 mg, IntraVENous, Q6H PRN  sodium chloride flush 0.9 % injection 5-40 mL, 5-40 mL, IntraVENous, 2 times per day  sodium chloride flush 0.9 % injection 5-40 mL, 5-40 mL, IntraVENous, PRN  0.9 % sodium chloride infusion, , IntraVENous, PRN  potassium chloride (KLOR-CON M) extended release tablet 40 mEq, 40 mEq, Oral, PRN **OR** potassium bicarb-citric acid (EFFER-K) effervescent tablet 40 mEq, 40 mEq, Oral, PRN **OR** potassium chloride 10 mEq/100 mL IVPB (Peripheral Line), 10 mEq, IntraVENous, PRN  magnesium sulfate 2000 mg in 50 mL IVPB premix, 2,000 mg, IntraVENous, PRN  enoxaparin (LOVENOX) injection 40 mg, 40 mg, SubCUTAneous, Daily  ondansetron (ZOFRAN-ODT) disintegrating tablet 4 mg, 4 mg, Oral, Q8H PRN **OR** ondansetron (ZOFRAN) injection 4 mg, 4 mg, IntraVENous, Q6H PRN  polyethylene glycol (GLYCOLAX) packet 17 g, 17 g, Oral, Daily PRN  acetaminophen (TYLENOL) tablet 650 mg, 650 mg, Oral, Q6H PRN **OR** acetaminophen (TYLENOL) suppository 650 mg, 650 mg, Rectal, Q6H PRN  glucose chewable tablet 16 g, 4 tablet, Oral, PRN  dextrose bolus 10% 125 mL, 125 mL, IntraVENous, PRN **OR** dextrose bolus 10% 250 mL, 250 mL, IntraVENous, PRN  glucagon injection 1 mg, 1 mg, SubCUTAneous, PRN  dextrose 10 % infusion, , IntraVENous, Continuous PRN  aspirin EC tablet 81 mg, 81 mg, Oral, Daily  finasteride (PROSCAR) tablet 5 mg, 5 mg, Oral, Daily  pravastatin (PRAVACHOL) tablet 20 mg, 20 mg, Oral, Nightly  therapeutic multivitamin-minerals 1 tablet, 1 tablet, Oral, Daily  insulin lispro (HUMALOG,ADMELOG) injection vial 0-8 Units, 0-8 Units, SubCUTAneous, TID WC  insulin lispro (HUMALOG,ADMELOG) injection vial 0-4 Units, 0-4 Units, SubCUTAneous, Nightly  doxazosin (CARDURA) tablet 4 mg, 4 mg, Oral,

## 2024-09-05 NOTE — PROGRESS NOTES
Feels ok- no c/o  Vss- BP remains elevated  lungs clear reg rhythm  abd- soft and non-tender  ext- no edema  Stable- lab noted- decreased EF on echo- persist elevated BP  Per orders- continue present care- follow labs- await med adjustments per cardiology

## 2024-09-05 NOTE — PROGRESS NOTES
Patient very hypertensive at this time. Not due for hydralazine dose. Messaged Dr. Tracy. Awaiting response at this time.     Dr. Tracy read message, however no new orders at this time. Will continue to monitor.

## 2024-09-05 NOTE — PLAN OF CARE
Problem: Pain  Goal: Verbalizes/displays adequate comfort level or baseline comfort level  9/5/2024 1029 by Jadyn Nunes RN  Outcome: Progressing  9/4/2024 2208 by Hung Duenas RN  Outcome: Progressing     Problem: ABCDS Injury Assessment  Goal: Absence of physical injury  9/5/2024 1029 by Jadyn Nunes RN  Outcome: Progressing  9/4/2024 2208 by Hung Duenas RN  Outcome: Progressing     Problem: Safety - Adult  Goal: Free from fall injury  9/5/2024 1029 by Jadyn Nunes RN  Outcome: Progressing  9/4/2024 2208 by Hung Duenas RN  Outcome: Progressing     Problem: Chronic Conditions and Co-morbidities  Goal: Patient's chronic conditions and co-morbidity symptoms are monitored and maintained or improved  9/5/2024 1029 by Jadyn Nunes RN  Outcome: Progressing  9/4/2024 2208 by Hung Duenas RN  Outcome: Progressing

## 2024-09-05 NOTE — PROGRESS NOTES
INPATIENT CARDIOLOGY FOLLOW-UP    Name: Guero Chacko    Age: 68 y.o.    Date of Admission: 9/2/2024  2:06 AM    Date of Service: 9/5/2024    Primary Cardiologist: Dr. Skinner, [facility]    Chief Complaint: Follow-up for acute decompensated heart failure, atrial fibrillation with RVR    Interim History:  No new overnight cardiac complaints. Currently with no complaints of CP, SOB, palpitations, dizziness, or lightheadedness.     Remains in sinus rhythm.  No recurrence of atrial fibrillation.  Feeling significantly better.    Urine output and accurately documented    Review of Systems:   Negative except as described above    Problem List:  Patient Active Problem List   Diagnosis    Coronary atherosclerosis of native coronary artery    Hypertension    Hyperlipidemia    Diabetes mellitus (HCC)    Permanent atrial fibrillation (HCC)    History of echocardiogram    Adrenal adenoma    Testicular hypofunction    Ulcerative colitis (HCC)    History of tobacco use    H/O carotid endarterectomy    Iron deficiency anemia, unspecified    Pleural effusion, bilateral    Iron deficiency anemia    Shortness of breath    Acute on chronic diastolic congestive heart failure (HCC)    Hypokalemia    Acute systolic congestive heart failure (HCC)       Current Medications:    Current Facility-Administered Medications:     potassium chloride (KLOR-CON M) extended release tablet 20 mEq, 20 mEq, Oral, BID WC, Cameron Torres MD    furosemide (LASIX) injection 20 mg, 20 mg, IntraVENous, BID, Nick Tracy MD, 20 mg at 09/04/24 1739    hydrALAZINE (APRESOLINE) injection 10 mg, 10 mg, IntraVENous, Q6H PRN, Cameron Torres MD, 10 mg at 09/05/24 0405    sodium chloride flush 0.9 % injection 5-40 mL, 5-40 mL, IntraVENous, 2 times per day, Cameron Torres MD, 10 mL at 09/04/24 2147    sodium chloride flush 0.9 % injection 5-40 mL, 5-40 mL, IntraVENous, PRN, Cameron Torres MD    0.9 % sodium chloride infusion, , IntraVENous,  atrial pressure is not included as the IVC was not seen.   - Small circumferential pericardial effusion without evidence of tamponade physiology.   - Exam was compared with the prior  echocardiographic exam performed on   12/11/2023 (TAWNYA). Trivial to small pericardial effusion. Otherwise, no major   changes in echocardiographic findings.        Echo 10/2/2022   Summary   Left ventricle is normal in size .   Normal left ventricle wall thickness with borderline proximal septal   thickening / hypertrophy.   No gross regional wall motion abnormality.   Ejection fraction is visually estimated at 50%.   Indeterminate diastolic function.   Normal right ventricular size and function.   Normal sized left atrium ( by volume index ).   Interatrial septum appears intact.   Agitated saline injected for shunt evaluation showed no evidence of shunt   at the level of the interatrial septum.   Focal calcification mitral valve leaflets. Mild mitral annular calcification.   Trace mitral regurgitation.   Focal calcification mitral valve leaflets.   Mild mitral annular calcification.   Trace mitral regurgitation.   The aortic valve appears mildly sclerotic. No hemodynamically significant aortic stenosis.   Trace aortic regurgitation.   Aortic root is sclerotic and calcified.     Holter Monitor 48 Hour 05/04/2022-05/05/2022 (interpreted by Dr. Rodriguez):   Atrial fibrillation burden equals 100% and ventricular rate of  bpm (mean: 75 bpm).  Ventricular ectopy (VE) burden less than 1%.  1 patient event during AF with ventricular rate of 74 bpm.  No SVT, VT, high-grade AV block, or significant pauses.     Cardiac catheterization 04/15/2005:   Moderate coronary atherosclerosis in the RCA, LCx, and LAD arteries. Normal LV systolic function. LVEF 65%.       Status post successful TAWNYA guided cardioversion.  Moderate LV dysfunction seen on TAWNYA.  Mild to moderate RV dysfunction as well.  Full report to

## 2024-09-05 NOTE — PROGRESS NOTES
[facility] Quality Flow/Interdisciplinary Rounds Progress Note        Quality Flow Rounds held on September 5, 2024    Disciplines Attending:  Bedside Nurse, , , and Nursing Unit Leadership    Guero Chacko was admitted on 9/2/2024  2:06 AM    Anticipated Discharge Date:  Expected Discharge Date: 09/05/24    Disposition:    Sriram Score:  Sriram Scale Score: 22    Readmission Risk              Risk of Unplanned Readmission:  19           Discussed patient goal for the day, patient clinical progression, and barriers to discharge.  The following Goal(s) of the Day/Commitment(s) have been identified:   discharge planning, IV lasix, cardioversion 9/4, hem/onc (iron?), IV ferrelicit       Martín Saha RN  September 5, 2024

## 2024-09-06 VITALS
WEIGHT: 161.5 LBS | HEART RATE: 80 BPM | SYSTOLIC BLOOD PRESSURE: 156 MMHG | RESPIRATION RATE: 18 BRPM | BODY MASS INDEX: 21.41 KG/M2 | OXYGEN SATURATION: 98 % | TEMPERATURE: 97.5 F | DIASTOLIC BLOOD PRESSURE: 90 MMHG | HEIGHT: 73 IN

## 2024-09-06 LAB
ALBUMIN SERPL-MCNC: 4.1 G/DL (ref 3.5–5.2)
ALP SERPL-CCNC: 84 U/L (ref 40–129)
ALT SERPL-CCNC: 17 U/L (ref 0–40)
ANION GAP SERPL CALCULATED.3IONS-SCNC: 11 MMOL/L (ref 7–16)
ANION GAP SERPL CALCULATED.3IONS-SCNC: 12 MMOL/L (ref 7–16)
ANION GAP SERPL CALCULATED.3IONS-SCNC: 15 MMOL/L (ref 7–16)
AST SERPL-CCNC: 18 U/L (ref 0–39)
BILIRUB SERPL-MCNC: 0.5 MG/DL (ref 0–1.2)
BUN SERPL-MCNC: 20 MG/DL (ref 6–23)
BUN SERPL-MCNC: 21 MG/DL (ref 6–23)
CALCIUM SERPL-MCNC: 8.9 MG/DL (ref 8.6–10.2)
CALCIUM SERPL-MCNC: 9 MG/DL (ref 8.6–10.2)
CHLORIDE SERPL-SCNC: 102 MMOL/L (ref 98–107)
CHLORIDE SERPL-SCNC: 102 MMOL/L (ref 98–107)
CHLORIDE SERPL-SCNC: 104 MMOL/L (ref 98–107)
CO2 SERPL-SCNC: 16 MMOL/L (ref 22–29)
CO2 SERPL-SCNC: 25 MMOL/L (ref 22–29)
CO2 SERPL-SCNC: 25 MMOL/L (ref 22–29)
CREAT SERPL-MCNC: 0.9 MG/DL (ref 0.7–1.2)
CREAT SERPL-MCNC: 0.9 MG/DL (ref 0.7–1.2)
EKG ATRIAL RATE: 394 BPM
EKG ATRIAL RATE: 61 BPM
EKG P AXIS: 82 DEGREES
EKG P-R INTERVAL: 188 MS
EKG Q-T INTERVAL: 336 MS
EKG Q-T INTERVAL: 518 MS
EKG QRS DURATION: 102 MS
EKG QRS DURATION: 108 MS
EKG QTC CALCULATION (BAZETT): 467 MS
EKG QTC CALCULATION (BAZETT): 521 MS
EKG R AXIS: -53 DEGREES
EKG R AXIS: 60 DEGREES
EKG T AXIS: -48 DEGREES
EKG T AXIS: 64 DEGREES
EKG VENTRICULAR RATE: 116 BPM
EKG VENTRICULAR RATE: 61 BPM
ERYTHROCYTE [DISTWIDTH] IN BLOOD BY AUTOMATED COUNT: 13.3 % (ref 11.5–15)
GFR, ESTIMATED: 89 ML/MIN/1.73M2
GFR, ESTIMATED: >90 ML/MIN/1.73M2
GLUCOSE BLD-MCNC: 192 MG/DL (ref 74–99)
GLUCOSE BLD-MCNC: 226 MG/DL (ref 74–99)
GLUCOSE SERPL-MCNC: 178 MG/DL (ref 74–99)
GLUCOSE SERPL-MCNC: 238 MG/DL (ref 74–99)
HCT VFR BLD AUTO: 39.2 % (ref 37–54)
HGB BLD-MCNC: 12.6 G/DL (ref 12.5–16.5)
IGA SERPL-MCNC: 200 MG/DL (ref 70–400)
IGG SERPL-MCNC: 1375 MG/DL (ref 700–1600)
IGM SERPL-MCNC: 97 MG/DL (ref 40–230)
IMM RETICS NFR: 7.7 % (ref 2.3–13.4)
LDH SERPL-CCNC: 142 U/L (ref 135–225)
MAGNESIUM SERPL-MCNC: 1.9 MG/DL (ref 1.6–2.6)
MAGNESIUM SERPL-MCNC: 2 MG/DL (ref 1.6–2.6)
MCH RBC QN AUTO: 27 PG (ref 26–35)
MCHC RBC AUTO-ENTMCNC: 32.1 G/DL (ref 32–34.5)
MCV RBC AUTO: 84.1 FL (ref 80–99.9)
PLATELET # BLD AUTO: 124 K/UL (ref 130–450)
PMV BLD AUTO: 10.4 FL (ref 7–12)
POTASSIUM SERPL-SCNC: 4 MMOL/L (ref 3.5–5)
PROT SERPL-MCNC: 7.1 G/DL (ref 6.4–8.3)
RBC # BLD AUTO: 4.66 M/UL (ref 3.8–5.8)
RETIC HEMOGLOBIN: 31.6 PG (ref 28.2–36.6)
RETICS # AUTO: 0.07 M/UL
RETICS/RBC NFR AUTO: 1.6 % (ref 0.4–1.9)
SODIUM SERPL-SCNC: 135 MMOL/L (ref 132–146)
SODIUM SERPL-SCNC: 138 MMOL/L (ref 132–146)
SODIUM SERPL-SCNC: 139 MMOL/L (ref 132–146)
WBC OTHER # BLD: 5.9 K/UL (ref 4.5–11.5)

## 2024-09-06 PROCEDURE — 99233 SBSQ HOSP IP/OBS HIGH 50: CPT | Performed by: INTERNAL MEDICINE

## 2024-09-06 PROCEDURE — 80051 ELECTROLYTE PANEL: CPT

## 2024-09-06 PROCEDURE — 82962 GLUCOSE BLOOD TEST: CPT

## 2024-09-06 PROCEDURE — 6370000000 HC RX 637 (ALT 250 FOR IP): Performed by: INTERNAL MEDICINE

## 2024-09-06 PROCEDURE — 80053 COMPREHEN METABOLIC PANEL: CPT

## 2024-09-06 PROCEDURE — 85027 COMPLETE CBC AUTOMATED: CPT

## 2024-09-06 PROCEDURE — 6360000002 HC RX W HCPCS: Performed by: INTERNAL MEDICINE

## 2024-09-06 PROCEDURE — 6370000000 HC RX 637 (ALT 250 FOR IP): Performed by: FAMILY MEDICINE

## 2024-09-06 PROCEDURE — 84165 PROTEIN E-PHORESIS SERUM: CPT

## 2024-09-06 PROCEDURE — 85045 AUTOMATED RETICULOCYTE COUNT: CPT

## 2024-09-06 PROCEDURE — 83735 ASSAY OF MAGNESIUM: CPT

## 2024-09-06 PROCEDURE — 84155 ASSAY OF PROTEIN SERUM: CPT

## 2024-09-06 PROCEDURE — 80048 BASIC METABOLIC PNL TOTAL CA: CPT

## 2024-09-06 PROCEDURE — 2580000003 HC RX 258: Performed by: INTERNAL MEDICINE

## 2024-09-06 PROCEDURE — 2580000003 HC RX 258: Performed by: FAMILY MEDICINE

## 2024-09-06 PROCEDURE — 6360000002 HC RX W HCPCS: Performed by: FAMILY MEDICINE

## 2024-09-06 PROCEDURE — 83615 LACTATE (LD) (LDH) ENZYME: CPT

## 2024-09-06 PROCEDURE — 93010 ELECTROCARDIOGRAM REPORT: CPT | Performed by: INTERNAL MEDICINE

## 2024-09-06 PROCEDURE — 82784 ASSAY IGA/IGD/IGG/IGM EACH: CPT

## 2024-09-06 RX ORDER — INSULIN LISPRO 100 [IU]/ML
2 INJECTION, SOLUTION INTRAVENOUS; SUBCUTANEOUS ONCE
Status: COMPLETED | OUTPATIENT
Start: 2024-09-06 | End: 2024-09-06

## 2024-09-06 RX ORDER — TORSEMIDE 10 MG/1
10 TABLET ORAL DAILY
Qty: 60 TABLET | Refills: 1 | Status: SHIPPED | OUTPATIENT
Start: 2024-09-06

## 2024-09-06 RX ADMIN — Medication 1 TABLET: at 09:37

## 2024-09-06 RX ADMIN — POTASSIUM CHLORIDE 20 MEQ: 1500 TABLET, EXTENDED RELEASE ORAL at 09:37

## 2024-09-06 RX ADMIN — INSULIN LISPRO 2 UNITS: 100 INJECTION, SOLUTION INTRAVENOUS; SUBCUTANEOUS at 11:40

## 2024-09-06 RX ADMIN — INSULIN LISPRO 2 UNITS: 100 INJECTION, SOLUTION INTRAVENOUS; SUBCUTANEOUS at 09:38

## 2024-09-06 RX ADMIN — SACUBITRIL AND VALSARTAN 1 TABLET: 49; 51 TABLET, FILM COATED ORAL at 09:37

## 2024-09-06 RX ADMIN — ASPIRIN 81 MG: 81 TABLET, COATED ORAL at 09:37

## 2024-09-06 RX ADMIN — FINASTERIDE 5 MG: 5 TABLET, FILM COATED ORAL at 09:37

## 2024-09-06 RX ADMIN — SODIUM CHLORIDE 125 MG: 900 INJECTION INTRAVENOUS at 11:38

## 2024-09-06 RX ADMIN — METOPROLOL SUCCINATE 150 MG: 100 TABLET, EXTENDED RELEASE ORAL at 09:36

## 2024-09-06 RX ADMIN — FUROSEMIDE 20 MG: 10 INJECTION, SOLUTION INTRAMUSCULAR; INTRAVENOUS at 09:38

## 2024-09-06 RX ADMIN — ENOXAPARIN SODIUM 40 MG: 100 INJECTION SUBCUTANEOUS at 09:39

## 2024-09-06 RX ADMIN — SODIUM CHLORIDE, PRESERVATIVE FREE 10 ML: 5 INJECTION INTRAVENOUS at 09:40

## 2024-09-06 NOTE — PROGRESS NOTES
extremities x 4, no lower extremity edema  Neurologic: No focal motor deficits apparent, normal mood and affect  Peripheral Pulses: Intact posterior tibial pulses bilaterally    Intake/Output:    Intake/Output Summary (Last 24 hours) at 9/6/2024 0952  Last data filed at 9/5/2024 2248  Gross per 24 hour   Intake 195.53 ml   Output 900 ml   Net -704.47 ml     No intake/output data recorded.    Laboratory Tests:  Recent Labs     09/04/24  0430 09/04/24  1442 09/05/24  0412 09/06/24  0415     --  138 135   K 3.1* 3.9 3.6 4.0     --  104 104   CO2 24  --  23 16*   BUN 16  --  16 21   CREATININE 1.0  --  0.9 0.9   GLUCOSE 127*  --  149* 178*   CALCIUM 8.8  --  8.9 8.9     Lab Results   Component Value Date/Time    MG 1.9 09/06/2024 04:15 AM     No results for input(s): \"ALKPHOS\", \"ALT\", \"AST\", \"BILITOT\", \"BILIDIR\", \"LABALBU\" in the last 72 hours.    Invalid input(s): \"PROT\"    Recent Labs     09/06/24 0415   WBC 5.9   RBC 4.66   HGB 12.6   HCT 39.2   MCV 84.1   MCH 27.0   MCHC 32.1   RDW 13.3   *   MPV 10.4     Lab Results   Component Value Date    CKTOTAL 37 12/27/2010    CKMB <0.2 12/27/2010    TROPONINI <0.01 02/11/2021    TROPONINI <0.01 02/06/2021    TROPONINI <0.01 07/08/2020     Lab Results   Component Value Date    INR 1.3 10/03/2022    INR 1.2 09/30/2022    INR 1.4 02/11/2021    PROTIME 13.6 (H) 10/03/2022    PROTIME 13.4 (H) 09/30/2022    PROTIME 16.3 (H) 02/11/2021     No results found for: \"TSHFT4\", \"TSH\"  Lab Results   Component Value Date    LABA1C 5.8 (H) 10/01/2022     No results found for: \"EAG\"  Lab Results   Component Value Date    CHOL 128 10/01/2022     Lab Results   Component Value Date    TRIG 166 (H) 10/01/2022     Lab Results   Component Value Date    HDL 56 08/22/2024    HDL 46 10/01/2022     No components found for: \"LDLCALC\", \"LDLCHOLESTEROL\"  Lab Results   Component Value Date    VLDL 17 08/22/2024    VLDL 33 10/01/2022     No results found for: \"CHOLHDLRATIO\"  Recent Labs  Hour 05/04/2022-05/05/2022 (interpreted by Dr. Rodriguez):   Atrial fibrillation burden equals 100% and ventricular rate of  bpm (mean: 75 bpm).  Ventricular ectopy (VE) burden less than 1%.  1 patient event during AF with ventricular rate of 74 bpm.  No SVT, VT, high-grade AV block, or significant pauses.     Cardiac catheterization 04/15/2005:   Moderate coronary atherosclerosis in the RCA, LCx, and LAD arteries. Normal LV systolic function. LVEF 65%.       Status post successful TAWNYA guided cardioversion.  Moderate LV dysfunction seen on TAWNYA.  Mild to moderate RV dysfunction as well.  Full report to follow.    LTD TTE 9/5/24:    Limited study to assess biventricular function.    Left Ventricle: Mildly reduced left ventricular systolic function with a visually estimated EF of 45 - 50%.    Right Ventricle: Low normal systolic function.    Mitral Valve: Mild annular calcification. Mild to moderate regurgitation.    Left Atrium: Left atrium is mildly dilated.    Pericardium: Trivial pericardial effusion present. No indication of cardiac tamponade.    Image quality is adequate.  ----------------------------------------------------------------------------------------------------------------------------------------------------------------  IMPRESSION:  Acute decompensated heart failure-heart failure preserved ejection fraction  Bilateral pleural effusion  History of pericardial effusion  Peripheral arterial disease  Permanent atrial fibrillation status post watchman implantation  Ulcerative colitis  History of TIA  Hypertension, hyperlipidemia    RECOMMENDATIONS:    Appears near euvolemic on examination.  Start Demadex 20 mg daily.  Discontinue IV Lasix  Home Cardizem was discontinued.  Continue with Toprol-XL at current doses.  Echocardiogram shows mild LV systolic dysfunction, low normal RV function.  Pressures now doing better.  Started on Entresto yesterday.  Will start Jardiance as well.  Consider Aldactone

## 2024-09-06 NOTE — PROGRESS NOTES
Feels good- wants to go home  Vss  lungs clear  reg rhythm  abd- soft and non-tender  ext- no edema  Stable- lab noted  Recheck lytes- follow labs- continue present care- home when ok with other 's

## 2024-09-06 NOTE — PROGRESS NOTES
Discharge instructions explained to pt and wife and copy given.  Both verbalize understanding and are in agreement with dc plan to home.  Home meds returned to pt prior to discharge, and meds delivered to bedside via meds to beds.  Pt to be transported home via private car with wife.  Telemetry sanitized and pt propelled off unit in w/c via staff assist without incident.

## 2024-09-06 NOTE — PROGRESS NOTES
[facility] Quality Flow/Interdisciplinary Rounds Progress Note        Quality Flow Rounds held on September 6, 2024    Disciplines Attending:  Bedside Nurse, , , and Nursing Unit Leadership    Guero Chacko was admitted on 9/2/2024  2:06 AM    Anticipated Discharge Date:  Expected Discharge Date: 09/05/24    Disposition:    Sriram Score:  Sriram Scale Score: 22    Readmission Risk              Risk of Unplanned Readmission:  18           Discussed patient goal for the day, patient clinical progression, and barriers to discharge.  The following Goal(s) of the Day/Commitment(s) have been identified:   iv lasix bid, await repeat labs,      Izabella Figueroa RN  September 6, 2024

## 2024-09-06 NOTE — PROGRESS NOTES
Offered pouch change before discharge. Patient declined States they can manage at home.  Supplies in room  Spring Smith, CNS, Wound Care

## 2024-09-06 NOTE — PROGRESS NOTES
Subjective:    The patient is awake and alert, sitting up in bed with IV Ferrlecit infusing.  No problems overnight.  Denies chest pain, angina, dyspnea or abdominal discomfort. No nausea or vomiting. Tolerating diet. He is asking if he can go home. I told him I will have the clinic reach out to him to schedule a follow up and to complete iron infusions.    Objective:    BP (!) 147/72   Pulse 69   Temp 98.2 °F (36.8 °C) (Oral)   Resp 16   Ht 1.854 m (6' 1\")   Wt 73.3 kg (161 lb 8 oz)   SpO2 98%   BMI 21.31 kg/m²     General: Alert and oriented, no acute distress  HEENT: No thrush or mucositis, EOMI, PERRLA  Heart:  RRR, no murmurs, gallops, or rubs.  Lungs:  CTA bilaterally, no wheeze, rales or rhonchi  Abd: BS present, nontender, nondistended, no masses  Extrem:  No clubbing, cyanosis, or edema  Lymphatics: No palpable adenopathy in cervical and supraclavicular regions  Skin: Intact, no petechia or purpura    CBC with Differential:    Lab Results   Component Value Date/Time    WBC 5.9 09/06/2024 04:15 AM    RBC 4.66 09/06/2024 04:15 AM    HGB 12.6 09/06/2024 04:15 AM    HCT 39.2 09/06/2024 04:15 AM     09/06/2024 04:15 AM    MCV 84.1 09/06/2024 04:15 AM    MCH 27.0 09/06/2024 04:15 AM    MCHC 32.1 09/06/2024 04:15 AM    RDW 13.3 09/06/2024 04:15 AM    NRBC 0.0 02/07/2021 06:00 AM    LYMPHOPCT 10 09/03/2024 04:45 AM    MONOPCT 5 09/03/2024 04:45 AM    EOSPCT 1 09/03/2024 04:45 AM    BASOPCT 1 09/03/2024 04:45 AM    MONOSABS 0.33 09/03/2024 04:45 AM    LYMPHSABS 0.62 09/03/2024 04:45 AM    EOSABS 0.07 09/03/2024 04:45 AM    BASOSABS 0.07 09/03/2024 04:45 AM     CMP:    Lab Results   Component Value Date/Time     09/06/2024 04:15 AM    K 4.0 09/06/2024 04:15 AM    K 4.0 09/30/2022 10:30 AM     09/06/2024 04:15 AM    CO2 16 09/06/2024 04:15 AM    BUN 21 09/06/2024 04:15 AM    CREATININE 0.9 09/06/2024 04:15 AM    GFRAA >60 10/04/2022 04:45 AM    LABGLOM >90 09/06/2024 04:15 AM    GLUCOSE 178  XL) extended release tablet 150 mg, 150 mg, Oral, Daily, Cameron Torres MD, 150 mg at 09/05/24 0909    hydrocortisone (CORTENEMA) enema 100 mg, 100 mg, Rectal, Nightly, Cameron Torres MD, 100 mg at 09/05/24 1928    XR CHEST (2 VW)   Final Result   No acute cardiopulmonary disease.         CTA PULMONARY W CONTRAST   Final Result   CHEST:      No acute pulmonary embolism identified.      Trace pericardial effusion.  Moderate bilateral pleural effusions. Bronchial   wall thickening, correlate for bronchitis.      ABDOMEN/PELVIS:      Right lower quadrant ileostomy. No bowel obstruction.  Mildly thickened   appearance of small bowel loops in the left abdomen and cannot exclude mild   enteritis.  Mildly thickened appearance of sigmoid colon and cannot exclude   colitis.      Mild urinary bladder wall thickening, correlate with urinalysis to evaluate   for cystitis.  Sizable left lateral urinary bladder diverticulum.      Slight irregularity of the right anterior aspect of the urinary bladder which   could reflect a small diverticulum, other possibility would be a bladder   lesion.      Skin thickening about the ileostomy.  Correlate for any concern of cellulitis.      Additional observations as above.         CT ABDOMEN PELVIS W IV CONTRAST Additional Contrast? None   Final Result   CHEST:      No acute pulmonary embolism identified.      Trace pericardial effusion.  Moderate bilateral pleural effusions. Bronchial   wall thickening, correlate for bronchitis.      ABDOMEN/PELVIS:      Right lower quadrant ileostomy. No bowel obstruction.  Mildly thickened   appearance of small bowel loops in the left abdomen and cannot exclude mild   enteritis.  Mildly thickened appearance of sigmoid colon and cannot exclude   colitis.      Mild urinary bladder wall thickening, correlate with urinalysis to evaluate   for cystitis.  Sizable left lateral urinary bladder diverticulum.      Slight irregularity of the right anterior

## 2024-09-06 NOTE — PROGRESS NOTES
CLINICAL PHARMACY NOTE: MEDS TO BEDS    Total # of Prescriptions Filled: 1   The following medications were delivered to the patient:    Entresto 49-52 mg    Additional Documentation:doctor cancelled eliquis. We retrieved script from patient

## 2024-09-06 NOTE — PROGRESS NOTES
Pulmonary Progress Note    Admit Date: 2024  Hospital day                               PCP: Cameron Torres MD    Chief Complaint (s):  Patient Active Problem List   Diagnosis    Coronary atherosclerosis of native coronary artery    Hypertension    Hyperlipidemia    Diabetes mellitus (HCC)    Permanent atrial fibrillation (HCC)    History of echocardiogram    Adrenal adenoma    Testicular hypofunction    Ulcerative colitis (HCC)    History of tobacco use    H/O carotid endarterectomy    Iron deficiency anemia, unspecified    Pleural effusion, bilateral    Iron deficiency anemia    Shortness of breath    Acute on chronic diastolic congestive heart failure (HCC)    Hypokalemia    Acute systolic congestive heart failure (HCC)       Subjective:  Breathing easy, anxious for discharge.      Vitals:  VITALS:  BP (!) 156/90   Pulse 80   Temp 97.5 °F (36.4 °C) (Oral)   Resp 18   Ht 1.854 m (6' 1\")   Wt 73.3 kg (161 lb 8 oz)   SpO2 98%   BMI 21.31 kg/m²     24HR INTAKE/OUTPUT:      Intake/Output Summary (Last 24 hours) at 2024 1534  Last data filed at 2024 2248  Gross per 24 hour   Intake --   Output 900 ml   Net -900 ml       24HR PULSE OXIMETRY RANGE:    SpO2  Av.5 %  Min: 97 %  Max: 98 %    Medications:  IV:   sodium chloride      dextrose         Scheduled Meds:   sacubitril-valsartan  1 tablet Oral BID    potassium chloride  20 mEq Oral BID WC    furosemide  20 mg IntraVENous BID    sodium chloride flush  5-40 mL IntraVENous 2 times per day    enoxaparin  40 mg SubCUTAneous Daily    aspirin  81 mg Oral Daily    finasteride  5 mg Oral Daily    pravastatin  20 mg Oral Nightly    multivitamin  1 tablet Oral Daily    insulin lispro  0-8 Units SubCUTAneous TID WC    insulin lispro  0-4 Units SubCUTAneous Nightly    doxazosin  4 mg Oral QHS    insulin glargine  5 Units SubCUTAneous Nightly    metoprolol succinate  150 mg Oral Daily    hydrocortisone  100 mg Rectal Nightly       Diet:  minutes.    Please note that voice recognition technology was used in the preparation of this note and make therefore it may contain inadvertent transcription errors.  If the patient is a COVID 19 isolation patient, the above physical exam reflects that of the examining physician for the day.      Hung Oakley MD,  M.D., F.C.C.P.  [facility]    [facility], [address] [place] [address], [address], [place] [address]  Office visits:  [address] [place] [address]

## 2024-09-07 ENCOUNTER — APPOINTMENT (OUTPATIENT)
Dept: CT IMAGING | Age: 69
End: 2024-09-07
Payer: MEDICARE

## 2024-09-07 ENCOUNTER — HOSPITAL ENCOUNTER (OUTPATIENT)
Age: 69
Setting detail: OBSERVATION
Discharge: HOME | End: 2024-09-08
Attending: EMERGENCY MEDICINE | Admitting: STUDENT IN AN ORGANIZED HEALTH CARE EDUCATION/TRAINING PROGRAM
Payer: MEDICARE

## 2024-09-07 VITALS
BODY MASS INDEX: 9.72 KG/M2 | WEIGHT: 73.3 LBS | TEMPERATURE: 97.8 F | HEIGHT: 73 IN | DIASTOLIC BLOOD PRESSURE: 80 MMHG | HEART RATE: 72 BPM | SYSTOLIC BLOOD PRESSURE: 137 MMHG | OXYGEN SATURATION: 99 % | RESPIRATION RATE: 16 BRPM

## 2024-09-07 DIAGNOSIS — K40.90 RIGHT INGUINAL HERNIA: Primary | ICD-10-CM

## 2024-09-07 DIAGNOSIS — Z93.9 HISTORY OF CREATION OF OSTOMY (HCC): ICD-10-CM

## 2024-09-07 LAB
ALBUMIN SERPL-MCNC: 4.2 G/DL (ref 3.5–5.2)
ALP SERPL-CCNC: 88 U/L (ref 40–129)
ALT SERPL-CCNC: 15 U/L (ref 0–40)
ANION GAP SERPL CALCULATED.3IONS-SCNC: 10 MMOL/L (ref 7–16)
AST SERPL-CCNC: 17 U/L (ref 0–39)
BASOPHILS # BLD: 0.05 K/UL (ref 0–0.2)
BASOPHILS NFR BLD: 1 % (ref 0–2)
BILIRUB SERPL-MCNC: 0.5 MG/DL (ref 0–1.2)
BILIRUB UR QL STRIP: NEGATIVE
BUN SERPL-MCNC: 25 MG/DL (ref 6–23)
CALCIUM SERPL-MCNC: 9.2 MG/DL (ref 8.6–10.2)
CHLORIDE SERPL-SCNC: 101 MMOL/L (ref 98–107)
CLARITY UR: CLEAR
CO2 SERPL-SCNC: 24 MMOL/L (ref 22–29)
COLOR UR: YELLOW
CREAT SERPL-MCNC: 1 MG/DL (ref 0.7–1.2)
EOSINOPHIL # BLD: 0.04 K/UL (ref 0.05–0.5)
EOSINOPHILS RELATIVE PERCENT: 1 % (ref 0–6)
ERYTHROCYTE [DISTWIDTH] IN BLOOD BY AUTOMATED COUNT: 13.5 % (ref 11.5–15)
GFR, ESTIMATED: 81 ML/MIN/1.73M2
GLUCOSE SERPL-MCNC: 186 MG/DL (ref 74–99)
GLUCOSE UR STRIP-MCNC: NEGATIVE MG/DL
HCT VFR BLD AUTO: 40.7 % (ref 37–54)
HGB BLD-MCNC: 13.2 G/DL (ref 12.5–16.5)
HGB UR QL STRIP.AUTO: ABNORMAL
IMM GRANULOCYTES # BLD AUTO: <0.03 K/UL (ref 0–0.58)
IMM GRANULOCYTES NFR BLD: 0 % (ref 0–5)
KETONES UR STRIP-MCNC: NEGATIVE MG/DL
LACTATE BLDV-SCNC: 1.4 MMOL/L (ref 0.5–2.2)
LEUKOCYTE ESTERASE UR QL STRIP: ABNORMAL
LIPASE SERPL-CCNC: 13 U/L (ref 13–60)
LYMPHOCYTES NFR BLD: 0.99 K/UL (ref 1.5–4)
LYMPHOCYTES RELATIVE PERCENT: 12 % (ref 20–42)
MCH RBC QN AUTO: 27.1 PG (ref 26–35)
MCHC RBC AUTO-ENTMCNC: 32.4 G/DL (ref 32–34.5)
MCV RBC AUTO: 83.6 FL (ref 80–99.9)
MONOCYTES NFR BLD: 0.62 K/UL (ref 0.1–0.95)
MONOCYTES NFR BLD: 8 % (ref 2–12)
NEUTROPHILS NFR BLD: 79 % (ref 43–80)
NEUTS SEG NFR BLD: 6.35 K/UL (ref 1.8–7.3)
NITRITE UR QL STRIP: NEGATIVE
PH UR STRIP: 6 [PH] (ref 5–9)
PLATELET # BLD AUTO: 192 K/UL (ref 130–450)
PMV BLD AUTO: 9.5 FL (ref 7–12)
POTASSIUM SERPL-SCNC: 3.8 MMOL/L (ref 3.5–5)
PROT SERPL-MCNC: 7.2 G/DL (ref 6.4–8.3)
PROT UR STRIP-MCNC: NEGATIVE MG/DL
RBC # BLD AUTO: 4.87 M/UL (ref 3.8–5.8)
RBC #/AREA URNS HPF: ABNORMAL /HPF
SODIUM SERPL-SCNC: 135 MMOL/L (ref 132–146)
SP GR UR STRIP: 1.01 (ref 1–1.03)
UROBILINOGEN UR STRIP-ACNC: 0.2 EU/DL (ref 0–1)
WBC #/AREA URNS HPF: ABNORMAL /HPF
WBC OTHER # BLD: 8.1 K/UL (ref 4.5–11.5)

## 2024-09-07 PROCEDURE — 99285 EMERGENCY DEPT VISIT HI MDM: CPT

## 2024-09-07 PROCEDURE — 96372 THER/PROPH/DIAG INJ SC/IM: CPT

## 2024-09-07 PROCEDURE — 6370000000 HC RX 637 (ALT 250 FOR IP)

## 2024-09-07 PROCEDURE — 96375 TX/PRO/DX INJ NEW DRUG ADDON: CPT

## 2024-09-07 PROCEDURE — 81001 URINALYSIS AUTO W/SCOPE: CPT

## 2024-09-07 PROCEDURE — 83605 ASSAY OF LACTIC ACID: CPT

## 2024-09-07 PROCEDURE — 85025 COMPLETE CBC W/AUTO DIFF WBC: CPT

## 2024-09-07 PROCEDURE — G0378 HOSPITAL OBSERVATION PER HR: HCPCS

## 2024-09-07 PROCEDURE — 83690 ASSAY OF LIPASE: CPT

## 2024-09-07 PROCEDURE — 74177 CT ABD & PELVIS W/CONTRAST: CPT

## 2024-09-07 PROCEDURE — 80053 COMPREHEN METABOLIC PANEL: CPT

## 2024-09-07 PROCEDURE — 96374 THER/PROPH/DIAG INJ IV PUSH: CPT

## 2024-09-07 PROCEDURE — 6360000004 HC RX CONTRAST MEDICATION: Performed by: RADIOLOGY

## 2024-09-07 PROCEDURE — 2580000003 HC RX 258

## 2024-09-07 PROCEDURE — 6360000002 HC RX W HCPCS

## 2024-09-07 PROCEDURE — 6360000002 HC RX W HCPCS: Performed by: EMERGENCY MEDICINE

## 2024-09-07 RX ORDER — METOPROLOL SUCCINATE 50 MG/1
150 TABLET, EXTENDED RELEASE ORAL EVERY MORNING
Status: DISCONTINUED | OUTPATIENT
Start: 2024-09-08 | End: 2024-09-08 | Stop reason: HOSPADM

## 2024-09-07 RX ORDER — SODIUM CHLORIDE 0.9 % (FLUSH) 0.9 %
5-40 SYRINGE (ML) INJECTION EVERY 12 HOURS SCHEDULED
Status: DISCONTINUED | OUTPATIENT
Start: 2024-09-07 | End: 2024-09-08 | Stop reason: HOSPADM

## 2024-09-07 RX ORDER — ACETAMINOPHEN 325 MG/1
650 TABLET ORAL EVERY 6 HOURS PRN
Status: DISCONTINUED | OUTPATIENT
Start: 2024-09-07 | End: 2024-09-08 | Stop reason: HOSPADM

## 2024-09-07 RX ORDER — 0.9 % SODIUM CHLORIDE 0.9 %
1000 INTRAVENOUS SOLUTION INTRAVENOUS ONCE
Status: COMPLETED | OUTPATIENT
Start: 2024-09-07 | End: 2024-09-07

## 2024-09-07 RX ORDER — INSULIN LISPRO 100 [IU]/ML
0-4 INJECTION, SOLUTION INTRAVENOUS; SUBCUTANEOUS NIGHTLY
Status: DISCONTINUED | OUTPATIENT
Start: 2024-09-07 | End: 2024-09-08 | Stop reason: HOSPADM

## 2024-09-07 RX ORDER — PROCHLORPERAZINE EDISYLATE 5 MG/ML
10 INJECTION INTRAMUSCULAR; INTRAVENOUS EVERY 6 HOURS PRN
Status: DISCONTINUED | OUTPATIENT
Start: 2024-09-07 | End: 2024-09-08 | Stop reason: HOSPADM

## 2024-09-07 RX ORDER — INSULIN LISPRO 100 [IU]/ML
0-4 INJECTION, SOLUTION INTRAVENOUS; SUBCUTANEOUS
Status: DISCONTINUED | OUTPATIENT
Start: 2024-09-08 | End: 2024-09-08 | Stop reason: HOSPADM

## 2024-09-07 RX ORDER — MAGNESIUM SULFATE IN WATER 40 MG/ML
2000 INJECTION, SOLUTION INTRAVENOUS PRN
Status: DISCONTINUED | OUTPATIENT
Start: 2024-09-07 | End: 2024-09-08 | Stop reason: HOSPADM

## 2024-09-07 RX ORDER — POTASSIUM CHLORIDE 1500 MG/1
40 TABLET, EXTENDED RELEASE ORAL DAILY
Status: DISCONTINUED | OUTPATIENT
Start: 2024-09-08 | End: 2024-09-08 | Stop reason: HOSPADM

## 2024-09-07 RX ORDER — IOPAMIDOL 755 MG/ML
75 INJECTION, SOLUTION INTRAVASCULAR
Status: COMPLETED | OUTPATIENT
Start: 2024-09-07 | End: 2024-09-07

## 2024-09-07 RX ORDER — ONDANSETRON 4 MG/1
4 TABLET, ORALLY DISINTEGRATING ORAL EVERY 8 HOURS PRN
Status: DISCONTINUED | OUTPATIENT
Start: 2024-09-07 | End: 2024-09-07 | Stop reason: CLARIF

## 2024-09-07 RX ORDER — ACETAMINOPHEN 650 MG/1
650 SUPPOSITORY RECTAL EVERY 6 HOURS PRN
Status: DISCONTINUED | OUTPATIENT
Start: 2024-09-07 | End: 2024-09-08 | Stop reason: HOSPADM

## 2024-09-07 RX ORDER — FENTANYL CITRATE 50 UG/ML
50 INJECTION, SOLUTION INTRAMUSCULAR; INTRAVENOUS ONCE
Status: COMPLETED | OUTPATIENT
Start: 2024-09-07 | End: 2024-09-07

## 2024-09-07 RX ORDER — ACETAMINOPHEN 500 MG
1000 TABLET ORAL
Status: COMPLETED | OUTPATIENT
Start: 2024-09-07 | End: 2024-09-07

## 2024-09-07 RX ORDER — POLYETHYLENE GLYCOL 3350 17 G/17G
17 POWDER, FOR SOLUTION ORAL DAILY PRN
Status: DISCONTINUED | OUTPATIENT
Start: 2024-09-07 | End: 2024-09-08 | Stop reason: HOSPADM

## 2024-09-07 RX ORDER — FINASTERIDE 5 MG/1
5 TABLET, FILM COATED ORAL DAILY
Status: DISCONTINUED | OUTPATIENT
Start: 2024-09-08 | End: 2024-09-08 | Stop reason: HOSPADM

## 2024-09-07 RX ORDER — SODIUM CHLORIDE 9 MG/ML
INJECTION, SOLUTION INTRAVENOUS PRN
Status: DISCONTINUED | OUTPATIENT
Start: 2024-09-07 | End: 2024-09-08 | Stop reason: HOSPADM

## 2024-09-07 RX ORDER — PROCHLORPERAZINE MALEATE 10 MG
10 TABLET ORAL EVERY 8 HOURS PRN
Status: DISCONTINUED | OUTPATIENT
Start: 2024-09-07 | End: 2024-09-08 | Stop reason: HOSPADM

## 2024-09-07 RX ORDER — ONDANSETRON 2 MG/ML
4 INJECTION INTRAMUSCULAR; INTRAVENOUS ONCE
Status: COMPLETED | OUTPATIENT
Start: 2024-09-07 | End: 2024-09-07

## 2024-09-07 RX ORDER — PRAVASTATIN SODIUM 20 MG
20 TABLET ORAL NIGHTLY
Status: DISCONTINUED | OUTPATIENT
Start: 2024-09-07 | End: 2024-09-08 | Stop reason: HOSPADM

## 2024-09-07 RX ORDER — SODIUM CHLORIDE 0.9 % (FLUSH) 0.9 %
5-40 SYRINGE (ML) INJECTION PRN
Status: DISCONTINUED | OUTPATIENT
Start: 2024-09-07 | End: 2024-09-08 | Stop reason: HOSPADM

## 2024-09-07 RX ORDER — DOXAZOSIN 1 MG/1
1 TABLET ORAL DAILY
Status: DISCONTINUED | OUTPATIENT
Start: 2024-09-08 | End: 2024-09-08 | Stop reason: HOSPADM

## 2024-09-07 RX ORDER — ENOXAPARIN SODIUM 100 MG/ML
30 INJECTION SUBCUTANEOUS DAILY
Status: DISCONTINUED | OUTPATIENT
Start: 2024-09-08 | End: 2024-09-08 | Stop reason: HOSPADM

## 2024-09-07 RX ORDER — POTASSIUM CHLORIDE 7.45 MG/ML
10 INJECTION INTRAVENOUS PRN
Status: DISCONTINUED | OUTPATIENT
Start: 2024-09-07 | End: 2024-09-08 | Stop reason: HOSPADM

## 2024-09-07 RX ORDER — POTASSIUM CHLORIDE 1500 MG/1
40 TABLET, EXTENDED RELEASE ORAL PRN
Status: DISCONTINUED | OUTPATIENT
Start: 2024-09-07 | End: 2024-09-08 | Stop reason: HOSPADM

## 2024-09-07 RX ORDER — INSULIN GLARGINE 100 [IU]/ML
5 INJECTION, SOLUTION SUBCUTANEOUS NIGHTLY
Status: DISCONTINUED | OUTPATIENT
Start: 2024-09-07 | End: 2024-09-08 | Stop reason: HOSPADM

## 2024-09-07 RX ORDER — ONDANSETRON 2 MG/ML
4 INJECTION INTRAMUSCULAR; INTRAVENOUS EVERY 6 HOURS PRN
Status: DISCONTINUED | OUTPATIENT
Start: 2024-09-07 | End: 2024-09-07 | Stop reason: CLARIF

## 2024-09-07 RX ORDER — ASPIRIN 81 MG/1
81 TABLET ORAL DAILY
Status: DISCONTINUED | OUTPATIENT
Start: 2024-09-08 | End: 2024-09-08 | Stop reason: HOSPADM

## 2024-09-07 RX ORDER — TORSEMIDE 20 MG/1
10 TABLET ORAL DAILY
Status: DISCONTINUED | OUTPATIENT
Start: 2024-09-08 | End: 2024-09-08 | Stop reason: HOSPADM

## 2024-09-07 RX ADMIN — FENTANYL CITRATE 50 MCG: 50 INJECTION INTRAMUSCULAR; INTRAVENOUS at 19:05

## 2024-09-07 RX ADMIN — SODIUM CHLORIDE 1000 ML: 9 INJECTION, SOLUTION INTRAVENOUS at 16:21

## 2024-09-07 RX ADMIN — ACETAMINOPHEN 1000 MG: 500 TABLET ORAL at 16:19

## 2024-09-07 RX ADMIN — IOPAMIDOL 75 ML: 755 INJECTION, SOLUTION INTRAVENOUS at 17:44

## 2024-09-07 RX ADMIN — ONDANSETRON 4 MG: 2 INJECTION INTRAMUSCULAR; INTRAVENOUS at 16:19

## 2024-09-07 ASSESSMENT — PAIN - FUNCTIONAL ASSESSMENT
PAIN_FUNCTIONAL_ASSESSMENT: 0-10
PAIN_FUNCTIONAL_ASSESSMENT: NONE - DENIES PAIN
PAIN_FUNCTIONAL_ASSESSMENT: NONE - DENIES PAIN
PAIN_FUNCTIONAL_ASSESSMENT: PREVENTS OR INTERFERES SOME ACTIVE ACTIVITIES AND ADLS
PAIN_FUNCTIONAL_ASSESSMENT: ACTIVITIES ARE NOT PREVENTED

## 2024-09-07 ASSESSMENT — PAIN DESCRIPTION - LOCATION
LOCATION: SCROTUM;GROIN
LOCATION: ABDOMEN
LOCATION: ABDOMEN;FLANK

## 2024-09-07 ASSESSMENT — PAIN SCALES - GENERAL
PAINLEVEL_OUTOF10: 5
PAINLEVEL_OUTOF10: 4

## 2024-09-07 ASSESSMENT — PAIN DESCRIPTION - DESCRIPTORS
DESCRIPTORS: TENDER
DESCRIPTORS: ACHING

## 2024-09-07 ASSESSMENT — PAIN DESCRIPTION - ORIENTATION
ORIENTATION: RIGHT;LOWER
ORIENTATION: RIGHT
ORIENTATION: RIGHT

## 2024-09-07 NOTE — DISCHARGE SUMMARY
[place] [address]                            DISCHARGE SUMMARY      PATIENT NAME: IVANNA MC                : 1955  MED REC NO: [id number]                        ROOM: [id number]  ACCOUNT NO: [id number]                       ADMIT DATE: 2024  PROVIDER: Cameron Torres MD      PRINCIPAL DIAGNOSIS:  Congestive heart failure, acute heart failure with reduced ejection fraction.    SECONDARY DIAGNOSES:  Include bilateral pleural effusions; chronic atrial fibrillation; atrial fibrillation with rapid ventricular response; anemia, iron deficiency; ulcerative colitis; hypertension; hyperlipidemia; diabetes mellitus; coronary artery disease; chronic heart failure with preserved ejection fraction; hypokalemia.    HISTORY:  The patient is a white male who got progressively short of breath, presented to emergency room, found to be in congestive heart failure, subsequently admitted for further evaluation and treatment.    PHYSICAL EXAMINATION:  Basically unremarkable at the time of my exam except for irregular heart rhythm.    SIGNIFICANT X-RAYS AND LABS:  Please refer to the chart.    HOSPITAL COURSE:  The patient was admitted appropriate blood work was obtained.  Medicines were reviewed and were appropriate.  IV diuretics were utilized.  Cardiology consultation was obtained.  Medication adjustment was done.  There was a 2D echocardiogram done, which showed reduced ejection fraction, I believe approximately 30% to 35%.  There was also a pulmonary consultation.  Pleural effusions were observed.  No need for thoracentesis and they gradually resolved with diuretic therapy.  Because of the iron deficiency anemia, there was a Hematology consult and IV iron was utilized.  The patient did not take p.o. iron because of his colostomy bag.  Potassium supplements were utilized as needed.  Blood sugars were followed and medical

## 2024-09-08 PROCEDURE — G0378 HOSPITAL OBSERVATION PER HR: HCPCS

## 2024-09-09 LAB
ALBUMIN SERPL-MCNC: 3.3 G/DL (ref 3.5–4.7)
ALPHA1 GLOB SERPL ELPH-MCNC: 0.3 G/DL (ref 0.2–0.4)
ALPHA2 GLOB SERPL ELPH-MCNC: 0.7 G/DL (ref 0.5–1)
B-GLOBULIN SERPL ELPH-MCNC: 0.9 G/DL (ref 0.8–1.3)
GAMMA GLOB SERPL ELPH-MCNC: 1.6 G/DL (ref 0.7–1.6)
PATHOLOGIST: ABNORMAL
PROT PATTERN SERPL ELPH-IMP: ABNORMAL
PROT SERPL-MCNC: 6.8 G/DL (ref 6.4–8.3)

## 2024-09-11 PROBLEM — I50.21 ACUTE SYSTOLIC CONGESTIVE HEART FAILURE (HCC): Status: RESOLVED | Noted: 2024-09-05 | Resolved: 2024-09-11

## 2024-09-11 PROBLEM — K40.90 RIGHT INGUINAL HERNIA: Status: RESOLVED | Noted: 2024-09-07 | Resolved: 2024-09-11

## 2024-09-11 PROBLEM — Z92.89 HISTORY OF ECHOCARDIOGRAM: Status: RESOLVED | Noted: 2020-10-01 | Resolved: 2024-09-11

## 2024-09-11 PROBLEM — E87.6 HYPOKALEMIA: Status: RESOLVED | Noted: 2024-09-04 | Resolved: 2024-09-11

## 2024-09-11 PROBLEM — R06.02 SHORTNESS OF BREATH: Status: RESOLVED | Noted: 2024-09-02 | Resolved: 2024-09-11

## 2024-09-11 PROBLEM — I50.33 ACUTE ON CHRONIC DIASTOLIC CONGESTIVE HEART FAILURE (HCC): Status: RESOLVED | Noted: 2024-09-03 | Resolved: 2024-09-11

## 2024-09-11 PROBLEM — D50.9 IRON DEFICIENCY ANEMIA, UNSPECIFIED: Status: RESOLVED | Noted: 2024-02-06 | Resolved: 2024-09-11

## 2024-09-12 ENCOUNTER — OFFICE VISIT (OUTPATIENT)
Dept: CARDIOLOGY CLINIC | Age: 69
End: 2024-09-12
Payer: MEDICARE

## 2024-09-12 VITALS
HEIGHT: 73 IN | HEART RATE: 58 BPM | WEIGHT: 156.2 LBS | DIASTOLIC BLOOD PRESSURE: 65 MMHG | RESPIRATION RATE: 17 BRPM | BODY MASS INDEX: 20.7 KG/M2 | SYSTOLIC BLOOD PRESSURE: 106 MMHG

## 2024-09-12 DIAGNOSIS — I48.19 PERSISTENT ATRIAL FIBRILLATION (HCC): Primary | ICD-10-CM

## 2024-09-12 DIAGNOSIS — I50.22 HEART FAILURE WITH MID-RANGE EJECTION FRACTION (HFMEF) (HCC): ICD-10-CM

## 2024-09-12 DIAGNOSIS — I25.10 ATHEROSCLEROSIS OF NATIVE CORONARY ARTERY OF NATIVE HEART WITHOUT ANGINA PECTORIS: Chronic | ICD-10-CM

## 2024-09-12 PROCEDURE — 93000 ELECTROCARDIOGRAM COMPLETE: CPT | Performed by: INTERNAL MEDICINE

## 2024-09-12 PROCEDURE — 3074F SYST BP LT 130 MM HG: CPT | Performed by: INTERNAL MEDICINE

## 2024-09-12 PROCEDURE — 1123F ACP DISCUSS/DSCN MKR DOCD: CPT | Performed by: INTERNAL MEDICINE

## 2024-09-12 PROCEDURE — 3078F DIAST BP <80 MM HG: CPT | Performed by: INTERNAL MEDICINE

## 2024-09-12 PROCEDURE — 99214 OFFICE O/P EST MOD 30 MIN: CPT | Performed by: INTERNAL MEDICINE

## 2024-09-17 LAB
ALBUMIN SERPL-MCNC: 4.1 G/DL (ref 3.5–5.2)
ALP SERPL-CCNC: 88 U/L (ref 40–129)
ALT SERPL-CCNC: 10 U/L (ref 0–40)
ANION GAP SERPL CALCULATED.3IONS-SCNC: 12 MMOL/L (ref 7–16)
AST SERPL-CCNC: 17 U/L (ref 0–39)
BILIRUB SERPL-MCNC: 0.5 MG/DL (ref 0–1.2)
BUN SERPL-MCNC: 27 MG/DL (ref 6–23)
CALCIUM SERPL-MCNC: 9 MG/DL (ref 8.6–10.2)
CHLORIDE SERPL-SCNC: 107 MMOL/L (ref 98–107)
CO2 SERPL-SCNC: 22 MMOL/L (ref 22–29)
CREAT SERPL-MCNC: 1 MG/DL (ref 0.7–1.2)
ERYTHROCYTE [DISTWIDTH] IN BLOOD BY AUTOMATED COUNT: 13.5 % (ref 11.5–15)
FERRITIN SERPL-MCNC: 221 NG/ML
GFR, ESTIMATED: 77 ML/MIN/1.73M2
GLUCOSE SERPL-MCNC: 150 MG/DL (ref 74–99)
HCT VFR BLD AUTO: 38.2 % (ref 37–54)
HGB BLD-MCNC: 11.9 G/DL (ref 12.5–16.5)
IRON SATN MFR SERPL: 25 % (ref 20–55)
IRON SERPL-MCNC: 61 UG/DL (ref 59–158)
MCH RBC QN AUTO: 27 PG (ref 26–35)
MCHC RBC AUTO-ENTMCNC: 31.2 G/DL (ref 32–34.5)
MCV RBC AUTO: 86.6 FL (ref 80–99.9)
PLATELET # BLD AUTO: 135 K/UL (ref 130–450)
PMV BLD AUTO: 10.5 FL (ref 7–12)
POTASSIUM SERPL-SCNC: 4.2 MMOL/L (ref 3.5–5)
PROT SERPL-MCNC: 7 G/DL (ref 6.4–8.3)
PSA SERPL-MCNC: 0.5 NG/ML (ref 0–4)
RBC # BLD AUTO: 4.41 M/UL (ref 3.8–5.8)
SODIUM SERPL-SCNC: 141 MMOL/L (ref 132–146)
TIBC SERPL-MCNC: 243 UG/DL (ref 250–450)
WBC OTHER # BLD: 5 K/UL (ref 4.5–11.5)

## 2024-09-27 LAB — HGB BLD-MCNC: 11.6 G/DL (ref 12.5–16.5)

## 2024-10-07 LAB
ALBUMIN SERPL-MCNC: 4.2 G/DL (ref 3.5–5.2)
ALP SERPL-CCNC: 74 U/L (ref 40–129)
ALT SERPL-CCNC: 10 U/L (ref 0–40)
ANION GAP SERPL CALCULATED.3IONS-SCNC: 10 MMOL/L (ref 7–16)
AST SERPL-CCNC: 15 U/L (ref 0–39)
BASOPHILS # BLD: 0.04 K/UL (ref 0–0.2)
BASOPHILS NFR BLD: 1 % (ref 0–2)
BILIRUB SERPL-MCNC: 0.4 MG/DL (ref 0–1.2)
BUN SERPL-MCNC: 20 MG/DL (ref 6–23)
CALCIUM SERPL-MCNC: 9.1 MG/DL (ref 8.6–10.2)
CHLORIDE SERPL-SCNC: 108 MMOL/L (ref 98–107)
CO2 SERPL-SCNC: 24 MMOL/L (ref 22–29)
CREAT SERPL-MCNC: 1.1 MG/DL (ref 0.7–1.2)
EOSINOPHIL # BLD: 0.11 K/UL (ref 0.05–0.5)
EOSINOPHILS RELATIVE PERCENT: 3 % (ref 0–6)
ERYTHROCYTE [DISTWIDTH] IN BLOOD BY AUTOMATED COUNT: 14.6 % (ref 11.5–15)
GFR, ESTIMATED: 73 ML/MIN/1.73M2
GLUCOSE SERPL-MCNC: 146 MG/DL (ref 74–99)
HBA1C MFR BLD: 6.5 % (ref 4–5.6)
HCT VFR BLD AUTO: 35.9 % (ref 37–54)
HGB BLD-MCNC: 11.3 G/DL (ref 12.5–16.5)
IMM GRANULOCYTES # BLD AUTO: <0.03 K/UL (ref 0–0.58)
IMM GRANULOCYTES NFR BLD: 0 % (ref 0–5)
IRON SATN MFR SERPL: 27 % (ref 20–55)
IRON SERPL-MCNC: 60 UG/DL (ref 59–158)
LYMPHOCYTES NFR BLD: 0.99 K/UL (ref 1.5–4)
LYMPHOCYTES RELATIVE PERCENT: 23 % (ref 20–42)
MCH RBC QN AUTO: 27.2 PG (ref 26–35)
MCHC RBC AUTO-ENTMCNC: 31.5 G/DL (ref 32–34.5)
MCV RBC AUTO: 86.5 FL (ref 80–99.9)
MONOCYTES NFR BLD: 0.35 K/UL (ref 0.1–0.95)
MONOCYTES NFR BLD: 8 % (ref 2–12)
NEUTROPHILS NFR BLD: 65 % (ref 43–80)
NEUTS SEG NFR BLD: 2.83 K/UL (ref 1.8–7.3)
PLATELET # BLD AUTO: 99 K/UL (ref 130–450)
PLATELET CONFIRMATION: NORMAL
PMV BLD AUTO: 10.2 FL (ref 7–12)
POTASSIUM SERPL-SCNC: 3.8 MMOL/L (ref 3.5–5)
PROT SERPL-MCNC: 6.8 G/DL (ref 6.4–8.3)
RBC # BLD AUTO: 4.15 M/UL (ref 3.8–5.8)
SODIUM SERPL-SCNC: 142 MMOL/L (ref 132–146)
TIBC SERPL-MCNC: 220 UG/DL (ref 250–450)
WBC OTHER # BLD: 4.3 K/UL (ref 4.5–11.5)

## 2024-10-29 RX ORDER — EMPAGLIFLOZIN 10 MG/1
10 TABLET, FILM COATED ORAL DAILY
Qty: 90 TABLET | Refills: 1 | Status: SHIPPED | OUTPATIENT
Start: 2024-10-29

## 2024-11-25 LAB
ALBUMIN SERPL-MCNC: 4.2 G/DL (ref 3.5–5.2)
ALP SERPL-CCNC: 69 U/L (ref 40–129)
ALT SERPL-CCNC: 10 U/L (ref 0–40)
ANION GAP SERPL CALCULATED.3IONS-SCNC: 11 MMOL/L (ref 7–16)
AST SERPL-CCNC: 16 U/L (ref 0–39)
BILIRUB SERPL-MCNC: 0.5 MG/DL (ref 0–1.2)
BUN SERPL-MCNC: 19 MG/DL (ref 6–23)
CALCIUM SERPL-MCNC: 9 MG/DL (ref 8.6–10.2)
CHLORIDE SERPL-SCNC: 103 MMOL/L (ref 98–107)
CHOLEST SERPL-MCNC: 149 MG/DL
CO2 SERPL-SCNC: 27 MMOL/L (ref 22–29)
CREAT SERPL-MCNC: 1.2 MG/DL (ref 0.7–1.2)
GFR, ESTIMATED: 65 ML/MIN/1.73M2
GLUCOSE SERPL-MCNC: 125 MG/DL (ref 74–99)
HDLC SERPL-MCNC: 50 MG/DL
LDLC SERPL CALC-MCNC: 76 MG/DL
POTASSIUM SERPL-SCNC: 3.8 MMOL/L (ref 3.5–5)
PROT SERPL-MCNC: 6.6 G/DL (ref 6.4–8.3)
PSA SERPL-MCNC: 0.72 NG/ML (ref 0–4)
SODIUM SERPL-SCNC: 141 MMOL/L (ref 132–146)
T4 FREE SERPL-MCNC: 1.1 NG/DL (ref 0.9–1.7)
TRIGL SERPL-MCNC: 114 MG/DL
TSH SERPL DL<=0.05 MIU/L-ACNC: 1.24 UIU/ML (ref 0.27–4.2)
VLDLC SERPL CALC-MCNC: 23 MG/DL

## 2024-11-26 LAB
SHBG SERPL-SCNC: 31 NMOL/L (ref 19–76)
TESTOST FREE MFR SERPL: 94.2 PG/ML (ref 47–244)
TESTOST SERPL-MCNC: 441 NG/DL (ref 193–740)
TESTOSTERONE, BIOAVAILABLE: 220.8 NG/DL (ref 130–680)

## 2024-11-29 RX ORDER — SACUBITRIL AND VALSARTAN 49; 51 MG/1; MG/1
1 TABLET, FILM COATED ORAL 2 TIMES DAILY
Qty: 60 TABLET | Refills: 5 | Status: SHIPPED | OUTPATIENT
Start: 2024-11-29

## 2024-12-27 LAB — 25(OH)D3 SERPL-MCNC: 40.3 NG/ML (ref 30–100)

## 2025-01-06 RX ORDER — TORSEMIDE 10 MG/1
10 TABLET ORAL DAILY
Qty: 30 TABLET | Refills: 5 | Status: SHIPPED | OUTPATIENT
Start: 2025-01-06

## 2025-01-07 PROCEDURE — 99285 EMERGENCY DEPT VISIT HI MDM: CPT

## 2025-01-07 PROCEDURE — 93005 ELECTROCARDIOGRAM TRACING: CPT | Performed by: PHYSICIAN ASSISTANT

## 2025-01-07 ASSESSMENT — PAIN - FUNCTIONAL ASSESSMENT: PAIN_FUNCTIONAL_ASSESSMENT: 0-10

## 2025-01-07 ASSESSMENT — PAIN SCALES - GENERAL: PAINLEVEL_OUTOF10: 0

## 2025-01-07 ASSESSMENT — LIFESTYLE VARIABLES
HOW OFTEN DO YOU HAVE A DRINK CONTAINING ALCOHOL: MONTHLY OR LESS
HOW MANY STANDARD DRINKS CONTAINING ALCOHOL DO YOU HAVE ON A TYPICAL DAY: 1 OR 2

## 2025-01-08 ENCOUNTER — APPOINTMENT (OUTPATIENT)
Dept: GENERAL RADIOLOGY | Age: 70
End: 2025-01-08
Payer: MEDICARE

## 2025-01-08 ENCOUNTER — HOSPITAL ENCOUNTER (EMERGENCY)
Age: 70
Discharge: HOME | End: 2025-01-08
Attending: STUDENT IN AN ORGANIZED HEALTH CARE EDUCATION/TRAINING PROGRAM
Payer: MEDICARE

## 2025-01-08 VITALS
RESPIRATION RATE: 16 BRPM | BODY MASS INDEX: 22 KG/M2 | TEMPERATURE: 97.8 F | HEART RATE: 74 BPM | SYSTOLIC BLOOD PRESSURE: 159 MMHG | DIASTOLIC BLOOD PRESSURE: 76 MMHG | OXYGEN SATURATION: 100 % | WEIGHT: 166 LBS | HEIGHT: 73 IN

## 2025-01-08 DIAGNOSIS — I10 ASYMPTOMATIC HYPERTENSION: Primary | ICD-10-CM

## 2025-01-08 LAB
ALBUMIN SERPL-MCNC: 4.3 G/DL (ref 3.5–5.2)
ALP SERPL-CCNC: 90 U/L (ref 40–129)
ALT SERPL-CCNC: 10 U/L (ref 0–40)
ANION GAP SERPL CALCULATED.3IONS-SCNC: 10 MMOL/L (ref 7–16)
AST SERPL-CCNC: 18 U/L (ref 0–39)
BASOPHILS # BLD: 0.06 K/UL (ref 0–0.2)
BASOPHILS NFR BLD: 1 % (ref 0–2)
BILIRUB DIRECT SERPL-MCNC: <0.2 MG/DL (ref 0–0.3)
BILIRUB INDIRECT SERPL-MCNC: NORMAL MG/DL (ref 0–1)
BILIRUB SERPL-MCNC: 0.4 MG/DL (ref 0–1.2)
BUN SERPL-MCNC: 26 MG/DL (ref 6–23)
CALCIUM SERPL-MCNC: 9.3 MG/DL (ref 8.6–10.2)
CHLORIDE SERPL-SCNC: 104 MMOL/L (ref 98–107)
CO2 SERPL-SCNC: 26 MMOL/L (ref 22–29)
CREAT SERPL-MCNC: 1.1 MG/DL (ref 0.7–1.2)
EOSINOPHIL # BLD: 0.28 K/UL (ref 0.05–0.5)
EOSINOPHILS RELATIVE PERCENT: 6 % (ref 0–6)
ERYTHROCYTE [DISTWIDTH] IN BLOOD BY AUTOMATED COUNT: 13.3 % (ref 11.5–15)
GFR, ESTIMATED: 69 ML/MIN/1.73M2
GLUCOSE SERPL-MCNC: 137 MG/DL (ref 74–99)
HCT VFR BLD AUTO: 32.3 % (ref 37–54)
HGB BLD-MCNC: 11.1 G/DL (ref 12.5–16.5)
IMM GRANULOCYTES # BLD AUTO: <0.03 K/UL (ref 0–0.58)
IMM GRANULOCYTES NFR BLD: 0 % (ref 0–5)
LYMPHOCYTES NFR BLD: 1.11 K/UL (ref 1.5–4)
LYMPHOCYTES RELATIVE PERCENT: 24 % (ref 20–42)
MCH RBC QN AUTO: 30.2 PG (ref 26–35)
MCHC RBC AUTO-ENTMCNC: 34.4 G/DL (ref 32–34.5)
MCV RBC AUTO: 87.8 FL (ref 80–99.9)
MONOCYTES NFR BLD: 0.33 K/UL (ref 0.1–0.95)
MONOCYTES NFR BLD: 7 % (ref 2–12)
NEUTROPHILS NFR BLD: 61 % (ref 43–80)
NEUTS SEG NFR BLD: 2.85 K/UL (ref 1.8–7.3)
PLATELET # BLD AUTO: 110 K/UL (ref 130–450)
PMV BLD AUTO: 9.6 FL (ref 7–12)
POTASSIUM SERPL-SCNC: 4.3 MMOL/L (ref 3.5–5)
PROT SERPL-MCNC: 7.2 G/DL (ref 6.4–8.3)
RBC # BLD AUTO: 3.68 M/UL (ref 3.8–5.8)
SODIUM SERPL-SCNC: 140 MMOL/L (ref 132–146)
TROPONIN I SERPL HS-MCNC: 26 NG/L (ref 0–11)
TROPONIN I SERPL HS-MCNC: 34 NG/L (ref 0–11)
WBC OTHER # BLD: 4.7 K/UL (ref 4.5–11.5)

## 2025-01-08 PROCEDURE — 82248 BILIRUBIN DIRECT: CPT

## 2025-01-08 PROCEDURE — 85025 COMPLETE CBC W/AUTO DIFF WBC: CPT

## 2025-01-08 PROCEDURE — 84484 ASSAY OF TROPONIN QUANT: CPT

## 2025-01-08 PROCEDURE — 80053 COMPREHEN METABOLIC PANEL: CPT

## 2025-01-08 PROCEDURE — 71046 X-RAY EXAM CHEST 2 VIEWS: CPT

## 2025-01-08 NOTE — DISCHARGE INSTRUCTIONS
You were seen in the emergency department today for your high blood pressure.  Your EKG did not show signs of heart attack, your troponins did not show signs of heart attack, your kidney function was within his baseline, you had no signs of stroke, or heart failure on my assessment, or on your chest x-ray results.  It is likely you are experiencing something called asymptomatic hypertension which is high blood pressure without symptoms.  I would recommend following up with your nephrologist for further blood pressure control.  If you do start experiencing issues with chest pain, shortness of breath, strokelike symptoms, please return to the emergency department

## 2025-01-08 NOTE — ED PROVIDER NOTES
All other components within normal limits   TROPONIN - Abnormal; Notable for the following components:    Troponin, High Sensitivity 26 (*)     All other components within normal limits   HEPATIC FUNCTION PANEL       As interpreted by me, the above displayed labs are abnormal. All other labs obtained during this visit were within normal range or not returned as of this dictation.    RADIOLOGY:   Non-plain film images such as CT, Ultrasound and MRI are read by the radiologist. Plain radiographic images are visualized and preliminarily interpreted by the ED Provider with the findings documented in the ED Course.    Interpretation per the Radiologist below, if available at the time of this note:    XR CHEST (2 VW)   Final Result   No acute cardiopulmonary disease.           No results found.    No results found.    PROCEDURES   Unless otherwise noted below, none       CRITICAL CARE TIME (.cct)   None    PAST MEDICAL HISTORY/Chronic Conditions Affecting Care      has a past medical history of SHIVAM (cerebral atherosclerosis), Coronary atherosclerosis of native coronary artery, COVID, Diabetes mellitus (HCC), Dizziness, History of tobacco use (5/19/2022), Hyperlipidemia, Hypertension, Left carotid stenosis (5/19/2022), and Ulcerative colitis (HCC).     EMERGENCY DEPARTMENT COURSE    Vitals:    Vitals:    01/07/25 2205 01/07/25 2209 01/08/25 0347 01/08/25 0531   BP: (!) 174/70  129/69 (!) 159/76   Pulse: 81  68 74   Resp: 16  18 16   Temp:  97.7 °F (36.5 °C) 97.8 °F (36.6 °C)    TempSrc: Oral Oral     SpO2: 100%  97% 100%   Weight: 75.3 kg (166 lb)      Height: 1.854 m (6' 1\")          Patient was given the following medications:  Medications - No data to display      Is this patient to be included in the SEP-1 Core Measure due to severe sepsis or septic shock?      No          Medical Decision Making/Differential Diagnosis:    CC/HPI Summary, Social Determinants of health, Records Reviewed, DDx, testing done/not done,

## 2025-01-08 NOTE — DISCHARGE INSTR - COC
Yes amt example:[id number]}  Last Modified Barium Swallow with Video (Video Swallowing Test): {Done Not Done Date:}    Treatments at the Time of Hospital Discharge:   Respiratory Treatments: ***  Oxygen Therapy:  {Therapy; copd oxygen:12853}  Ventilator:    { CC Vent List:677846076}    Rehab Therapies: {THERAPEUTIC INTERVENTION:5091674112}  Weight Bearing Status/Restrictions: {[facility] Weight Bearin}  Other Medical Equipment (for information only, NOT a DME order):  {EQUIPMENT:077575615}  Other Treatments: ***    Patient's personal belongings (please select all that are sent with patient):  {[facility] DME Belongings:046849253}    RN SIGNATURE:  {Esignature:772031216}    CASE MANAGEMENT/SOCIAL WORK SECTION    Inpatient Status Date: ***    Readmission Risk Assessment Score:  [facility] RISK OF UNPLANNED READMISSION 2.0             0 Total Score        Discharging to Facility/ Agency   Name:   Address:  Phone:  Fax:    Dialysis Facility (if applicable)   Name:  Address:  Dialysis Schedule:  Phone:  Fax:    / signature: {Esignature:904277374}    PHYSICIAN SECTION    Prognosis: {Prognosis:4369974162}    Condition at Discharge: { Patient Condition:044220231}    Rehab Potential (if transferring to Rehab): {Prognosis:0781879602}    Recommended Labs or Other Treatments After Discharge: ***    Physician Certification: I certify the above information and transfer of Guero Chacko  is necessary for the continuing treatment of the diagnosis listed and that he requires {Admit to Appropriate Level of Care:02502} for {GREATER/LESS:175697589} 30 days.     Update Admission H&P: {[facility] DME Changes in HandP:154945916}    PHYSICIAN SIGNATURE:  {Esignature:340659709}

## 2025-01-08 NOTE — ED NOTES
Department of Emergency Medicine  FIRST PROVIDER TRIAGE NOTE             Independent MLP           1/7/25  10:51 PM EST    Date of Encounter: 1/7/25   MRN: [id number]      HPI: Guero Chacko is a 69 y.o. male who presents to the ED for Hypertension (Pt reports issues with having HTN, started spironalactalone by Nephrologist Dr. Paget. Follows with cardiologist Dr. Skinner. )     Pt presents to ED with htn at home 214/78. He takes medications for bp and after these readings it was time for and he took his night bp medications and came to ED. He denies chest pain or shortness of breath, he reports a few hours earlier he had trouble urinating but this resolved. He recently started spironolactone from nephrologist. At triage the patient HR was 29-32 and we watched it for some time. After a minute we rechecked it and it was 79.  Pt denies history of having very slow heart rate.     Vitals:    01/07/25 2209   BP:    Pulse:    Resp:    Temp: 97.7 °F (36.5 °C)   SpO2:          ROS: Negative for cp, sob, abd pain, back pain, fever, cough, vomiting, or diarrhea.    PE: Gen Appearance/Constitutional: alert  CV: bradicardia  Pulm: CTA bilat     Initial Plan of Care:  Plan to order/Initiate the following while awaiting opening in ED: labs, EKG, and imaging studies.   Instructed patient and/or family member with patient if any worsening condition to notify staff.    Provider-Patient relationship only established for Provider In Triage (PIT) secondary to high volume, low staffing, and/or boarding- patient to await bed availability..  Full assessment, HPI and examination not performed.  Discussed with patient that the provider in triage evaluation is not a comprehensive medical screening exam and this is not yet possible at the end of the provider in triage encounter, to state whether or not an emergency medical condition exist  This ends my PIT-Patient relationship.    Electronically signed by Ramona Dos Santos PA-C   DD: 1/7/25

## 2025-01-09 LAB
EKG ATRIAL RATE: 76 BPM
EKG P AXIS: 74 DEGREES
EKG P-R INTERVAL: 158 MS
EKG Q-T INTERVAL: 396 MS
EKG QRS DURATION: 102 MS
EKG QTC CALCULATION (BAZETT): 445 MS
EKG R AXIS: 61 DEGREES
EKG T AXIS: 47 DEGREES
EKG VENTRICULAR RATE: 76 BPM

## 2025-01-09 PROCEDURE — 93010 ELECTROCARDIOGRAM REPORT: CPT | Performed by: INTERNAL MEDICINE

## 2025-03-31 ENCOUNTER — OFFICE VISIT (OUTPATIENT)
Dept: CARDIOLOGY CLINIC | Age: 70
End: 2025-03-31
Payer: MEDICARE

## 2025-03-31 VITALS
RESPIRATION RATE: 18 BRPM | OXYGEN SATURATION: 95 % | TEMPERATURE: 96.9 F | DIASTOLIC BLOOD PRESSURE: 58 MMHG | WEIGHT: 167.4 LBS | SYSTOLIC BLOOD PRESSURE: 124 MMHG | HEART RATE: 69 BPM | BODY MASS INDEX: 22.67 KG/M2 | HEIGHT: 72 IN

## 2025-03-31 DIAGNOSIS — I25.10 ATHEROSCLEROSIS OF NATIVE CORONARY ARTERY OF NATIVE HEART WITHOUT ANGINA PECTORIS: Primary | Chronic | ICD-10-CM

## 2025-03-31 DIAGNOSIS — I48.19 PERSISTENT ATRIAL FIBRILLATION (HCC): ICD-10-CM

## 2025-03-31 DIAGNOSIS — I50.22 HEART FAILURE WITH MID-RANGE EJECTION FRACTION (HFMEF) (HCC): ICD-10-CM

## 2025-03-31 PROCEDURE — 3074F SYST BP LT 130 MM HG: CPT | Performed by: INTERNAL MEDICINE

## 2025-03-31 PROCEDURE — G2211 COMPLEX E/M VISIT ADD ON: HCPCS | Performed by: INTERNAL MEDICINE

## 2025-03-31 PROCEDURE — 99214 OFFICE O/P EST MOD 30 MIN: CPT | Performed by: INTERNAL MEDICINE

## 2025-03-31 PROCEDURE — 1160F RVW MEDS BY RX/DR IN RCRD: CPT | Performed by: INTERNAL MEDICINE

## 2025-03-31 PROCEDURE — 1159F MED LIST DOCD IN RCRD: CPT | Performed by: INTERNAL MEDICINE

## 2025-03-31 PROCEDURE — 3078F DIAST BP <80 MM HG: CPT | Performed by: INTERNAL MEDICINE

## 2025-03-31 PROCEDURE — 1123F ACP DISCUSS/DSCN MKR DOCD: CPT | Performed by: INTERNAL MEDICINE

## 2025-03-31 PROCEDURE — 93000 ELECTROCARDIOGRAM COMPLETE: CPT | Performed by: INTERNAL MEDICINE

## 2025-03-31 RX ORDER — SPIRONOLACTONE 25 MG/1
25 TABLET ORAL
COMMUNITY
Start: 2025-01-03 | End: 2026-01-03

## 2025-03-31 NOTE — PROGRESS NOTES
Chief Complaint   Patient presents with    Atrial Fibrillation    Congestive Heart Failure       Patient Active Problem List    Diagnosis Date Noted    Heart failure with mid-range ejection fraction (HFmEF) (HCC) 09/12/2024    Pleural effusion, bilateral 09/02/2024    Iron deficiency anemia 09/02/2024    H/O carotid endarterectomy 04/17/2023    History of tobacco use 05/19/2022    Persistent atrial fibrillation (HCC) 07/16/2020     Overview Note:     A. CHADS-VASC=4  B. [facility] 10/2023: WATCHMAN FLX Pro device (31mm)  C. TAWNYA guided DCCV 9/4/2024          Coronary atherosclerosis of native coronary artery 03/07/2012     Overview Note:       Cardiac catheterization, 4/15/05.  Moderate coronary atherosclerosis in RCA, circumflex and LAD arteries.  Normal LV systolic function.  LVEF 65%.   Exercise Myoview 4/21/08.  13 METS, no chest pain, normal ECG, normal scan.       Hypertension     Hyperlipidemia      Overview Note:       Possible LFT abnormality with Lipitor. Myalgias with crestor      Diabetes mellitus ([facility])     Adrenal adenoma 07/06/2011    Testicular hypofunction 07/06/2011    Ulcerative colitis ([facility]) 06/17/2011     Overview Note:     Last Assessment & Plan:   Continue with medical management  Appreciate ongoing GI input  No surgical indication at this time         Current Outpatient Medications   Medication Sig Dispense Refill    spironolactone (ALDACTONE) 25 MG tablet Take 1 tablet by mouth      torsemide (DEMADEX) 10 MG tablet Take 1 tablet by mouth daily 30 tablet 5    ENTRESTO 49-51 MG per tablet TAKE 1 TABLET BY MOUTH TWICE A DAY 60 tablet 5    JARDIANCE 10 MG tablet TAKE 1 TABLET BY MOUTH EVERY DAY 90 tablet 1    hydrocortisone (CORTENEMA) 100 MG/60ML enema Place 1 enema rectally nightly      finasteride (PROSCAR) 5 MG tablet Take 1 tablet by mouth daily      aspirin 81 MG EC tablet Take 1 tablet by mouth daily 30 tablet 3    Cholecalciferol (VITAMIN D3) 50 MCG (2000 UT) TABS Take 1-3 tablets by mouth daily

## 2025-04-02 LAB
ALBUMIN SERPL-MCNC: 4.6 G/DL (ref 3.5–5.2)
ALP SERPL-CCNC: 98 U/L (ref 40–129)
ALT SERPL-CCNC: 10 U/L (ref 0–40)
ANION GAP SERPL CALCULATED.3IONS-SCNC: 14 MMOL/L (ref 7–16)
AST SERPL-CCNC: 15 U/L (ref 0–39)
BILIRUB SERPL-MCNC: 0.5 MG/DL (ref 0–1.2)
BUN SERPL-MCNC: 44 MG/DL (ref 6–23)
CALCIUM SERPL-MCNC: 9.2 MG/DL (ref 8.6–10.2)
CHLORIDE SERPL-SCNC: 107 MMOL/L (ref 98–107)
CHOLEST SERPL-MCNC: 142 MG/DL
CO2 SERPL-SCNC: 20 MMOL/L (ref 22–29)
CREAT SERPL-MCNC: 1.6 MG/DL (ref 0.7–1.2)
ERYTHROCYTE [DISTWIDTH] IN BLOOD BY AUTOMATED COUNT: 13.2 % (ref 11.5–15)
GFR, ESTIMATED: 46 ML/MIN/1.73M2
GLUCOSE SERPL-MCNC: 147 MG/DL (ref 74–99)
HCT VFR BLD AUTO: 33.3 % (ref 37–54)
HDLC SERPL-MCNC: 51 MG/DL
HGB BLD-MCNC: 11 G/DL (ref 12.5–16.5)
LDLC SERPL CALC-MCNC: 75 MG/DL
MAGNESIUM SERPL-MCNC: 2.9 MG/DL (ref 1.6–2.6)
MCH RBC QN AUTO: 30.4 PG (ref 26–35)
MCHC RBC AUTO-ENTMCNC: 33 G/DL (ref 32–34.5)
MCV RBC AUTO: 92 FL (ref 80–99.9)
PLATELET # BLD AUTO: 93 K/UL (ref 130–450)
PMV BLD AUTO: 10.4 FL (ref 7–12)
POTASSIUM SERPL-SCNC: 5.3 MMOL/L (ref 3.5–5)
PROT SERPL-MCNC: 7.2 G/DL (ref 6.4–8.3)
PSA SERPL-MCNC: 0.72 NG/ML (ref 0–4)
RBC # BLD AUTO: 3.62 M/UL (ref 3.8–5.8)
SODIUM SERPL-SCNC: 141 MMOL/L (ref 132–146)
TRIGL SERPL-MCNC: 81 MG/DL
VLDLC SERPL CALC-MCNC: 16 MG/DL
WBC OTHER # BLD: 4.7 K/UL (ref 4.5–11.5)

## 2025-05-01 LAB
BUN SERPL-MCNC: 21 MG/DL (ref 8–23)
CK SERPL-CCNC: 83 U/L (ref 0–190)
MAGNESIUM SERPL-MCNC: 2.2 MG/DL (ref 1.6–2.4)

## 2025-05-05 LAB — CK SERPL-CCNC: NORMAL U/L (ref 39–308)

## 2025-05-15 DIAGNOSIS — Z98.890 H/O CAROTID ENDARTERECTOMY: Primary | ICD-10-CM

## 2025-05-15 DIAGNOSIS — I65.23 BILATERAL CAROTID ARTERY STENOSIS: ICD-10-CM

## 2025-05-20 RX ORDER — SACUBITRIL AND VALSARTAN 49; 51 MG/1; MG/1
1 TABLET, FILM COATED ORAL 2 TIMES DAILY
Qty: 60 TABLET | Refills: 1 | Status: SHIPPED | OUTPATIENT
Start: 2025-05-20

## 2025-05-29 LAB
ALBUMIN SERPL-MCNC: 4.4 G/DL (ref 3.5–5.2)
ALP SERPL-CCNC: 93 U/L (ref 40–129)
ALT SERPL-CCNC: 10 U/L (ref 0–50)
ANION GAP SERPL CALCULATED.3IONS-SCNC: 10 MMOL/L (ref 7–16)
ANION GAP SERPL CALCULATED.3IONS-SCNC: 9 MMOL/L (ref 7–16)
AST SERPL-CCNC: 19 U/L (ref 0–50)
BILIRUB SERPL-MCNC: 0.7 MG/DL (ref 0–1.2)
BUN SERPL-MCNC: 19 MG/DL (ref 8–23)
CALCIUM SERPL-MCNC: 9.4 MG/DL (ref 8.8–10.2)
CALCIUM SERPL-MCNC: 9.8 MG/DL (ref 8.8–10.2)
CHLORIDE SERPL-SCNC: 107 MMOL/L (ref 98–107)
CHLORIDE SERPL-SCNC: 107 MMOL/L (ref 98–107)
CHOLEST SERPL-MCNC: 132 MG/DL
CO2 SERPL-SCNC: 26 MMOL/L (ref 22–29)
CO2 SERPL-SCNC: 26 MMOL/L (ref 22–29)
CREAT SERPL-MCNC: 1.4 MG/DL (ref 0.7–1.2)
CREAT UR-MCNC: 107 MG/DL (ref 40–278)
ERYTHROCYTE [DISTWIDTH] IN BLOOD BY AUTOMATED COUNT: 12.4 % (ref 11.5–15)
FSH SERPL-ACNC: 21.1 MIU/ML
GFR, ESTIMATED: 55 ML/MIN/1.73M2
GLUCOSE SERPL-MCNC: 150 MG/DL (ref 74–99)
HBA1C MFR BLD: 5.2 % (ref 4–5.6)
HCT VFR BLD AUTO: 34.1 % (ref 37–54)
HCT VFR BLD AUTO: 34.6 % (ref 37–54)
HDLC SERPL-MCNC: 55 MG/DL
HGB BLD-MCNC: 11.3 G/DL (ref 12.5–16.5)
LDLC SERPL CALC-MCNC: 57 MG/DL
LH SERPL-ACNC: 12.8 MIU/ML
MAGNESIUM SERPL-MCNC: 2.4 MG/DL (ref 1.6–2.4)
MCH RBC QN AUTO: 30.2 PG (ref 26–35)
MCHC RBC AUTO-ENTMCNC: 33.1 G/DL (ref 32–34.5)
MCV RBC AUTO: 91.2 FL (ref 80–99.9)
MICROALBUMIN UR-MCNC: <12 MG/L (ref 0–20)
MICROALBUMIN/CREAT UR-RTO: <11 MCG/MG CREAT (ref 0–30)
PLATELET # BLD AUTO: 84 K/UL (ref 130–450)
PLATELET CONFIRMATION: NORMAL
PMV BLD AUTO: 10 FL (ref 7–12)
POTASSIUM SERPL-SCNC: 4.2 MMOL/L (ref 3.5–5.1)
POTASSIUM SERPL-SCNC: 4.3 MMOL/L (ref 3.5–5.1)
PROLACTIN SERPL-MCNC: 7.33 NG/ML
PROT SERPL-MCNC: 7 G/DL (ref 6.4–8.3)
PSA SERPL-MCNC: 0.81 NG/ML (ref 0–4)
RBC # BLD AUTO: 3.74 M/UL (ref 3.8–5.8)
SODIUM SERPL-SCNC: 142 MMOL/L (ref 136–145)
SODIUM SERPL-SCNC: 142 MMOL/L (ref 136–145)
T4 FREE SERPL-MCNC: 0.9 NG/DL (ref 0.9–1.7)
T4 FREE SERPL-MCNC: 0.9 NG/DL (ref 0.9–1.7)
TRIGL SERPL-MCNC: 100 MG/DL
TSH SERPL DL<=0.05 MIU/L-ACNC: 0.76 UIU/ML (ref 0.27–4.2)
TSH SERPL DL<=0.05 MIU/L-ACNC: 0.77 UIU/ML (ref 0.27–4.2)
VLDLC SERPL CALC-MCNC: 20 MG/DL
WBC OTHER # BLD: 4.5 K/UL (ref 4.5–11.5)

## 2025-05-30 ENCOUNTER — HOSPITAL ENCOUNTER (OUTPATIENT)
Dept: CARDIOLOGY | Age: 70
Discharge: HOME | End: 2025-05-30
Attending: SURGERY
Payer: MEDICARE

## 2025-05-30 DIAGNOSIS — I65.23 BILATERAL CAROTID ARTERY STENOSIS: ICD-10-CM

## 2025-05-30 DIAGNOSIS — Z98.890 H/O CAROTID ENDARTERECTOMY: ICD-10-CM

## 2025-05-30 LAB
SHBG SERPL-SCNC: 22 NMOL/L (ref 19–76)
TESTOST FREE MFR SERPL: 94.8 PG/ML (ref 47–244)
TESTOST SERPL-MCNC: 379 NG/DL (ref 193–740)
TESTOSTERONE, BIOAVAILABLE: 222.1 NG/DL (ref 130–680)
VAS LEFT CCA DIST EDV: 16.8 CM/S
VAS LEFT CCA DIST PSV: 83.5 CM/S
VAS LEFT CCA PROX EDV: 18.3 CM/S
VAS LEFT CCA PROX PSV: 96.7 CM/S
VAS LEFT ECA EDV: 0 CM/S
VAS LEFT ECA PSV: 67.3 CM/S
VAS LEFT ICA DIST EDV: 27.2 CM/S
VAS LEFT ICA DIST PSV: 111.2 CM/S
VAS LEFT ICA MID EDV: 30.8 CM/S
VAS LEFT ICA MID PSV: 114.8 CM/S
VAS LEFT ICA PROX EDV: 27.8 CM/S
VAS LEFT ICA PROX PSV: 71.4 CM/S
VAS LEFT ICA/CCA PSV: 1.2 NO UNITS
VAS LEFT VERTEBRAL EDV: 21.9 CM/S
VAS LEFT VERTEBRAL PSV: 70.9 CM/S
VAS RIGHT CCA DIST EDV: 17.4 CM/S
VAS RIGHT CCA DIST PSV: 56.7 CM/S
VAS RIGHT CCA PROX EDV: 18 CM/S
VAS RIGHT CCA PROX PSV: 90.6 CM/S
VAS RIGHT ECA EDV: 10.9 CM/S
VAS RIGHT ECA PSV: 83.2 CM/S
VAS RIGHT ICA DIST EDV: 40 CM/S
VAS RIGHT ICA DIST PSV: 100.2 CM/S
VAS RIGHT ICA MID EDV: 21.7 CM/S
VAS RIGHT ICA MID PSV: 92.9 CM/S
VAS RIGHT ICA PROX EDV: 37.9 CM/S
VAS RIGHT ICA PROX PSV: 89.3 CM/S
VAS RIGHT ICA/CCA PSV: 1.1 NO UNITS
VAS RIGHT VERTEBRAL EDV: 23 CM/S
VAS RIGHT VERTEBRAL PSV: 63.6 CM/S

## 2025-05-30 PROCEDURE — 93880 EXTRACRANIAL BILAT STUDY: CPT

## 2025-06-03 ENCOUNTER — OFFICE VISIT (OUTPATIENT)
Dept: VASCULAR SURGERY | Age: 70
End: 2025-06-03
Payer: MEDICARE

## 2025-06-03 DIAGNOSIS — Z98.890 H/O CAROTID ENDARTERECTOMY: Primary | ICD-10-CM

## 2025-06-03 DIAGNOSIS — I65.23 BILATERAL CAROTID ARTERY STENOSIS: ICD-10-CM

## 2025-06-03 LAB
VAS LEFT CCA DIST EDV: 16.8 CM/S
VAS LEFT CCA DIST PSV: 83.5 CM/S
VAS LEFT CCA PROX EDV: 18.3 CM/S
VAS LEFT CCA PROX PSV: 96.7 CM/S
VAS LEFT ECA EDV: 0 CM/S
VAS LEFT ECA PSV: 67.3 CM/S
VAS LEFT ICA DIST EDV: 27.2 CM/S
VAS LEFT ICA DIST PSV: 111.2 CM/S
VAS LEFT ICA MID EDV: 30.8 CM/S
VAS LEFT ICA MID PSV: 114.8 CM/S
VAS LEFT ICA PROX EDV: 27.8 CM/S
VAS LEFT ICA PROX PSV: 71.4 CM/S
VAS LEFT ICA/CCA PSV: 1.2 NO UNITS
VAS LEFT VERTEBRAL EDV: 21.9 CM/S
VAS LEFT VERTEBRAL PSV: 70.9 CM/S
VAS RIGHT CCA DIST EDV: 17.4 CM/S
VAS RIGHT CCA DIST PSV: 56.7 CM/S
VAS RIGHT CCA PROX EDV: 18 CM/S
VAS RIGHT CCA PROX PSV: 90.6 CM/S
VAS RIGHT ECA EDV: 10.9 CM/S
VAS RIGHT ECA PSV: 83.2 CM/S
VAS RIGHT ICA DIST EDV: 40 CM/S
VAS RIGHT ICA DIST PSV: 100.2 CM/S
VAS RIGHT ICA MID EDV: 21.7 CM/S
VAS RIGHT ICA MID PSV: 92.9 CM/S
VAS RIGHT ICA PROX EDV: 37.9 CM/S
VAS RIGHT ICA PROX PSV: 89.3 CM/S
VAS RIGHT ICA/CCA PSV: 1.1 NO UNITS
VAS RIGHT VERTEBRAL EDV: 23 CM/S
VAS RIGHT VERTEBRAL PSV: 63.6 CM/S

## 2025-06-03 PROCEDURE — 1123F ACP DISCUSS/DSCN MKR DOCD: CPT

## 2025-06-03 PROCEDURE — 93880 EXTRACRANIAL BILAT STUDY: CPT | Performed by: SURGERY

## 2025-06-03 PROCEDURE — 1159F MED LIST DOCD IN RCRD: CPT

## 2025-06-03 PROCEDURE — 99213 OFFICE O/P EST LOW 20 MIN: CPT

## 2025-06-03 NOTE — PROGRESS NOTES
Vascular Surgery Outpatient Progress Note      Chief Complaint   Patient presents with    Follow-up     SHIVAM       HISTORY OF PRESENT ILLNESS:                The patient is a 69 y.o. male who returns for follow-up evaluation of carotid artery stenosis and history of left carotid endarterectomy in 10/2022.  Overall he has been doing well.  He continues to get flare ups of his colitis.      Past Medical History:        Diagnosis Date    SHIVAM (cerebral atherosclerosis)     Coronary atherosclerosis of native coronary artery     COVID     Diabetes mellitus (HCC)     Dizziness     History of tobacco use 5/19/2022    Hyperlipidemia     Hypertension     Left carotid stenosis 5/19/2022    Ulcerative colitis (HCC)      Past Surgical History:        Procedure Laterality Date    CAROTID ENDARTERECTOMY Left 10/03/2022    CAROTID ENDARTERECTOMY performed by Christian Chavez MD at [facility] OR    COLONOSCOPY      DIAGNOSTIC CARDIAC CATH LAB PROCEDURE      SIGMOIDOSCOPY      SIGMOIDOSCOPY       Current Medications:   Prior to Admission medications    Medication Sig Start Date End Date Taking? Authorizing Provider   ENTRESTO 49-51 MG per tablet TAKE 1 TABLET BY MOUTH TWICE A DAY 5/20/25  Yes Jimmie Skinner MD   spironolactone (ALDACTONE) 25 MG tablet Take 1 tablet by mouth 1/3/25 1/3/26 Yes Karla Jarrett MD   torsemide (DEMADEX) 10 MG tablet Take 1 tablet by mouth daily 1/6/25  Yes Jimmie Skinner MD   JARDIANCE 10 MG tablet TAKE 1 TABLET BY MOUTH EVERY DAY 10/29/24  Yes Jimmie Skinner MD   hydrocortisone (CORTENEMA) 100 MG/60ML enema Place 1 enema rectally nightly   Yes Karla Jarrett MD   finasteride (PROSCAR) 5 MG tablet Take 1 tablet by mouth daily   Yes ProviderKarla MD   aspirin 81 MG EC tablet Take 1 tablet by mouth daily 10/6/22  Yes José Breen DO   Cholecalciferol (VITAMIN D3) 50 MCG (2000 UT) TABS Take 1-3 tablets by mouth daily   Yes Karla Jarrett MD   potassium chloride (KLOR-CON M) 20

## 2025-06-12 RX ORDER — TORSEMIDE 10 MG/1
10 TABLET ORAL DAILY
Qty: 30 TABLET | Refills: 2 | Status: SHIPPED | OUTPATIENT
Start: 2025-06-12

## 2025-07-07 RX ORDER — EMPAGLIFLOZIN 10 MG/1
10 TABLET, FILM COATED ORAL DAILY
Qty: 90 TABLET | Refills: 1 | Status: SHIPPED | OUTPATIENT
Start: 2025-07-07

## 2025-07-15 RX ORDER — SACUBITRIL AND VALSARTAN 49; 51 MG/1; MG/1
1 TABLET, FILM COATED ORAL 2 TIMES DAILY
Qty: 60 TABLET | Refills: 5 | Status: SHIPPED | OUTPATIENT
Start: 2025-07-15

## (undated) DEVICE — CATHETER,URETHRAL,REDRUBBER,STERILE,22FR: Brand: MEDLINE

## (undated) DEVICE — LAPAROTOMY DRAPE WITH POUCHES: Brand: CONVERTORS

## (undated) DEVICE — GLOVE SURG SZ 6 THK91MIL LTX FREE SYN POLYISOPRENE ANTI

## (undated) DEVICE — 3M™ IOBAN™ 2 ANTIMICROBIAL INCISE DRAPE 6640EZ: Brand: IOBAN™ 2

## (undated) DEVICE — NEEDLE HYPO 21GA L1.5IN GRN POLYPR HUB S STL REG BVL STR

## (undated) DEVICE — NEEDLE HYPO 26GA L0.625IN TAN POLYPR HUB S STL REG BVL STR

## (undated) DEVICE — SYSTEM CATH 20GA L1IN OD1.0668-1.143MM ID0.7874-0.8636MM

## (undated) DEVICE — ELECTRODE PT RET AD L9FT HI MOIST COND ADH HYDRGEL CORDED

## (undated) DEVICE — GLOVE SURG SZ 75 CRM LTX FREE POLYISOPRENE POLYMER BEAD ANTI

## (undated) DEVICE — GOWN,AURORA,NONREINF,RAGLAN,L,STERILE: Brand: MEDLINE

## (undated) DEVICE — GARMENT,MEDLINE,DVT,INT,CALF,MED, GEN2: Brand: MEDLINE

## (undated) DEVICE — LOOP VES W25MM THK1MM MAXI RED SIL FLD REPELLENT 100 PER

## (undated) DEVICE — SOLUTION IV 500ML 0.9% SOD CHL PH 5 INJ USP VIAFLX PLAS

## (undated) DEVICE — MINOR VASCULAR: Brand: MEDLINE INDUSTRIES, INC.

## (undated) DEVICE — GLOVE SURG SZ 75 L12IN FNGR THK79MIL GRN LTX FREE

## (undated) DEVICE — GLOVE SURG SIGNATURE LTX ESS LTX PF 7.5